# Patient Record
Sex: FEMALE | Race: WHITE | Employment: FULL TIME | URBAN - METROPOLITAN AREA
[De-identification: names, ages, dates, MRNs, and addresses within clinical notes are randomized per-mention and may not be internally consistent; named-entity substitution may affect disease eponyms.]

---

## 2017-02-14 ENCOUNTER — OFFICE VISIT (OUTPATIENT)
Dept: INTERNAL MEDICINE CLINIC | Age: 27
End: 2017-02-14

## 2017-02-14 VITALS
DIASTOLIC BLOOD PRESSURE: 68 MMHG | OXYGEN SATURATION: 98 % | RESPIRATION RATE: 16 BRPM | TEMPERATURE: 99.1 F | SYSTOLIC BLOOD PRESSURE: 96 MMHG | HEART RATE: 86 BPM | BODY MASS INDEX: 19.53 KG/M2 | HEIGHT: 65 IN | WEIGHT: 117.2 LBS

## 2017-02-14 DIAGNOSIS — F60.3 BORDERLINE PERSONALITY DISORDER (HCC): ICD-10-CM

## 2017-02-14 DIAGNOSIS — E10.9 INSULIN DEPENDENT DIABETES MELLITUS TYPE IA (HCC): Primary | ICD-10-CM

## 2017-02-14 DIAGNOSIS — D64.89 ANEMIA DUE TO OTHER CAUSE: ICD-10-CM

## 2017-02-14 DIAGNOSIS — F41.1 GENERALIZED ANXIETY DISORDER: ICD-10-CM

## 2017-02-14 DIAGNOSIS — F98.8 ADD (ATTENTION DEFICIT DISORDER): ICD-10-CM

## 2017-02-14 DIAGNOSIS — E53.8 B12 DEFICIENCY DUE TO DIET: ICD-10-CM

## 2017-02-14 RX ORDER — INSULIN GLARGINE 100 [IU]/ML
INJECTION, SOLUTION SUBCUTANEOUS
Qty: 3 EACH | Refills: 3 | Status: SHIPPED | OUTPATIENT
Start: 2017-02-14 | End: 2017-08-14 | Stop reason: SDUPTHER

## 2017-02-14 RX ORDER — PEN NEEDLE, DIABETIC 30 GX3/16"
NEEDLE, DISPOSABLE MISCELLANEOUS
Qty: 1 PACKAGE | Refills: 11 | Status: SHIPPED | OUTPATIENT
Start: 2017-02-14 | End: 2017-05-15

## 2017-02-14 RX ORDER — SERTRALINE HYDROCHLORIDE 25 MG/1
25 TABLET, FILM COATED ORAL DAILY
Qty: 60 TAB | Refills: 0 | Status: SHIPPED | OUTPATIENT
Start: 2017-02-14 | End: 2017-04-15

## 2017-02-14 RX ORDER — INSULIN ASPART 100 [IU]/ML
INJECTION, SOLUTION INTRAVENOUS; SUBCUTANEOUS
Qty: 2 ADJUSTABLE DOSE PRE-FILLED PEN SYRINGE | Refills: 2 | Status: SHIPPED | OUTPATIENT
Start: 2017-02-14 | End: 2017-04-28 | Stop reason: SDUPTHER

## 2017-02-14 NOTE — LETTER
2/28/2017 2:42 PM 
 
Ms. Ashley Courtney [address] [facility] [address] Dear Ashley Courtney: 
 
Please find your most recent results below. Resulted Orders AMB POC URINE, MICROALBUMIN, SEMIQUANT (3 RESULTS) Result Value Ref Range ALBUMIN, URINE POC 150mg Negative mg/L  
 CREATININE, URINE POC 300mg mg/dL Microalbumin/creat ratio (POC) 30-300mg mg/g RECOMMENDATIONS: 
labs came back fine Please call me if you have any questions: [phone] Sincerely, Vada Bumpers, MD

## 2017-02-14 NOTE — MR AVS SNAPSHOT
Visit Information Date & Time Provider Department Dept. Phone Encounter #  
 2/14/2017  2:00 PM Devan Brower MD [facility] Internal Medicine [phone] [id number] Upcoming Health Maintenance Date Due  
 FOOT EXAM Q1 4/11/2002 EYE EXAM RETINAL OR DILATED Q1 4/11/2002 HPV AGE 9Y-26Y (1 of 3 - Female 3 Dose Series) 4/11/2003 Pneumococcal 19-64 Medium Risk (1 of 1 - PPSV23) 4/11/2011 DTaP/Tdap/Td series (1 - Tdap) 4/11/2013 HEMOGLOBIN A1C Q6M 8/14/2017 MICROALBUMIN Q1 2/14/2018 LIPID PANEL Q1 2/14/2018 PAP AKA CERVICAL CYTOLOGY 8/11/2018 Allergies as of 2/14/2017  Review Complete On: 2/14/2017 By: Devan Brower MD  
 No Known Allergies Current Immunizations  Never Reviewed No immunizations on file. Not reviewed this visit You Were Diagnosed With   
  
 Codes Comments Insulin dependent diabetes mellitus type IA ([facility]    -  Primary ICD-10-CM: E10.9 ICD-9-CM: 250.01 Borderline personality disorder     ICD-10-CM: F60.3 ICD-9-CM: 623.63 Generalized anxiety disorder     ICD-10-CM: F41.1 ICD-9-CM: 300.02   
 ADD (attention deficit disorder)     ICD-10-CM: F98.8 ICD-9-CM: 314.00 Anemia due to other cause     ICD-10-CM: D64.89   
 B12 deficiency due to diet     ICD-10-CM: E53.8 ICD-9-CM: 266.2 Vitals BP Pulse Temp Resp Height(growth percentile) Weight(growth percentile) 96/68 (BP 1 Location: Right arm, BP Patient Position: Sitting) 86 99.1 °F (37.3 °C) (Oral) 16 5' 5\" (1.651 m) 117 lb 3.2 oz (53.2 kg) LMP SpO2 BMI OB Status Smoking Status 02/06/2017 98% 19.5 kg/m2 Having regular periods Current Every Day Smoker BMI and BSA Data Body Mass Index Body Surface Area 19.5 kg/m 2 1.56 m 2 Preferred Pharmacy Pharmacy Name Phone [address] [place] [address] [phone] Your Updated Medication List  
  
   
This list is accurate as of: 2/14/17  3:00 PM.  Always use your most recent med list.  
  
  
  
  
 aspirin 81 mg tablet Take 81 mg by mouth.  
  
 glucagon 1 mg Kit  
1 mg by Injection route as needed. glucose blood VI test strips strip Commonly known as:  FREESTYLE TEST  
100 Each by Does Not Apply route four (4) times daily. insulin glargine 100 unit/mL (3 mL) pen Commonly known as:  LANTUS SOLOSTAR  
38 units daily Insulin Needles (Disposable) 31 gauge x 5/16\" Ndle  
by SubCUTAneous route Before breakfast, lunch, and dinner for 90 days. Iron 160 mg (50 mg iron) Tber tablet Generic drug:  ferrous sulfate ER Take 1 Tab by mouth daily. Lancets Misc Commonly known as:  FREESTYLE LANCETS  
2 Packages by Does Not Apply route four (4) times daily. * insulin aspart 100 unit/mL Inpn Commonly known as:  NOVOLOG  
3 times a day before meals as per sliding scale coverage. * NovoLOG Flexpen 100 unit/mL Inpn Generic drug:  insulin aspart  
7 Units by SubCUTAneous route three (3) times daily (with meals). sertraline 25 mg tablet Commonly known as:  ZOLOFT Take 1 Tab by mouth daily for 60 days. VITAMIN D2 PO Take  by mouth. * Notice: This list has 2 medication(s) that are the same as other medications prescribed for you. Read the directions carefully, and ask your doctor or other care provider to review them with you. Prescriptions Printed Refills  
 insulin glargine (LANTUS SOLOSTAR) 100 unit/mL (3 mL) pen 3 Si units daily Class: Print  
 sertraline (ZOLOFT) 25 mg tablet 0 Sig: Take 1 Tab by mouth daily for 60 days. Class: Print Route: Oral  
 insulin aspart (NOVOLOG) 100 unit/mL inpn 2 Sig: 3 times a day before meals as per sliding scale coverage. Class: Print Insulin Needles, Disposable, 31 gauge x 5/16\" ndle 11 Sig: by SubCUTAneous route Before breakfast, lunch, and dinner for 90 days. Class: Print Route: SubCUTAneous We Performed the Following AMB POC URINE, MICROALBUMIN, SEMIQUANT (3 RESULTS) [08524 CPT(R)] REFERRAL TO OPHTHALMOLOGY [REF57 Custom] Comments:  
 Please evaluate patient for diabetic eye check. To-Do List   
 02/17/2017 Lab:  CBC W/O DIFF   
  
 02/17/2017 Lab:  HEMOGLOBIN A1C WITH EAG   
  
 02/17/2017 Lab:  LIPID PANEL   
  
 02/17/2017 Lab:  METABOLIC PANEL, COMPREHENSIVE   
  
 02/17/2017 Lab:  TSH 3RD GENERATION   
  
 02/17/2017 Lab:  VITAMIN B12 & FOLATE Referral Information Referral ID Referred By Referred To  
  
 [id number] [facility] [address] [facility] Phone: [phone] Fax: [phone] Visits Status Start Date End Date 1 New Request 2/14/17 2/14/18 If your referral has a status of pending review or denied, additional information will be sent to support the outcome of this decision. Introducing \[facility]\"" & HEALTH SERVICES! [facility] introduces Betterfly patient portal. Now you can access parts of your medical record, email your doctor's office, and request medication refills online. 1. In your internet browser, go to [email or web address] 2. Click on the First Time User? Click Here link in the Sign In box. You will see the New Member Sign Up page. 3. Enter your Betterfly Access Code exactly as it appears below. You will not need to use this code after youve completed the sign-up process. If you do not sign up before the expiration date, you must request a new code. · Betterfly Access Code: [facility] YOVANI BLAND [facility] Expires: 5/15/2017  3:00 PM 
 
4. Enter the last four digits of your Social Security Number (xxxx) and Date of Birth (mm/dd/yyyy) as indicated and click Submit. You will be taken to the next sign-up page. 5. Create a Betterfly ID. This will be your Betterfly login ID and cannot be changed, so think of one that is secure and easy to remember. 6. Create a [employer] password. You can change your password at any time. 7. Enter your Password Reset Question and Answer. This can be used at a later time if you forget your password. 8. Enter your e-mail address. You will receive e-mail notification when new information is available in [address] 9. Click Sign Up. You can now view and download portions of your medical record. 10. Click the Download Summary menu link to download a portable copy of your medical information. If you have questions, please visit the Frequently Asked Questions section of the [employer] website. Remember, [employer] is NOT to be used for urgent needs. For medical emergencies, dial 911. Now available from your iPhone and Android! Please provide this summary of care documentation to your next provider. Your primary care clinician is listed as Radha Bird. If you have any questions after today's visit, please call [phone].

## 2017-02-14 NOTE — PROGRESS NOTES
Chief Complaint   Patient presents with    New Patient     patient is type one diabetic.  states that she is needing a referral for the eye doctor. states that she has a few things to get done to fix health and life.   is going to school for IT  has an appointment with endocrinologist

## 2017-02-14 NOTE — PROGRESS NOTES
Written by Willa Yañez, as dictated by Dr. Rosey Lama MD.    Carmela Moe is a 27 y.o. female. HPI  The patient comes in today to establish care. She has lived in [place] for 6 months now. She is type I diabetic. She needs a referral for an ophthalmologist. She is following with an endocrinologist on 02/21. She was diagnosed with type I at 25. She is not on a pump, but she would like to look into that as she is not good taking insulin on time. Her A1c was 6.0% or 7.0% when her diabetes was well controlled. She let her health slip some and her A1c went up when it was checked 1 month ago. She has been trying to use alarms on her phone to check her sugars regularly and her insulin is running low. She is taking 28 units of Lantus and 7 units of Novolog TID with meals. Her BS is running in 200-300. She does not have any neuropathy. She has a lot of anxiety and she followed with a psychiatrist. When she was following with a psychiatrist she was dx'd with borderline personality disorder and anxiety disorder. She was on a mood stabilizer previously, she thinks it was Zoloft. She would like to follow with a psychiatrist again. She used to take adderall XR and she would like to restart medication or be tested if needed. She takes iron because she is a vegetarian. She has not had her iron checked recently. She denies any irregular BMs, urinary problems, or leg swelling. She is not taking her oral contraceptives at this time but she is not sexually active. She had a pap smear in 2015 and she had a flu shot in October.      Patient Active Problem List   Diagnosis Code    Type I (juvenile type) diabetes mellitus without mention of complication, uncontrolled E10.65    Borderline personality disorder F60.3    Insulin dependent diabetes mellitus type IA ([facility] E10.9    Generalized anxiety disorder F41.1        Current Outpatient Prescriptions on File Prior to Visit Medication Sig Dispense Refill    ERGOCALCIFEROL, VITAMIN D2, (VITAMIN D PO) Take  by mouth.  insulin aspart (NOVOLOG FLEXPEN) 100 unit/mL flexpen 7 Units by SubCUTAneous route three (3) times daily (with meals).  aspirin 81 mg tablet Take 81 mg by mouth.  glucagon 1 mg Kit 1 mg by Injection route as needed. 1 Package 2    Lancets (FREESTYLE LANCETS) Misc 2 Packages by Does Not Apply route four (4) times daily. 100 Each 6    glucose blood VI test strips (FREESTYLE TEST) strip 100 Each by Does Not Apply route four (4) times daily. 2 Package 6     No current facility-administered medications on file prior to visit. No Known Allergies    Past Medical History   Diagnosis Date    Diabetes mellitus        History reviewed. No pertinent past surgical history. Family History   Problem Relation Age of Onset    Cancer Paternal Grandfather        Social History     Social History    Marital status: SINGLE     Spouse name: N/A    Number of children: N/A    Years of education: N/A     Occupational History    Not on file. Social History Main Topics    Smoking status: Current Every Day Smoker     Packs/day: 0.25    Smokeless tobacco: Not on file    Alcohol use No    Drug use: No    Sexual activity: Not on file     Other Topics Concern    Not on file     Social History Narrative         Review of Systems   Constitutional: Negative for malaise/fatigue. HENT: Negative for congestion. Eyes: Negative for blurred vision and discharge. Respiratory: Negative for cough and wheezing. Cardiovascular: Negative for chest pain and palpitations. Gastrointestinal: Negative for abdominal pain and heartburn. Genitourinary: Negative for frequency and urgency. Musculoskeletal: Negative for joint pain and myalgias. Neurological: Negative for dizziness, tingling, sensory change, weakness and headaches. Psychiatric/Behavioral: Negative for depression and suicidal ideas.  The patient is not nervous/anxious. Visit Vitals    BP 96/68 (BP 1 Location: Right arm, BP Patient Position: Sitting)    Pulse 86    Temp 99.1 °F (37.3 °C) (Oral)    Resp 16    Ht 5' 5\" (1.651 m)    Wt 117 lb 3.2 oz (53.2 kg)    LMP 02/06/2017    SpO2 98%    BMI 19.5 kg/m2     Physical Exam   Constitutional: She is oriented to person, place, and time. She appears well-nourished. No distress. HENT:   Right Ear: External ear normal.   Left Ear: External ear normal.   Mouth/Throat: Oropharynx is clear and moist.   + nose piericing    Eyes: Conjunctivae and EOM are normal.   Neck: Normal range of motion. Neck supple. Cardiovascular: Normal rate and regular rhythm. Pulmonary/Chest: Effort normal and breath sounds normal. She has no wheezes. Abdominal: Soft. Bowel sounds are normal.   Lymphadenopathy:     She has no cervical adenopathy. Neurological: She is alert and oriented to person, place, and time. Skin: Skin is intact. Psychiatric: She has a normal mood and affect. Nursing note and vitals reviewed. ASSESSMENT and PLAN    ICD-10-CM ICD-9-CM    1. Insulin dependent diabetes mellitus type IA (HCC) E10.9 250.01 insulin glargine (LANTUS SOLOSTAR) 100 unit/mL (3 mL) pen script given to leeanna. insulin aspart (NOVOLOG) 100 unit/mL inpn script given to leeanna. Insulin Needles, Disposable, 31 gauge x 5/16\" ndle      REFERRAL TO OPHTHALMOLOGY      LIPID PANEL      METABOLIC PANEL, COMPREHENSIVE      TSH 3RD GENERATION      HEMOGLOBIN A1C WITH EAG      AMB POC URINE, MICROALBUMIN, SEMIQUANT (3 RESULTS)    I discussed that we can give her the information on an insulin pump. The company would come out to her house and help teach her the how it operates and adjust it. Pt will return during lab hours to have basic fasting labs drawn. 2. Borderline personality disorder F60.3 301.83 I restarted her zoloft   3.  Generalized anxiety disorder F41.1 300.02 sertraline (ZOLOFT) 25 mg tablet script given to leeanna. I will restart her on Zoloft and if she wants to follow with a psychiatrist then I can send her a referral.    4. ADD (attention deficit disorder) F98.8 314.00 i discussed I can send her for ADD testing in the future   5. Anemia due to other cause D64.89  CBC W/O DIFF   6. B12 deficiency due to diet E53.8 266.2 VITAMIN B12 & FOLATE    I will check her B12 and Iron levels since she is a vegetarian. This plan was reviewed with the patient and patient agrees. All questions were answered. This scribe documentation was reviewed by me and accurately reflects the examination and decisions made by me. This note will not be viewable in [address]

## 2017-02-22 DIAGNOSIS — E10.9 INSULIN DEPENDENT DIABETES MELLITUS TYPE IA (HCC): Primary | ICD-10-CM

## 2017-02-22 LAB
ALBUMIN UR QL STRIP: NORMAL MG/L
CREATININE, URINE POC: NORMAL MG/DL
MICROALBUMIN/CREAT RATIO POC: NORMAL MG/G

## 2017-02-23 LAB
ALBUMIN SERPL-MCNC: 4.4 G/DL (ref 3.5–5.5)
ALBUMIN/GLOB SERPL: 1.8 {RATIO} (ref 1.1–2.5)
ALP SERPL-CCNC: 80 IU/L (ref 39–117)
ALT SERPL-CCNC: 13 IU/L (ref 0–32)
AST SERPL-CCNC: 12 IU/L (ref 0–40)
BILIRUB SERPL-MCNC: 0.3 MG/DL (ref 0–1.2)
BUN SERPL-MCNC: 12 MG/DL (ref 6–20)
BUN/CREAT SERPL: 19 (ref 8–20)
CALCIUM SERPL-MCNC: 9.6 MG/DL (ref 8.7–10.2)
CHLORIDE SERPL-SCNC: 99 MMOL/L (ref 96–106)
CHOLEST SERPL-MCNC: 189 MG/DL (ref 100–199)
CO2 SERPL-SCNC: 25 MMOL/L (ref 18–29)
CREAT SERPL-MCNC: 0.64 MG/DL (ref 0.57–1)
ERYTHROCYTE [DISTWIDTH] IN BLOOD BY AUTOMATED COUNT: 12.4 % (ref 12.3–15.4)
EST. AVERAGE GLUCOSE BLD GHB EST-MCNC: 338 MG/DL
FOLATE SERPL-MCNC: >20 NG/ML
GLOBULIN SER CALC-MCNC: 2.4 G/DL (ref 1.5–4.5)
GLUCOSE SERPL-MCNC: 273 MG/DL (ref 65–99)
HBA1C MFR BLD: 13.4 % (ref 4.8–5.6)
HCT VFR BLD AUTO: 47.1 % (ref 34–46.6)
HDLC SERPL-MCNC: 85 MG/DL
HGB BLD-MCNC: 15.9 G/DL (ref 11.1–15.9)
INTERPRETATION, 910389: NORMAL
IRON SATN MFR SERPL: 45 % (ref 15–55)
IRON SERPL-MCNC: 138 UG/DL (ref 27–159)
LDLC SERPL CALC-MCNC: 95 MG/DL (ref 0–99)
MCH RBC QN AUTO: 32 PG (ref 26.6–33)
MCHC RBC AUTO-ENTMCNC: 33.8 G/DL (ref 31.5–35.7)
MCV RBC AUTO: 95 FL (ref 79–97)
PLATELET # BLD AUTO: 324 X10E3/UL (ref 150–379)
POTASSIUM SERPL-SCNC: 4.3 MMOL/L (ref 3.5–5.2)
PROT SERPL-MCNC: 6.8 G/DL (ref 6–8.5)
RBC # BLD AUTO: 4.97 X10E6/UL (ref 3.77–5.28)
SODIUM SERPL-SCNC: 142 MMOL/L (ref 134–144)
TIBC SERPL-MCNC: 308 UG/DL (ref 250–450)
TRIGL SERPL-MCNC: 46 MG/DL (ref 0–149)
UIBC SERPL-MCNC: 170 UG/DL (ref 131–425)
VIT B12 SERPL-MCNC: 627 PG/ML (ref 211–946)
VLDLC SERPL CALC-MCNC: 9 MG/DL (ref 5–40)
WBC # BLD AUTO: 5.6 X10E3/UL (ref 3.4–10.8)

## 2017-02-24 NOTE — PROGRESS NOTES
Spoke to patient after making 2 identifications, paper work has been faxed for her to be able to get the pump. Patient is aware at this time.

## 2017-02-24 NOTE — PROGRESS NOTES
Pls ask her if she has made an appointment with Endo or willing to get insulin pump.  Her HbA1C is very abnormal.

## 2017-02-26 ENCOUNTER — DOCUMENTATION ONLY (OUTPATIENT)
Dept: OPHTHALMOLOGY | Age: 27
End: 2017-02-26

## 2017-02-26 NOTE — PROGRESS NOTES
Dear Dr. Ricki Ordaz,   Thank you for your kind referral of Ms. Alejandrina Coombs for her diabetic eye exam.  Her exam was negative for any diabetic retinopathy or macular edema in either eye. Please find my complete notes below for your review. Sincerely,  Olivia Joyner MD    Outpatient Ophthalmology Note    Date of Service: Feb 23, 2017    Facility: [facility] SYBIL, [facility], [facility], [address] [place] [address], [address]  Phone: [phone]; Fax: [phone]    Ophthalmologist: Olivia Joyner MD    SUBJECTIVE    CC: Tere Valente exam\"    HPI: Anshul Aguilar is 27 yrs old  female who presents for diabetic exam as referred by Dr Ricki Ordaz. Patient states her glasses were destroyed by her dog 5 months ago, but, she was noting a distance decrease in vision even prior to dog eating specs. Patient has a history of not complying with treatment for diabetes in past, but is trying now to take better care of herself. Patient is hoping to get an insulin pump to treat IDDM better.     Last exam: 1 year ago    BY: [address] clinic  LBS: 180 earlier this week,  patient does not check everyday    Past Medical Hx:   IDDM - 2012  Depression  Borderline personality disorder    Past Surgical Hx:   None    Medications:   Novalog  Lantus  Iron  Vitamin D  81 mg aspirin    Allergies:   None    Social Hx:   Smoker - 1 pack per day  ETOH 2-4 x week  Marijuana use  Single    Family Hx:   Unknown    OBJECTIVE    Visual Acuity:  Distance (un corrected)  OD 20/40-1, pinhole 20/30  OS 20/30-2, pinhole 20/NI     AR:  OD: +0.25 +0.50 x 028  OS: [place] + 0.25 x 140    Manifest Refraction: DVO Rx  OD -0.50    20/20  OS  -0.50   20/20          Confrontation Visual Fields  OD: Full to count fingers   OS: Full to count fingers     Motility:   OD - full in all fields of gaze  OS - full in all fields of gaze    Pupils:   OD: 5.0 mm in dark to 4.0 mm; No  rAPD  OS: 5.0 mm in dark to 4.0 mm;  No  rAPD    IOP (Ta): OD  20  mmHg,                   OS 20  mmHg  at 11: 04  a.m. Gonioscopy:  OD:   OS:       Slit Lamp Exam:  External: Normal. No ptosis, no rash, no facial droop  Lids/lashes: Clear OU  Conjunctiva: Clear OU, inflammation  Cornea: Clear OU  Anterior Segment: Clear OU  Iris: normal OU  LENS. Clear OU  Anterior Vitreous: Clear OU. Dilated Fundus Exam:   Dilating Drops (M) instilled at 11:06  a.m. Vitreous:   OD: clear  OS: clear    Optic nerve:   OD - c/d 0.2, healthy, pink, no disc edema  OS - c/d 0.2, healthy, pink, no disc edema    Macula:  OD - normal fovea, no drusen, no macular edema, no hemorrhages  OS  - normal fovea, no drusen, no macular edema, no hemorrhages     Vessels:   OD: normal A/V ratio  OS: normal A/V ratio    Periphery:    OD: No retinal detachment/holes/tears  OS: No retinal detachment/holes/tears    ASSESSMENT    1. Diabetes mellitus (dx age 27) - IDDM  - no diabetic retinopathy  - no macular edema    2. Mild refractive error    Diagnoses attached to this encounter:  (E10.9) Type 1 diabetes mellitus without complications  (K74.08) Myopia, bilateral    PLAN    Diabetes mellitus  - discussed eye disease with uncontrolled diabetes including swelling of the retina (macular edema), bleeding and cholesterol deposits (mild, moderate or severe) and new blood vessel growth in the retina (proliferative changes) as well as risk of cataracts and glaucoma. - blood sugar control and monitoring. Follow up in 1 year to recheck with complete eye exam    Dispense MRx for glasses as requested.      Letter to Dr. Dea Gonzalez MD

## 2017-04-28 ENCOUNTER — OFFICE VISIT (OUTPATIENT)
Dept: INTERNAL MEDICINE CLINIC | Age: 27
End: 2017-04-28

## 2017-04-28 VITALS
SYSTOLIC BLOOD PRESSURE: 120 MMHG | DIASTOLIC BLOOD PRESSURE: 78 MMHG | RESPIRATION RATE: 20 BRPM | WEIGHT: 115 LBS | HEART RATE: 89 BPM | OXYGEN SATURATION: 99 % | TEMPERATURE: 98.3 F | BODY MASS INDEX: 19.16 KG/M2 | HEIGHT: 65 IN

## 2017-04-28 DIAGNOSIS — L28.2 PRURITIC RASH: ICD-10-CM

## 2017-04-28 DIAGNOSIS — L08.9 INFECTED PIERCED EAR, LEFT, INITIAL ENCOUNTER: Primary | ICD-10-CM

## 2017-04-28 DIAGNOSIS — L08.9 BACTERIAL SKIN INFECTION: ICD-10-CM

## 2017-04-28 DIAGNOSIS — B96.89 BACTERIAL SKIN INFECTION: ICD-10-CM

## 2017-04-28 DIAGNOSIS — S01.332A INFECTED PIERCED EAR, LEFT, INITIAL ENCOUNTER: Primary | ICD-10-CM

## 2017-04-28 RX ORDER — MUPIROCIN 20 MG/G
OINTMENT TOPICAL DAILY
Qty: 22 G | Refills: 0 | Status: SHIPPED | OUTPATIENT
Start: 2017-04-28 | End: 2018-02-28

## 2017-04-28 RX ORDER — SULFAMETHOXAZOLE AND TRIMETHOPRIM 800; 160 MG/1; MG/1
1 TABLET ORAL 2 TIMES DAILY
Qty: 20 TAB | Refills: 0 | Status: SHIPPED | OUTPATIENT
Start: 2017-04-28 | End: 2017-06-06 | Stop reason: ALTCHOICE

## 2017-04-28 NOTE — PROGRESS NOTES
HISTORY OF PRESENT ILLNESS  Lashae Parsons is a 24 y.o. female here with skin concern. Patient Active Problem List   Diagnosis Code    Type I (juvenile type) diabetes mellitus without mention of complication, uncontrolled E10.65    Borderline personality disorder F60.3    Insulin dependent diabetes mellitus type IA (HCC) E10.9    Generalized anxiety disorder F41.1     No Known Allergies  Current Outpatient Prescriptions on File Prior to Visit   Medication Sig Dispense Refill    ferrous sulfate ER (IRON) 160 mg (50 mg iron) TbER tablet Take 1 Tab by mouth daily.  insulin glargine (LANTUS SOLOSTAR) 100 unit/mL (3 mL) pen 38 units daily 3 Each 3    Insulin Needles, Disposable, 31 gauge x 5/16\" ndle by SubCUTAneous route Before breakfast, lunch, and dinner for 90 days. 1 Package 11    ERGOCALCIFEROL, VITAMIN D2, (VITAMIN D PO) Take  by mouth.  insulin aspart (NOVOLOG FLEXPEN) 100 unit/mL flexpen 7 Units by SubCUTAneous route three (3) times daily (with meals).  aspirin 81 mg tablet Take 81 mg by mouth.  glucagon 1 mg Kit 1 mg by Injection route as needed. 1 Package 2    Lancets (FREESTYLE LANCETS) Misc 2 Packages by Does Not Apply route four (4) times daily. 100 Each 6    glucose blood VI test strips (FREESTYLE TEST) strip 100 Each by Does Not Apply route four (4) times daily. 2 Package 6     No current facility-administered medications on file prior to visit. Past Medical History:   Diagnosis Date    Diabetes mellitus      History reviewed. No pertinent surgical history. Social History     Social History    Marital status: SINGLE     Spouse name: N/A    Number of children: N/A    Years of education: N/A     Occupational History    Not on file.      Social History Main Topics    Smoking status: Current Every Day Smoker     Packs/day: 0.25    Smokeless tobacco: Not on file    Alcohol use No    Drug use: No    Sexual activity: Not on file     Other Topics Concern    Not on file     Social History Narrative     Family History   Problem Relation Age of Onset    Cancer Paternal Grandfather      This note will not be viewable in [address] If Narcotics or controlled substances were prescribed, I personally reviewed the patient  history. HPI   Patient with history of diabetes presents with concerns of a skin infection following a tattoo 2 weeks ago. She noted that the person who did the tattoo told her when she called that there had been \"some people who got a staph infection\". She is concerned. She has noted erythema and excoriation to the area of the tattoo but no drainage, abscess, red-streaking or tenderness. She has some scabs forming on the areas she admits to having \"picked at\" near the tattoo. Patient also states that she has irritation and erythema surrounding the piercing of her left upper ear cartilage. She has mild erythema noted to the piercing but no drainage, tenderness or bleeding. There are no additional concerns or symptoms at this time. Review of Systems   Constitutional: Negative for chills and fever. HENT: Negative for congestion, ear pain and sore throat. Respiratory: Negative for cough and shortness of breath. Cardiovascular: Negative for chest pain and palpitations. Gastrointestinal: Negative for abdominal pain, constipation, diarrhea, nausea and vomiting. Genitourinary: Negative for dysuria, frequency and urgency. Musculoskeletal: Negative for back pain, myalgias and neck pain. Skin: Positive for rash. Negative for itching. Neurological: Negative for dizziness and headaches. Physical Exam   Constitutional: She is oriented to person, place, and time. Vital signs are normal. She appears well-developed and well-nourished. She is cooperative. She does not appear ill. No distress. HENT:   Head:       Right Ear: Hearing and ear canal normal. No drainage, swelling or tenderness. Tympanic membrane is not erythematous and not bulging. No middle ear effusion. Left Ear: Hearing, tympanic membrane and ear canal normal. No drainage, swelling or tenderness. Tympanic membrane is not erythematous and not bulging. No middle ear effusion. Ears:    Nose: No rhinorrhea. Right sinus exhibits no maxillary sinus tenderness and no frontal sinus tenderness. Left sinus exhibits no maxillary sinus tenderness and no frontal sinus tenderness. Mouth/Throat: Oropharynx is clear and moist and mucous membranes are normal.   Neck: Normal range of motion. Neck supple. Cardiovascular: Normal rate, regular rhythm and normal heart sounds. Pulmonary/Chest: Effort normal and breath sounds normal. No respiratory distress. Musculoskeletal: She exhibits no edema. Lymphadenopathy:     She has no cervical adenopathy. Neurological: She is alert and oriented to person, place, and time. Skin: Skin is warm, dry and intact. No bruising noted. She is not diaphoretic. There is erythema. No pallor. Psychiatric: She has a normal mood and affect. Her behavior is normal.   Nursing note and vitals reviewed. ASSESSMENT and PLAN    ICD-10-CM ICD-9-CM    1. Infected pierced ear, left, initial encounter S01.332A 958.3    2. Bacterial skin infection L08.9 686.9 trimethoprim-sulfamethoxazole (BACTRIM DS, SEPTRA DS) 160-800 mg per tablet    B96.89 041.9 mupirocin (BACTROBAN) 2 % ointment   3. Pruritic rash L28.2 698.2    Bactrim and bactroban prescribed. Medication benefits, risks, indication, dosage, potential side effects and alternate medication options were discussed with patient who expressed understanding. Encouraged her to keep the area clean, covered and dry. Monitor for worsening signs of infection (fever, red-streaking, drainage, etc). Call with any questions and follow up if no improvement in 7-10 days. Patient expressed understanding of instructions and agreement with treatment plan.

## 2017-04-28 NOTE — PATIENT INSTRUCTIONS
Thank you for choosing [facility]. We know you have many options when it comes to your healthcare; we appreciate that you picked us. Our goal is to provide exceptional  service and world class care for every patient. You may receive a survey in the mail or by email asking for your feedback. Please take a few minutes to share your thoughts about your recent visit. Your comments helps us understand what we do well and what we can do better. To ensure confidentiality, this survey is administered by an independent third-party, [address] [place] [address] participation will help us to improve the quality of care that we provide to you, your family, friends, and neighbors. Thank you! Infection From Body Piercings: Care Instructions  Your Care Instructions  An infected piercing can be serious. The area around your piercing may be painful, swollen, red, and hot. You may see red streaks or pus at the piercing site. You may have a fever. Or you may have swollen or tender lymph nodes. It's important to take good care of your infection at home so it doesn't get worse. Follow-up care is a key part of your treatment and safety. Be sure to make and go to all appointments, and call your doctor if you are having problems. It's also a good idea to know your test results and keep a list of the medicines you take. How can you care for yourself at home? · If your doctor prescribed antibiotics, take them as directed. Do not stop taking them just because you feel better. You need to take the full course of antibiotics. · Remove the jewelry unless your doctor says it's okay to keep it in. Soak the area in warm water for 20 minutes, 3 or 4 times a day. If it's too hard to soak the site (for example, if you had your belly button pierced), apply a warm, moist cloth instead. · If your doctor told you how to care for your infected piercing, follow your doctor's instructions.  If you did not get instructions, follow this general advice:  ¨ Wash the area with clean water 2 times a day. Don't use hydrogen peroxide or alcohol, which can slow healing. ¨ You may cover the area with a thin layer of petroleum jelly, such as Vaseline, and a nonstick bandage. ¨ Apply more petroleum jelly and replace the bandage as needed. · Ask your doctor if you can take an over-the-counter pain medicine, such as acetaminophen (Tylenol), ibuprofen (Advil, Motrin), or naproxen (Aleve). Be safe with medicines. Read and follow all instructions on the label. When should you call for help? Call your doctor now or seek immediate medical care if:  · You lose feeling in the area near the piercing, or it feels numb or tingly. · The skin near the piercing turns pale or cool. · The pierced area starts to bleed, and blood soaks through the bandage. Oozing small amounts of blood is normal.  Watch closely for changes in your health, and be sure to contact your doctor if:  · Your symptoms are getting worse. Where can you learn more? Go to http://sandroExeger [place] ABapolinar.info/. Enter U046 in the search box to learn more about \"Infection From Body Piercings: Care Instructions. \"  Current as of: May 27, 2016  Content Version: 11.2  © 0782-2771 [employer]. Care instructions adapted under license by [employer] (which disclaims liability or warranty for this information). If you have questions about a medical condition or this instruction, always ask your healthcare professional. [place] [address] any warranty or liability for your use of this information. Infection From Tattoos: Care Instructions  Your Care Instructions  An infected tattoo can be serious. The area around your tattoo may be painful, swollen, red, and hot. You may see red streaks or pus at the tattoo site. You may have a fever. Or you may have swollen or tender lymph nodes.   It's important to take good care of your infection at home so it doesn't get worse. Follow-up care is a key part of your treatment and safety. Be sure to make and go to all appointments, and call your doctor if you are having problems. It's also a good idea to know your test results and keep a list of the medicines you take. How can you care for yourself at home? · If your doctor prescribed antibiotics, take them as directed. Do not stop taking them just because you feel better. You need to take the full course of antibiotics. · If your doctor told you how to care for your infected tattoo, follow your doctor's instructions. If you did not get instructions, follow this general advice:  ¨ Wash the tattoo with clean water 2 times a day. Don't use hydrogen peroxide or alcohol, which can slow healing. ¨ You may cover the tattoo with a thin layer of petroleum jelly, such as Vaseline, and a nonstick bandage. ¨ Apply more petroleum jelly and replace the bandage as needed. · Ask your doctor if you can take an over-the-counter pain medicine, such as acetaminophen (Tylenol), ibuprofen (Advil, Motrin), or naproxen (Aleve). Be safe with medicines. Read and follow all instructions on the label. When should you call for help? Call your doctor now or seek immediate medical care if:  · You lose feeling in the area near the tattoo, or it feels numb or tingly. · The skin near the tattoo turns pale or cool. · The tattoo starts to bleed, and blood soaks through the bandage. Oozing small amounts of blood is normal.  Watch closely for changes in your health, and be sure to contact your doctor if:  · Your symptoms are getting worse. Where can you learn more? Go to http://sandro-apolinar.info/. Enter C804 in the search box to learn more about \"Infection From Tattoos: Care Instructions. \"  Current as of: May 27, 2016  Content Version: 11.2  © 5052-4382 [employer].  Care instructions adapted under license by [employer] (which disclaims liability or warranty for this information). If you have questions about a medical condition or this instruction, always ask your healthcare professional. [address] any warranty or liability for your use of this information. Wound Check: Care Instructions  Your Care Instructions  People have wounds that need care for many reasons. You may have a cut that needs care after surgery. You may have a cut or puncture wound from an accident. Or you may have a wound because of a condition like diabetes. Whatever the cause of your wound, there are things you can do to care for it at home. Your doctor may also want you to come back for a wound check. The wound check lets the doctor know how your wound is healing and if you need more treatment. Follow-up care is a key part of your treatment and safety. Be sure to make and go to all appointments, and call your doctor if you are having problems. It's also a good idea to know your test results and keep a list of the medicines you take. How can you care for yourself at home? · If your doctor told you how to care for your wound, follow your doctor's instructions. If you did not get instructions, follow this general advice:  ¨ You may cover the wound with a thin layer of petroleum jelly, such as Vaseline, and a nonstick bandage. ¨ Apply more petroleum jelly and replace the bandage as needed. · Keep the wound dry for the first 24 to 48 hours. After this, you can shower if your doctor okays it. Pat the wound dry. · Be safe with medicines. Read and follow all instructions on the label. ¨ If the doctor gave you a prescription medicine for pain, take it as prescribed. ¨ If you are not taking a prescription pain medicine, ask your doctor if you can take an over-the-counter medicine. · If your doctor prescribed antibiotics, take them as directed. Do not stop taking them just because you feel better.  You need to take the full course of antibiotics. · If you have stitches, do not remove them on your own. Your doctor will tell you when to come back to have them removed. · If you have Steri-Strips, leave them on until they fall off. · If possible, prop up the injured area on a pillow anytime you sit or lie down during the next 3 days. Try to keep it above the level of your heart. This will help reduce swelling. When should you call for help? Call your doctor now or seek immediate medical care if:  · You have new pain, or the pain gets worse. · The skin near the wound is cold or pale or changes color. · You have tingling, weakness, or numbness near the wound. · The wound starts to bleed, and blood soaks through the bandage. Oozing small amounts of blood is normal.  · You have symptoms of infection, such as:  ¨ Increased pain, swelling, warmth, or redness. ¨ Red streaks leading from the wound. ¨ Pus draining from the wound. ¨ A fever. Watch closely for changes in your health, and be sure to contact your doctor if:  · You do not get better as expected. Where can you learn more? Go to http://sandro-apolinar.info/. Enter P342 in the search box to learn more about \"Wound Check: Care Instructions. \"  Current as of: May 27, 2016  Content Version: 11.2  © 9860-7650 [employer]. Care instructions adapted under license by [employer] (which disclaims liability or warranty for this information). If you have questions about a medical condition or this instruction, always ask your healthcare professional. [place] [address] any warranty or liability for your use of this information.

## 2017-04-28 NOTE — PROGRESS NOTES
Chief Complaint   Patient presents with    Rash     history of diabetes, got tattoo recently(4/18/17) and then rash started which runs down her arm. 1. Have you been to the ER, urgent care clinic since your last visit? Hospitalized since your last visit? No    2. Have you seen or consulted any other health care providers outside of the [address] [place] [address] since your last visit? Include any pap smears or colon screening.  No

## 2017-06-06 ENCOUNTER — OFFICE VISIT (OUTPATIENT)
Dept: INTERNAL MEDICINE CLINIC | Age: 27
End: 2017-06-06

## 2017-06-06 VITALS
OXYGEN SATURATION: 98 % | DIASTOLIC BLOOD PRESSURE: 68 MMHG | BODY MASS INDEX: 19.16 KG/M2 | WEIGHT: 115 LBS | HEIGHT: 65 IN | HEART RATE: 80 BPM | SYSTOLIC BLOOD PRESSURE: 104 MMHG | RESPIRATION RATE: 14 BRPM | TEMPERATURE: 98 F

## 2017-06-06 DIAGNOSIS — B37.31 VAGINAL CANDIDIASIS: Primary | ICD-10-CM

## 2017-06-06 DIAGNOSIS — E10.9 INSULIN DEPENDENT DIABETES MELLITUS TYPE IA (HCC): ICD-10-CM

## 2017-06-06 RX ORDER — GLUCOSAMINE/CHONDR SU A SOD 750-600 MG
TABLET ORAL
COMMUNITY
End: 2018-02-28

## 2017-06-06 RX ORDER — FLUCONAZOLE 150 MG/1
150 TABLET ORAL DAILY
Qty: 3 TAB | Refills: 0 | Status: SHIPPED | OUTPATIENT
Start: 2017-06-06 | End: 2017-06-09

## 2017-06-06 NOTE — PROGRESS NOTES
Written by Meron Lion, as dictated by Dr. Pascual Hughes MD.    Adrian Sanchez is a 24 y.o. female. HPI  The patient comes in today for a yeast infection. She is having cottage cheese like discharge with an odor. She has had a yeast infection in the past and has a hx of diabetes. She has tried generic Monistat, which did not help. She has had sxs for about a month. She was on antibiotics last month for a skin infection, which she thinks triggered the yeast infection. She would also like a referral for an endocrinologist. She has type 1 diabetes and would like an insulin pump. She followed with a previous endocrinologist for the pump but it was too expensive. Patient Active Problem List   Diagnosis Code    Type I (juvenile type) diabetes mellitus without mention of complication, uncontrolled E10.65    Borderline personality disorder F60.3    Insulin dependent diabetes mellitus type IA ([facility].) E10.9    Generalized anxiety disorder F41.1        Current Outpatient Prescriptions on File Prior to Visit   Medication Sig Dispense Refill    ferrous sulfate ER (IRON) 160 mg (50 mg iron) TbER tablet Take 1 Tab by mouth daily.  insulin glargine (LANTUS SOLOSTAR) 100 unit/mL (3 mL) pen 38 units daily 3 Each 3    ERGOCALCIFEROL, VITAMIN D2, (VITAMIN D PO) Take  by mouth.  insulin aspart (NOVOLOG FLEXPEN) 100 unit/mL flexpen 7 Units by SubCUTAneous route three (3) times daily (with meals).  aspirin 81 mg tablet Take 81 mg by mouth.  Lancets (FREESTYLE LANCETS) Misc 2 Packages by Does Not Apply route four (4) times daily. 100 Each 6    glucose blood VI test strips (FREESTYLE TEST) strip 100 Each by Does Not Apply route four (4) times daily. 2 Package 6    mupirocin (BACTROBAN) 2 % ointment Apply  to affected area daily. 22 g 0    glucagon 1 mg Kit 1 mg by Injection route as needed.  1 Package 2     No current facility-administered medications on file prior to visit. No Known Allergies    Past Medical History:   Diagnosis Date    Diabetes mellitus        History reviewed. No pertinent surgical history. Family History   Problem Relation Age of Onset    Cancer Paternal Grandfather        Social History     Social History    Marital status: SINGLE     Spouse name: N/A    Number of children: N/A    Years of education: N/A     Occupational History    Not on file. Social History Main Topics    Smoking status: Current Every Day Smoker     Packs/day: 0.25    Smokeless tobacco: Not on file    Alcohol use No    Drug use: No    Sexual activity: Not on file     Other Topics Concern    Not on file     Social History Narrative           Review of Systems   Constitutional: Negative for malaise/fatigue. HENT: Negative for congestion. Respiratory: Negative for cough and wheezing. Cardiovascular: Negative for chest pain and palpitations. Musculoskeletal: Negative for joint pain and myalgias. Neurological: Negative for weakness and headaches. Visit Vitals    /68 (BP 1 Location: Right arm, BP Patient Position: Sitting)    Pulse 80    Temp 98 °F (36.7 °C) (Oral)    Resp 14    Ht 5' 5\" (1.651 m)    Wt 115 lb (52.2 kg)    LMP 05/07/2017 (Approximate)    SpO2 98%    BMI 19.14 kg/m2       Physical Exam   Constitutional: She is oriented to person, place, and time. She appears well-nourished. No distress. HENT:   Right Ear: External ear normal.   Left Ear: External ear normal.   Mouth/Throat: Oropharynx is clear and moist.   Eyes: Conjunctivae and EOM are normal.   Neck: Normal range of motion. Neck supple. Cardiovascular: Normal rate and regular rhythm. Pulmonary/Chest: Effort normal and breath sounds normal. She has no wheezes. Abdominal: Soft. Bowel sounds are normal.   Genitourinary: Vaginal discharge found. Neurological: She is alert and oriented to person, place, and time. Skin: Skin is intact.    Nursing note and vitals reviewed. ASSESSMENT and PLAN    ICD-10-CM ICD-9-CM    1. Vaginal candidiasis B37.3 112.1 fluconazole (DIFLUCAN) 150 mg tablet sent to pharmacy. I will give her three Diflucan for her yeast infection. If the first pill does not get rid of it completely, then she should take all 3 pills. 2. Insulin dependent diabetes mellitus type [facility] ([facility] E10.9 250.01 REFERRAL TO ENDOCRINOLOGY    I will refer her to an endocrinologist.        This plan was reviewed with the patient and patient agrees. All questions were answered. This scribe documentation was reviewed by me and accurately reflects the examination and decisions made by me. This note will not be viewable in [address]

## 2017-06-06 NOTE — MR AVS SNAPSHOT
Visit Information Date & Time Provider Department Dept. Phone Encounter #  
 6/6/2017 12:15 PM Becky Apple MD [facility] Internal Medicine [phone] [id number] Upcoming Health Maintenance Date Due  
 FOOT EXAM Q1 4/11/2002 HPV AGE 9Y-26Y (1 of 3 - Female 3 Dose Series) 4/11/2003 Pneumococcal 19-64 Medium Risk (1 of 1 - PPSV23) 4/11/2011 DTaP/Tdap/Td series (1 - Tdap) 4/11/2013 INFLUENZA AGE 9 TO ADULT 8/1/2017 HEMOGLOBIN A1C Q6M 8/22/2017 MICROALBUMIN Q1 2/22/2018 LIPID PANEL Q1 2/22/2018 EYE EXAM RETINAL OR DILATED Q1 2/26/2018 PAP AKA CERVICAL CYTOLOGY 8/11/2018 Allergies as of 6/6/2017  Review Complete On: 6/6/2017 By: Becky Apple MD  
 No Known Allergies Current Immunizations  Never Reviewed No immunizations on file. Not reviewed this visit You Were Diagnosed With   
  
 Codes Comments Vaginal candidiasis    -  Primary ICD-10-CM: B37.3 ICD-9-CM: 112.1 Insulin dependent diabetes mellitus type [facility] ([facility]     ICD-10-CM: E10.9 ICD-9-CM: 250.01 Vitals BP Pulse Temp Resp Height(growth percentile) Weight(growth percentile) 104/68 (BP 1 Location: Right arm, BP Patient Position: Sitting) 80 98 °F (36.7 °C) (Oral) 14 5' 5\" (1.651 m) 115 lb (52.2 kg) LMP SpO2 BMI OB Status Smoking Status 05/07/2017 (Approximate) 98% 19.14 kg/m2 Having regular periods Current Every Day Smoker Vitals History BMI and BSA Data Body Mass Index Body Surface Area  
 19.14 kg/m 2 1.55 m 2 Preferred Pharmacy Pharmacy Name Phone [facility] [address] [facility], [address] [facility] Floor [phone] Your Updated Medication List  
  
   
This list is accurate as of: 6/6/17  1:04 PM.  Always use your most recent med list.  
  
  
  
  
 aspirin 81 mg tablet Take 81 mg by mouth. Biotin 2,500 mcg Cap Take  by mouth. fluconazole 150 mg tablet Commonly known as:  DIFLUCAN  
 Take 1 Tab by mouth daily for 3 doses. FDA advises cautious prescribing of oral fluconazole in pregnancy. glucagon 1 mg Kit  
1 mg by Injection route as needed. glucose blood VI test strips strip Commonly known as:  FREESTYLE TEST  
100 Each by Does Not Apply route four (4) times daily. insulin glargine 100 unit/mL (3 mL) Inpn Commonly known as:  LANTUS,BASAGLAR  
38 units daily Iron 160 mg (50 mg iron) Tber tablet Generic drug:  ferrous sulfate ER Take 1 Tab by mouth daily. Lancets Misc Commonly known as:  FREESTYLE LANCETS  
2 Packages by Does Not Apply route four (4) times daily. mupirocin 2 % ointment Commonly known as:  [facility] Apply  to affected area daily. NovoLOG Flexpen 100 unit/mL Inpn Generic drug:  insulin aspart  
7 Units by SubCUTAneous route three (3) times daily (with meals). VITAMIN D2 PO Take  by mouth. Prescriptions Sent to Pharmacy Refills  
 fluconazole (DIFLUCAN) 150 mg tablet 0 Sig: Take 1 Tab by mouth daily for 3 doses. FDA advises cautious prescribing of oral fluconazole in pregnancy. Class: Normal  
 Pharmacy: [address] [address] [facility], [address] [place] [address],[address]  #: [phone] Route: Oral  
  
We Performed the Following REFERRAL TO ENDOCRINOLOGY [FVS89 Custom] Referral Information Referral ID Referred By Referred To [id number] [place], [address] Phone: [phone] Fax: [phone] Visits Status Start Date End Date 1 New Request 6/6/17 6/6/18 If your referral has a status of pending review or denied, additional information will be sent to support the outcome of this decision. Introducing [facility]!    
 Drake Dennis introduces [employer] patient portal. Now you can access parts of your medical record, email your doctor's office, and request medication refills online. 1. In your internet browser, go to [email or web address] 2. Click on the First Time User? Click Here link in the Sign In box. You will see the New Member Sign Up page. 3. Enter your [employer] Access Code exactly as it appears below. You will not need to use this code after youve completed the sign-up process. If you do not sign up before the expiration date, you must request a new code. · [employer] Access Code: [email or web address] Expires: 9/4/2017  1:04 PM 
 
4. Enter the last four digits of your Social Security Number (xxxx) and Date of Birth (mm/dd/yyyy) as indicated and click Submit. You will be taken to the next sign-up page. 5. Create a GeniusMatcher ID. This will be your GeniusMatcher login ID and cannot be changed, so think of one that is secure and easy to remember. 6. Create a [employer]Matcher password. You can change your password at any time. 7. Enter your Password Reset Question and Answer. This can be used at a later time if you forget your password. 8. Enter your e-mail address. You will receive e-mail notification when new information is available in [address] 9. Click Sign Up. You can now view and download portions of your medical record. 10. Click the Download Summary menu link to download a portable copy of your medical information. If you have questions, please visit the Frequently Asked Questions section of the Genius[employer] website. Remember, [employer]Matcher is NOT to be used for urgent needs. For medical emergencies, dial 911. Now available from your iPhone and Android! Please provide this summary of care documentation to your next provider. Your primary care clinician is listed as Zeny Ortega. If you have any questions after today's visit, please call [phone].

## 2017-07-26 ENCOUNTER — OFFICE VISIT (OUTPATIENT)
Dept: INTERNAL MEDICINE CLINIC | Age: 27
End: 2017-07-26

## 2017-07-26 VITALS
HEIGHT: 65 IN | WEIGHT: 129 LBS | HEART RATE: 87 BPM | DIASTOLIC BLOOD PRESSURE: 60 MMHG | BODY MASS INDEX: 21.49 KG/M2 | SYSTOLIC BLOOD PRESSURE: 110 MMHG | TEMPERATURE: 97.5 F | RESPIRATION RATE: 20 BRPM

## 2017-07-26 DIAGNOSIS — E10.9 INSULIN DEPENDENT DIABETES MELLITUS TYPE IA (HCC): ICD-10-CM

## 2017-07-26 DIAGNOSIS — R20.0 NUMBNESS AND TINGLING IN RIGHT HAND: ICD-10-CM

## 2017-07-26 DIAGNOSIS — M25.531 RIGHT WRIST PAIN: Primary | ICD-10-CM

## 2017-07-26 DIAGNOSIS — R20.2 NUMBNESS AND TINGLING IN RIGHT HAND: ICD-10-CM

## 2017-07-26 RX ORDER — PREDNISONE 10 MG/1
TABLET ORAL
Qty: 21 TAB | Refills: 0 | Status: SHIPPED | OUTPATIENT
Start: 2017-07-26 | End: 2017-12-06 | Stop reason: ALTCHOICE

## 2017-07-26 NOTE — PROGRESS NOTES
HISTORY OF PRESENT ILLNESS  Jovany Jordan is a 24 y.o. female here with wrist/hand pain. Patient Active Problem List   Diagnosis Code    Type I (juvenile type) diabetes mellitus without mention of complication, uncontrolled E10.65    Borderline personality disorder F60.3    Insulin dependent diabetes mellitus type IA ([facility] [address] E10.9    Generalized anxiety disorder F41.1     No Known Allergies  Current Outpatient Prescriptions on File Prior to Visit   Medication Sig Dispense Refill    Biotin 2,500 mcg cap Take  by mouth.  mupirocin (BACTROBAN) 2 % ointment Apply  to affected area daily. 22 g 0    ferrous sulfate ER (IRON) 160 mg (50 mg iron) TbER tablet Take 1 Tab by mouth daily.  insulin glargine (LANTUS SOLOSTAR) 100 unit/mL (3 mL) pen 38 units daily 3 Each 3    ERGOCALCIFEROL, VITAMIN D2, (VITAMIN D PO) Take  by mouth.  insulin aspart (NOVOLOG FLEXPEN) 100 unit/mL flexpen 7 Units by SubCUTAneous route three (3) times daily (with meals).  aspirin 81 mg tablet Take 81 mg by mouth.  glucagon 1 mg Kit 1 mg by Injection route as needed. 1 Package 2    Lancets (FREESTYLE LANCETS) Misc 2 Packages by Does Not Apply route four (4) times daily. 100 Each 6    glucose blood VI test strips (FREESTYLE TEST) strip 100 Each by Does Not Apply route four (4) times daily. 2 Package 6     No current facility-administered medications on file prior to visit. Past Medical History:   Diagnosis Date    Diabetes mellitus      History reviewed. No pertinent surgical history. Social History     Social History    Marital status: SINGLE     Spouse name: N/A    Number of children: N/A    Years of education: N/A     Occupational History    Not on file.      Social History Main Topics    Smoking status: Current Every Day Smoker     Packs/day: 0.25    Smokeless tobacco: Never Used    Alcohol use No    Drug use: No    Sexual activity: Yes     Partners: Male     Other Topics Concern    Not on file Social History Narrative     Family History   Problem Relation Age of Onset    Cancer Paternal Grandfather      This note will not be viewable in [address] If Narcotics or controlled substances were prescribed, I personally reviewed the patient  history. HPI   Patient with history of diabetes presents with right wrist/hand pain and numbness/tingling in the 4/5 digits of the right hand for several days. She reports that she feels a \"pop\" when she performs ROM of her right wrist. 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 She reports that she is having some numbness/tingling in the 4th and 5th digits. Examination reveals no abnormality on examination and she has full ROM, +sensation, strong pulses and brisk capillary refill in the extremity. Diabetes is not controlled and she has a follow up with Dr. Derrel Gitelman planned. No additional concerns. Review of Systems   Constitutional: Negative for diaphoresis, fever and malaise/fatigue. Respiratory: Negative for cough and shortness of breath. Cardiovascular: Negative for chest pain and palpitations. Gastrointestinal: Negative for abdominal pain, diarrhea, nausea and vomiting. Musculoskeletal: Positive for joint pain (right wrist and hand pain). Negative for back pain, myalgias and neck pain. Neurological: Positive for tingling (right hand 4th/5th digits ). Negative for dizziness, weakness and headaches. Physical Exam   Constitutional: She is oriented to person, place, and time. Vital signs are normal. She appears well-developed and well-nourished. She is cooperative. Non-toxic appearance. She does not have a sickly appearance. She does not appear ill. No distress. Cardiovascular: Normal rate, regular rhythm and normal heart sounds. Pulmonary/Chest: Effort normal and breath sounds normal. No respiratory distress.    Musculoskeletal: She exhibits no edema. Right wrist: Normal. She exhibits normal range of motion, no tenderness, no bony tenderness, no swelling and no crepitus. Left wrist: Normal.        Right hand: She exhibits normal range of motion, no tenderness, normal capillary refill, no deformity, no laceration and no swelling. Normal sensation noted. Normal strength noted. Left hand: Normal. Normal sensation noted. Normal strength noted. Neurological: She is alert and oriented to person, place, and time. Skin: Skin is warm and dry. She is not diaphoretic. Psychiatric: She has a normal mood and affect. Her behavior is normal.   Nursing note and vitals reviewed. ASSESSMENT and PLAN    ICD-10-CM ICD-9-CM    1. Right wrist pain M25.531 719.43 predniSONE (STERAPRED DS) 10 mg dose pack      REFERRAL TO ORTHOPEDICS      XR WRIST RT AP/LAT      XR HAND RT MIN 3 V   2. Numbness and tingling in right hand R20.0 782.0 predniSONE (STERAPRED DS) 10 mg dose pack    R20.2  REFERRAL TO ORTHOPEDICS      XR WRIST RT AP/LAT      XR HAND RT MIN 3 V   3. Insulin dependent diabetes mellitus type IA ([facility] [address] E10.9 250.01    x-ray of hand/wrist ordered and she will obtain these upon leaving. Will notify patient of results and adjust treatment plan as indicated. Prednisone sent to pharmacy. Medication benefits, risks, indication, dosage, potential side effects and alternate medication options were discussed with patient who expressed understanding. Discussed diabetes and follow up should be as soon as possible. Referral to Ortho hand specialist provided to call and follow up if symptoms worsen/persist. Note to return to work today provided as requested. Patient expressed understanding of instructions and agreement with treatment plan.

## 2017-07-26 NOTE — MR AVS SNAPSHOT
Visit Information Date & Time Provider Department Dept. Phone Encounter #  
 7/26/2017  9:40 AM Anita Zepeda [facility] Internal Medicine [phone] [id number] Upcoming Health Maintenance Date Due  
 HPV AGE 9Y-34Y (1 of 3 - Female 3 Dose Series) 4/11/2003 DTaP/Tdap/Td series (1 - Tdap) 4/11/2013 INFLUENZA AGE 9 TO ADULT 8/1/2017 HEMOGLOBIN A1C Q6M 1/26/2018 MICROALBUMIN Q1 2/22/2018 LIPID PANEL Q1 2/22/2018 EYE EXAM RETINAL OR DILATED Q1 2/26/2018 FOOT EXAM Q1 7/26/2018 PAP AKA CERVICAL CYTOLOGY 8/11/2018 Allergies as of 7/26/2017  Review Complete On: 7/26/2017 By: Anita Zepeda NP No Known Allergies Current Immunizations  Never Reviewed No immunizations on file. Not reviewed this visit You Were Diagnosed With   
  
 Codes Comments Right wrist pain    -  Primary ICD-10-CM: M25.531 ICD-9-CM: 719.43 Numbness and tingling in right hand     ICD-10-CM: R20.0, R20.2 ICD-9-CM: 782.0 Insulin dependent diabetes mellitus type IA ([facility]     ICD-10-CM: E10.9 ICD-9-CM: 250.01 Vitals BP Pulse Temp Resp Height(growth percentile) Weight(growth percentile) 110/60 87 97.5 °F (36.4 °C) (Oral) 20 5' 5\" (1.651 m) 129 lb (58.5 kg) LMP BMI OB Status Smoking Status 07/04/2017 21.47 kg/m2 Having regular periods Current Every Day Smoker BMI and BSA Data Body Mass Index Body Surface Area  
 21.47 kg/m 2 1.64 m 2 Preferred Pharmacy Pharmacy Name Phone [facility] [address] [facility], [address],[facility] Floor [phone] Your Updated Medication List  
  
   
This list is accurate as of: 7/26/17 10:44 AM.  Always use your most recent med list.  
  
  
  
  
 aspirin 81 mg tablet Take 81 mg by mouth. Biotin 2,500 mcg Cap Take  by mouth.  
  
 glucagon 1 mg Kit  
1 mg by Injection route as needed. glucose blood VI test strips strip Commonly known as:  FREESTYLE TEST  
100 Each by Does Not Apply route four (4) times daily. insulin glargine 100 unit/mL (3 mL) Inpn Commonly known as:  LANTUS,BASAGLAR  
38 units daily Iron 160 mg (50 mg iron) Tber tablet Generic drug:  ferrous sulfate ER Take 1 Tab by mouth daily. Lancets Misc Commonly known as:  FREESTYLE LANCETS  
2 Packages by Does Not Apply route four (4) times daily. mupirocin 2 % ointment Commonly known as:  [employer] Apply  to affected area daily. NovoLOG Flexpen 100 unit/mL Inpn Generic drug:  insulin aspart  
7 Units by SubCUTAneous route three (3) times daily (with meals). predniSONE 10 mg dose pack Commonly known as:  STERAPRED DS See administration instruction per 10mg dose pack VITAMIN D2 PO Take  by mouth. Prescriptions Sent to Pharmacy Refills  
 predniSONE (STERAPRED DS) 10 mg dose pack 0 Sig: See administration instruction per 10mg dose pack Class: Normal  
 Pharmacy: [id number] [facility], [address] [place] [address],[address] [place] [address] #: [phone] We Performed the Following REFERRAL TO ORTHOPEDICS [[id number] Custom] Comments:  
 Please evaluate patient for wrist/hand pain To-Do List   
 07/26/2017 Imaging:  XR HAND RT MIN 3 V   
  
 07/26/2017 Imaging:  XR WRIST RT AP/LAT Referral Information Referral ID Referred By Referred To  
  
 [id number] [facility] [id number] [place], [address] [place] [address] Rd, [facility] Visits Status Start Date End Date 1 New Request 7/26/17 7/26/18 If your referral has a status of pending review or denied, additional information will be sent to support the outcome of this decision. Patient Instructions Thank you for choosing [address] [place] [address].   We know you have many options when it comes to your healthcare; we appreciate that you picked us. Our goal is to provide exceptional 
service and world class care for every patient. You may receive a survey in the mail or by email asking for your feedback. Please take a few minutes to share your thoughts about your recent visit. Your comments helps us understand what we do well and what we can do better. To ensure confidentiality, this survey is administered by an independent third-party, [address] [place] [address] participation will help us to improve the quality of care that we provide to you, your family, friends, and neighbors. Thank you! Introducing [facility]! Sj Mireles introduces [employer] patient portal. Now you can access parts of your medical record, email your doctor's office, and request medication refills online. 1. In your internet browser, go to [email or web address] 2. Click on the First Time User? Click Here link in the Sign In box. You will see the New Member Sign Up page. 3. Enter your [employer] Access Code exactly as it appears below. You will not need to use this code after youve completed the sign-up process. If you do not sign up before the expiration date, you must request a new code. · [employer] Access Code: [email or web address] Expires: 9/4/2017  1:04 PM 
 
4. Enter the last four digits of your Social Security Number (xxxx) and Date of Birth (mm/dd/yyyy) as indicated and click Submit. You will be taken to the next sign-up page. 5. Create a [employer] ID. This will be your [employer] login ID and cannot be changed, so think of one that is secure and easy to remember. 6. Create a [employer] password. You can change your password at any time. 7. Enter your Password Reset Question and Answer. This can be used at a later time if you forget your password. 8. Enter your e-mail address. You will receive e-mail notification when new information is available in [address] 9. Click Sign Up.  You can now view and download portions of your medical record. 10. Click the Download Summary menu link to download a portable copy of your medical information. If you have questions, please visit the Frequently Asked Questions section of the [employer] website. Remember, [employer] is NOT to be used for urgent needs. For medical emergencies, dial 911. Now available from your iPhone and Android! Please provide this summary of care documentation to your next provider. Your primary care clinician is listed as Mireya Maxwell. If you have any questions after today's visit, please call [phone].

## 2017-07-26 NOTE — PATIENT INSTRUCTIONS
Thank you for choosing [facility]. We know you have many options when it comes to your healthcare; we appreciate that you picked us. Our goal is to provide exceptional  service and world class care for every patient. You may receive a survey in the mail or by email asking for your feedback. Please take a few minutes to share your thoughts about your recent visit. Your comments helps us understand what we do well and what we can do better. To ensure confidentiality, this survey is administered by an independent third-party, [address] [place] [address] participation will help us to improve the quality of care that we provide to you, your family, friends, and neighbors. Thank you! Learning About Diabetes Food Guidelines  Your Care Instructions  Meal planning is important to manage diabetes. It helps keep your blood sugar at a target level (which you set with your doctor). You don't have to eat special foods. You can eat what your family eats, including sweets once in a while. But you do have to pay attention to how often you eat and how much you eat of certain foods. You may want to work with a dietitian or a certified diabetes educator (CDE) to help you plan meals and snacks. A dietitian or CDE can also help you lose weight if that is one of your goals. What should you know about eating carbs? Managing the amount of carbohydrate (carbs) you eat is an important part of healthy meals when you have diabetes. Carbohydrate is found in many foods. · Learn which foods have carbs. And learn the amounts of carbs in different foods. ¨ Bread, cereal, pasta, and rice have about 15 grams of carbs in a serving. A serving is 1 slice of bread (1 ounce), ½ cup of cooked cereal, or 1/3 cup of cooked pasta or rice. ¨ Fruits have 15 grams of carbs in a serving.  A serving is 1 small fresh fruit, such as an apple or orange; ½ of a banana; ½ cup of cooked or canned fruit; ½ cup of fruit juice; 1 cup of melon or raspberries; or 2 tablespoons of dried fruit. ¨ Milk and no-sugar-added yogurt have 15 grams of carbs in a serving. A serving is 1 cup of milk or 2/3 cup of no-sugar-added yogurt. ¨ Starchy vegetables have 15 grams of carbs in a serving. A serving is ½ cup of mashed potatoes or sweet potato; 1 cup winter squash; ½ of a small baked potato; ½ cup of cooked beans; or ½ cup cooked corn or green peas. · Learn how much carbs to eat each day and at each meal. A dietitian or CDE can teach you how to keep track of the amount of carbs you eat. This is called carbohydrate counting. · If you are not sure how to count carbohydrate grams, use the Plate Method to plan meals. It is a good, quick way to make sure that you have a balanced meal. It also helps you spread carbs throughout the day. ¨ Divide your plate by types of foods. Put non-starchy vegetables on half the plate, meat or other protein food on one-quarter of the plate, and a grain or starchy vegetable in the final quarter of the plate. To this you can add a small piece of fruit and 1 cup of milk or yogurt, depending on how many carbs you are supposed to eat at a meal.  · Try to eat about the same amount of carbs at each meal. Do not \"save up\" your daily allowance of carbs to eat at one meal.  · Proteins have very little or no carbs per serving. Examples of proteins are beef, chicken, turkey, fish, eggs, tofu, cheese, cottage cheese, and peanut butter. A serving size of meat is 3 ounces, which is about the size of a deck of cards. Examples of meat substitute serving sizes (equal to 1 ounce of meat) are 1/4 cup of cottage cheese, 1 egg, 1 tablespoon of peanut butter, and ½ cup of tofu. How can you eat out and still eat healthy? · Learn to estimate the serving sizes of foods that have carbohydrate. If you measure food at home, it will be easier to estimate the amount in a serving of restaurant food.   · If the meal you order has too much carbohydrate (such as potatoes, corn, or baked beans), ask to have a low-carbohydrate food instead. Ask for a salad or green vegetables. · If you use insulin, check your blood sugar before and after eating out to help you plan how much to eat in the future. · If you eat more carbohydrate at a meal than you had planned, take a walk or do other exercise. This will help lower your blood sugar. What else should you know? · Limit saturated fat, such as the fat from meat and dairy products. This is a healthy choice because people who have diabetes are at higher risk of heart disease. So choose lean cuts of meat and nonfat or low-fat dairy products. Use olive or canola oil instead of butter or shortening when cooking. · Don't skip meals. Your blood sugar may drop too low if you skip meals and take insulin or certain medicines for diabetes. · Check with your doctor before you drink alcohol. Alcohol can cause your blood sugar to drop too low. Alcohol can also cause a bad reaction if you take certain diabetes medicines. Follow-up care is a key part of your treatment and safety. Be sure to make and go to all appointments, and call your doctor if you are having problems. It's also a good idea to know your test results and keep a list of the medicines you take. Where can you learn more? Go to http://sandro-apolinar.info/. Enter Q998 in the search box to learn more about \"Learning About Diabetes Food Guidelines. \"  Current as of: March 13, 2017  Content Version: 11.3  © 5486-9840 [employer]. Care instructions adapted under license by [employer] (which disclaims liability or warranty for this information). If you have questions about a medical condition or this instruction, always ask your healthcare professional. [place] [address] any warranty or liability for your use of this information. Learning About Meal Planning for Diabetes  Why plan your meals?   Meal planning can be a key part of managing diabetes. Planning meals and snacks with the right balance of carbohydrate, protein, and fat can help you keep your blood sugar at the target level you set with your doctor. You don't have to eat special foods. You can eat what your family eats, including sweets once in a while. But you do have to pay attention to how often you eat and how much you eat of certain foods. You may want to work with a dietitian or a certified diabetes educator. He or she can give you tips and meal ideas and can answer your questions about meal planning. This health professional can also help you reach a healthy weight if that is one of your goals. What plan is right for you? Your dietitian or diabetes educator may suggest that you start with the plate format or carbohydrate counting. The plate format  The plate format is a simple way to help you manage how you eat. You plan meals by learning how much space each food should take on a plate. Using the plate format helps you spread carbohydrate throughout the day. It can make it easier to keep your blood sugar level within your target range. It also helps you see if you're eating healthy portion sizes. To use the plate format, you put non-starchy vegetables on half your plate. Add meat or meat substitutes on one-quarter of the plate. Put a grain or starchy vegetable (such as brown rice or a potato) on the final quarter of the plate. You can add a small piece of fruit and some low-fat or fat-free milk or yogurt, depending on your carbohydrate goal for each meal.  Here are some tips for using the plate format:  · Make sure that you are not using an oversized plate. A 9-inch plate is best. Many restaurants use larger plates. · Get used to using the plate format at home. Then you can use it when you eat out. · Write down your questions about using the plate format. Talk to your doctor, a dietitian, or a diabetes educator about your concerns.   Carbohydrate counting  With carbohydrate counting, you plan meals based on the amount of carbohydrate in each food. Carbohydrate raises blood sugar higher and more quickly than any other nutrient. It is found in desserts, breads and cereals, and fruit. It's also found in starchy vegetables such as potatoes and corn, grains such as rice and pasta, and milk and yogurt. Spreading carbohydrate throughout the day helps keep your blood sugar levels within your target range. Your daily amount depends on several things, including your weight, how active you are, which diabetes medicines you take, and what your goals are for your blood sugar levels. A registered dietitian or diabetes educator can help you plan how much carbohydrate to include in each meal and snack. A guideline for your daily amount of carbohydrate is:  · 45 to 60 grams at each meal. That's about the same as 3 to 4 carbohydrate servings. · 15 to 20 grams at each snack. That's about the same as 1 carbohydrate serving. The Nutrition Facts label on packaged foods tells you how much carbohydrate is in a serving of the food. First, look at the serving size on the food label. Is that the amount you eat in a serving? All of the nutrition information on a food label is based on that serving size. So if you eat more or less than that, you'll need to adjust the other numbers. Total carbohydrate is the next thing you need to look for on the label. If you count carbohydrate servings, one serving of carbohydrate is 15 grams. For foods that don't come with labels, such as fresh fruits and vegetables, you'll need a guide that lists carbohydrate in these foods. Ask your doctor, dietitian, or diabetes educator about books or other nutrition guides you can use. If you take insulin, you need to know how many grams of carbohydrate are in a meal. This lets you know how much rapid-acting insulin to take before you eat.  If you use an insulin pump, you get a constant rate of insulin during the day. So the pump must be programmed at meals to give you extra insulin to cover the rise in blood sugar after meals. When you know how much carbohydrate you will eat, you can take the right amount of insulin. Or, if you always use the same amount of insulin, you need to make sure that you eat the same amount of carbohydrate at meals. If you need more help to understand carbohydrate counting and food labels, ask your doctor, dietitian, or diabetes educator. How do you get started with meal planning? Here are some tips to get started:  · Plan your meals a week at a time. Don't forget to include snacks too. · Use cookbooks or online recipes to plan several main meals. Plan some quick meals for busy nights. You also can double some recipes that freeze well. Then you can save half for other busy nights when you don't have time to cook. · Make sure you have the ingredients you need for your recipes. If you're running low on basic items, put these items on your shopping list too. · List foods that you use to make breakfasts, lunches, and snacks. List plenty of fruits and vegetables. · Post this list on the refrigerator. Add to it as you think of more things you need. · Take the list to the store to do your weekly shopping. Follow-up care is a key part of your treatment and safety. Be sure to make and go to all appointments, and call your doctor if you are having problems. It's also a good idea to know your test results and keep a list of the medicines you take. Where can you learn more? Go to http://sandro-apolinar.info/. Lizbet Garcia in the search box to learn more about \"Learning About Meal Planning for Diabetes. \"  Current as of: March 13, 2017  Content Version: 11.3  © 9859-1535 [employer]. Care instructions adapted under license by [employer] (which disclaims liability or warranty for this information).  If you have questions about a medical condition or this instruction, always ask your healthcare professional. [address] any warranty or liability for your use of this information. Type 1 Diabetes: Care Instructions  Your Care Instructions    Type 1 diabetes is a lifelong disease that develops when the pancreas stops making insulin. The body needs insulin to let sugar (glucose) move from the blood into the body's cells, where it can be used for energy or stored for later use. Without insulin, the sugar cannot get into the cells to do its work. It stays in the blood instead. This can cause high blood sugar levels. A person has diabetes when the blood sugar is too high. Over time, diabetes can lead to diseases of the heart, blood vessels, nerves, kidneys, and eyes. To treat type 1 diabetes, you need insulin. You can give yourself insulin through an insulin pump, an insulin pen, or a syringe (needle). Insulin, exercise, and a healthy diet can help prevent or delay problems from diabetes. With education and support, you will treat diabetes as a part of your life--not your whole life. Seek support when you need it from your family, friends, and your doctor or other diabetes experts. Follow-up care is a key part of your treatment and safety. Be sure to make and go to all appointments, and call your doctor if you are having problems. It's also a good idea to know your test results and keep a list of the medicines you take. How can you care for yourself at home? · Take your insulin on time and in the right dose. This helps keep your blood sugar steady. Do not stop or change your insulin without talking to your doctor first.  · Check and record your blood sugar as often as directed. These records can help your doctor see how you are doing and adjust your treatment if needed. It is important to keep track of any symptoms you have, such as low blood sugar, and any changes in your activities, diet, or insulin use.   · Eat a good diet that spreads carbohydrate throughout the day. Carbohydrate--the body's main source of fuel--affects blood sugar more than any other nutrient. Carbohydrate is in fruits, vegetables, milk, and yogurt. It also is in breads, cereals, vegetables such as potatoes and corn, and sugary foods such as candy and cakes. · Aim for 30 minutes of exercise on most, preferably all, days of the week. Walking is a good choice. You also may want to do other activities, such as running, swimming, cycling, or playing tennis or team sports. If your doctor says it's okay, do muscle-strengthening exercises at least 2 times a week. · Control your cholesterol, and try to keep your blood pressure at 140/90 or below. Exercise and healthy eating can help with these goals. If you have medicine for cholesterol or high blood pressure, take it as directed. Do not stop or change a medicine without talking to your doctor first.  · If you have discussed it with your doctor, take a low-dose aspirin every day to help prevent heart attack and stroke. Do not start taking aspirin unless your doctor knows about it. · Do not smoke. If you need help quitting, talk to your doctor about stop-smoking programs and medicines. These can increase your chances of quitting for good. · Check your feet daily for blisters, cracks, and sores. Have your doctor look at your feet whenever you have a checkup. · Get a checkup every 3 to 6 months. Your doctor will tell you how often to come in. You will need regular tests such as:  ¨ A hemoglobin A1c test. You may need this test more often than once a year. It is a good measure of how well your treatment is working. ¨ A cholesterol test.  ¨ A urine test for protein. This checks for kidney problems. ¨ A complete foot exam.  ¨ An eye exam, even if you do not think your vision has changed. When should you call for help? Call 911 anytime you think you may need emergency care.  For example, call if:  · You passed out (lost consciousness), or you suddenly become very sleepy or confused. (You may have very low blood sugar.)  · You have symptoms of high blood sugar, such as:  ¨ Blurred vision. ¨ Trouble staying awake or being woken up. ¨ Fast, deep breathing. ¨ Breath that smells fruity. ¨ Belly pain, not feeling hungry, and vomiting. ¨ Feeling confused. Call your doctor now or seek immediate medical care if:  · Your blood sugar stays higher than the level your doctor has set for you. · You have symptoms of low blood sugar, such as:  ¨ Sweating. ¨ Feeling nervous, shaky, and weak. ¨ Extreme hunger and slight nausea. ¨ Dizziness and headache. ¨ Blurred vision. ¨ Confusion. Watch closely for changes in your health, and be sure to contact your doctor if:  · You often have problems controlling your blood sugar. · You have symptoms of long-term diabetes problems, such as:  ¨ New vision changes. ¨ New pain, numbness, or tingling in your hands or feet. ¨ Skin problems. Where can you learn more? Go to http://sandro-apolinar.info/. Enter P859 in the search box to learn more about \"Type 1 Diabetes: Care Instructions. \"  Current as of: March 13, 2017  Content Version: 11.3  © 5471-2234 [employer]. Care instructions adapted under license by [employer] (which disclaims liability or warranty for this information). If you have questions about a medical condition or this instruction, always ask your healthcare professional. [place] [address] any warranty or liability for your use of this information. Diabetic Neuropathy: Care Instructions  Your Care Instructions  When you have diabetes, your blood sugar level may get too high. Over time, high blood sugar levels can damage nerves. This is called diabetic neuropathy. Nerve damage can cause pain, burning, tingling, and numbness and may leave you feeling weak. The feet are often affected.  When you have nerve damage in your feet, you cannot feel your feet and toes as well as normal and may not notice cuts or sores. Even a small injury can lead to a serious infection. It is very important that you follow your doctor's advice on foot care. Sometimes diabetes damages nerves that help the body function. If this happens, your blood pressure, sweating, digestion, and urination might be affected. Your doctor may give you a target blood sugar level that is higher or lower than you are used to. Try to keep your blood sugar very close to this target level to prevent more damage. Follow-up care is a key part of your treatment and safety. Be sure to make and go to all appointments, and call your doctor if you are having problems. It's also a good idea to know your test results and keep a list of the medicines you take. How can you care for yourself at home? · Take your medicines exactly as prescribed. Call your doctor if you think you are having a problem with your medicine. It is very important that you take your insulin or diabetes pills as your doctor tells you. · Try to keep blood sugar at your target level. ¨ Eat a variety of healthy foods, with carbohydrate spread out in your meals. A dietitian can help you plan meals. ¨ Try to get at least 30 minutes of exercise on most days. ¨ Check your blood sugar as many times each day as your doctor recommends. · Take and record your blood pressure at home if your doctor tells you to. Learn the importance of the two measures of blood pressure (such as 130 over 80, or 130/80). To take your blood pressure at home:  ¨ Ask your doctor to check your blood pressure monitor to be sure it is accurate and the cuff fits you. Also ask your doctor to watch you to make sure that you are using it right. ¨ Do not use medicine known to raise blood pressure (such as some nasal decongestant sprays) before taking your blood pressure.   ¨ Avoid taking your blood pressure if you have just exercised or are nervous or upset. Rest at least 15 minutes before you take a reading. · Take pain medicines exactly as directed. ¨ If the doctor gave you a prescription medicine for pain, take it as prescribed. ¨ If you are not taking a prescription pain medicine, ask your doctor if you can take an over-the-counter medicine. · Do not smoke. Smoking can increase your chance for a heart attack or stroke. If you need help quitting, talk to your doctor about stop-smoking programs and medicines. These can increase your chances of quitting for good. · Limit alcohol to 2 drinks a day for men and 1 drink a day for women. Too much alcohol can cause health problems. · Eat small meals often, rather than 2 or 3 large meals a day. To care for your feet  · Prevent injury by wearing shoes at all times, even when you are indoors. · Do foot care as part of your daily routine. Wash your feet and then rub lotion on your feet, but not between your toes. Use a handheld mirror or magnifying mirror to inspect your feet for blisters, cuts, cracks, or sores. · Have your toenails trimmed and filed straight across. · Wear shoes and socks that fit well. Soft shoes that have good support and that fit well (such as tennis shoes) are best for your feet. · Check your shoes for any loose objects or rough edges before you put them on. · Ask your doctor to check your feet during each visit. Your doctor may notice a foot problem you have missed. · Get early treatment for any foot problem, even a minor one. When should you call for help? Call your doctor now or seek immediate medical care if:  · You have symptoms of infection, such as:  ¨ Increased pain, swelling, warmth, or redness. ¨ Red streaks leading from the area. ¨ Pus draining from the area. ¨ A fever. · You have new or worse numbness, pain, or tingling in any part of your body.   Watch closely for changes in your health, and be sure to contact your doctor if:  · You have a new problem with your feet, such as:  ¨ A new sore or ulcer. ¨ A break in the skin that is not healing after several days. ¨ Bleeding corns or calluses. ¨ An ingrown toenail. · You do not get better as expected. Where can you learn more? Go to http://sandro-apolinar.info/. Enter M510 in the search box to learn more about \"Diabetic Neuropathy: Care Instructions. \"  Current as of: March 13, 2017  Content Version: 11.3  © 4066-3994 [employer]. Care instructions adapted under license by [employer] (which disclaims liability or warranty for this information). If you have questions about a medical condition or this instruction, always ask your healthcare professional. [address] any warranty or liability for your use of this information. Joint Pain: Care Instructions  Your Care Instructions  Many people have small aches and pains from overuse or injury to muscles and joints. Joint injuries often happen during sports or recreation, work tasks, or projects around the home. An overuse injury can happen when you put too much stress on a joint or when you do an activity that stresses the joint over and over, such as using the computer or rowing a boat. You can take action at home to help your muscles and joints get better. You should feel better in 1 to 2 weeks, but it can take 3 months or more to heal completely. Follow-up care is a key part of your treatment and safety. Be sure to make and go to all appointments, and call your doctor if you are having problems. It's also a good idea to know your test results and keep a list of the medicines you take. How can you care for yourself at home? · Do not put weight on the injured joint for at least a day or two. · For the first day or two after an injury, do not take hot showers or baths, and do not use hot packs. The heat could make swelling worse.   · Put ice or a cold pack on the sore joint for 10 to 20 minutes at a time. Try to do this every 1 to 2 hours for the next 3 days (when you are awake) or until the swelling goes down. Put a thin cloth between the ice and your skin. · Wrap the injury in an elastic bandage. Do not wrap it too tightly because this can cause more swelling. · Prop up the sore joint on a pillow when you ice it or anytime you sit or lie down during the next 3 days. Try to keep it above the level of your heart. This will help reduce swelling. · Take an over-the-counter pain medicine, such as acetaminophen (Tylenol), ibuprofen (Advil, Motrin), or naproxen (Aleve). Read and follow all instructions on the label. · After 1 or 2 days of rest, begin moving the joint gently. While the joint is still healing, you can begin to exercise using activities that do not strain or hurt the painful joint. When should you call for help? Call your doctor now or seek immediate medical care if:  · You have signs of infection, such as:  ¨ Increased pain, swelling, warmth, and redness. ¨ Red streaks leading from the joint. ¨ A fever. Watch closely for changes in your health, and be sure to contact your doctor if:  · Your movement or symptoms are not getting better after 1 to 2 weeks of home treatment. Where can you learn more? Go to http://sandro-apolinar.info/. Enter P205 in the search box to learn more about \"Joint Pain: Care Instructions. \"  Current as of: March 21, 2017  Content Version: 11.3  © 2746-1039 [employer]. Care instructions adapted under license by [employer] (which disclaims liability or warranty for this information). If you have questions about a medical condition or this instruction, always ask your healthcare professional. [place] [address] any warranty or liability for your use of this information.        Musculoskeletal Pain: Care Instructions  Your Care Instructions  Different problems with the bones, muscles, nerves, ligaments, and tendons in the body can cause pain. One or more areas of your body may ache or burn. Or they may feel tired, stiff, or sore. The medical term for this type of pain is musculoskeletal pain. It can have many different causes. Sometimes the pain is caused by an injury such as a strain or sprain. Or you might have pain from using one part of your body in the same way over and over again. This is called overuse. In some cases, the cause of the pain is another health problem such as arthritis or fibromyalgia. The doctor will examine you and ask you questions about your health to help find the cause of your pain. Blood tests or imaging tests like an X-ray may also be helpful. But sometimes doctors can't find a cause of the pain. Treatment depends on your symptoms and the cause of the pain, if known. The doctor has checked you carefully, but problems can develop later. If you notice any problems or new symptoms, get medical treatment right away. Follow-up care is a key part of your treatment and safety. Be sure to make and go to all appointments, and call your doctor if you are having problems. It's also a good idea to know your test results and keep a list of the medicines you take. How can you care for yourself at home? · Rest until you feel better. · Do not do anything that makes the pain worse. Return to exercise gradually if you feel better and your doctor says it's okay. · Be safe with medicines. Read and follow all instructions on the label. ¨ If the doctor gave you a prescription medicine for pain, take it as prescribed. ¨ If you are not taking a prescription pain medicine, ask your doctor if you can take an over-the-counter medicine. · Put ice or a cold pack on the area for 10 to 20 minutes at a time to ease pain. Put a thin cloth between the ice and your skin. When should you call for help? Call your doctor now or seek immediate medical care if:  · You have new pain, or your pain gets worse.   · You have new symptoms such as a fever, a rash, or chills. Watch closely for changes in your health, and be sure to contact your doctor if:  · You do not get better as expected. Where can you learn more? Go to http://sandro-apolinar.info/. Enter P770 in the search box to learn more about \"Musculoskeletal Pain: Care Instructions. \"  Current as of: October 14, 2016  Content Version: 11.3  © 5064-3090 [employer]. Care instructions adapted under license by [employer] (which disclaims liability or warranty for this information). If you have questions about a medical condition or this instruction, always ask your healthcare professional. [address] any warranty or liability for your use of this information. Numbness and Tingling: Care Instructions  Your Care Instructions  Many things can cause numbness or tingling. Swelling may put pressure on a nerve. This could cause you to lose feeling or have a pins-and-needles sensation on part of your body. Nerves may be damaged from trauma, toxins, or diseases, such as diabetes or multiple sclerosis (MS). Sometimes, though, the cause is not clear. If there is no clear reason for your symptoms, and you are not having any other symptoms, your doctor may suggest watching and waiting for a while to see if the numbness or tingling goes away on its own. Your doctor may want you to have blood or nerve tests to find the cause of your symptoms. Follow-up care is a key part of your treatment and safety. Be sure to make and go to all appointments, and call your doctor if you are having problems. It's also a good idea to know your test results and keep a list of the medicines you take. How can you care for yourself at home? · If your doctor prescribes medicine, take it exactly as directed. Call your doctor if you think you are having a problem with your medicine.   · If you have any swelling, put ice or a cold pack on the area for 10 to 20 minutes at a time. Put a thin cloth between the ice and your skin. When should you call for help? Call 911 anytime you think you may need emergency care. For example, call if:  · You have weakness, numbness, or tingling in both legs. · You lose bowel or bladder control. · You have symptoms of a stroke. These may include:  ¨ Sudden numbness, tingling, weakness, or loss of movement in your face, arm, or leg, especially on only one side of your body. ¨ Sudden vision changes. ¨ Sudden trouble speaking. ¨ Sudden confusion or trouble understanding simple statements. ¨ Sudden problems with walking or balance. ¨ A sudden, severe headache that is different from past headaches. Watch closely for changes in your health, and be sure to contact your doctor if you have any problems, or if:  · You do not get better as expected. Where can you learn more? Go to http://sandro-apolinar.info/. Enter Y490 in the search box to learn more about \"Numbness and Tingling: Care Instructions. \"  Current as of: October 14, 2016  Content Version: 11.3  © 6431-6382 [employer]. Care instructions adapted under license by [employer] (which disclaims liability or warranty for this information). If you have questions about a medical condition or this instruction, always ask your healthcare professional. [address] any warranty or liability for your use of this information.

## 2017-08-14 DIAGNOSIS — E10.9 INSULIN DEPENDENT DIABETES MELLITUS TYPE IA (HCC): ICD-10-CM

## 2017-08-14 RX ORDER — INSULIN GLARGINE 100 [IU]/ML
INJECTION, SOLUTION SUBCUTANEOUS
Qty: 3 PEN | Refills: 11 | Status: SHIPPED | OUTPATIENT
Start: 2017-08-14 | End: 2017-11-06 | Stop reason: SDUPTHER

## 2017-11-06 DIAGNOSIS — E10.9 INSULIN DEPENDENT DIABETES MELLITUS TYPE IA (HCC): ICD-10-CM

## 2017-11-07 RX ORDER — INSULIN GLARGINE 100 [IU]/ML
INJECTION, SOLUTION SUBCUTANEOUS
Qty: 3 PEN | Refills: 1 | Status: SHIPPED | OUTPATIENT
Start: 2017-11-07 | End: 2017-11-10 | Stop reason: SDUPTHER

## 2017-11-10 DIAGNOSIS — E10.9 TYPE 1 DIABETES MELLITUS WITHOUT COMPLICATION (HCC): ICD-10-CM

## 2017-11-10 DIAGNOSIS — E10.9 INSULIN DEPENDENT DIABETES MELLITUS TYPE IA (HCC): ICD-10-CM

## 2017-11-10 DIAGNOSIS — E10.65 HYPERGLYCEMIA DUE TO TYPE 1 DIABETES MELLITUS (HCC): ICD-10-CM

## 2017-11-10 DIAGNOSIS — E16.2 HYPOGLYCEMIA: ICD-10-CM

## 2017-11-10 RX ORDER — INSULIN GLARGINE 100 [IU]/ML
INJECTION, SOLUTION SUBCUTANEOUS
Qty: 3 PEN | Refills: 1 | Status: SHIPPED | OUTPATIENT
Start: 2017-11-10 | End: 2018-01-11 | Stop reason: SDUPTHER

## 2017-11-10 RX ORDER — INSULIN GLARGINE 100 [IU]/ML
INJECTION, SOLUTION SUBCUTANEOUS
Qty: 3 PEN | Refills: 1 | Status: CANCELLED | OUTPATIENT
Start: 2017-11-10

## 2017-11-10 RX ORDER — INSULIN ASPART 100 [IU]/ML
7 INJECTION, SOLUTION INTRAVENOUS; SUBCUTANEOUS
Qty: 3 PEN | Refills: 0 | Status: SHIPPED | OUTPATIENT
Start: 2017-11-10 | End: 2018-01-15 | Stop reason: SDUPTHER

## 2017-11-10 RX ORDER — PEN NEEDLE, DIABETIC 30 GX3/16"
NEEDLE, DISPOSABLE MISCELLANEOUS
Qty: 1 PACKAGE | Refills: 11 | Status: SHIPPED | OUTPATIENT
Start: 2017-11-10 | End: 2018-01-15 | Stop reason: SDUPTHER

## 2017-11-10 RX ORDER — INSULIN ASPART 100 [IU]/ML
INJECTION, SOLUTION INTRAVENOUS; SUBCUTANEOUS
Status: CANCELLED | OUTPATIENT
Start: 2017-11-10

## 2017-11-10 NOTE — TELEPHONE ENCOUNTER
Called [facility] to confirm they received and if any issue then to please contact our office. VO given from Dr Meenu Downing to send as pt would run out by elis, advised pt to make an appt before the next refill and she voiced understanding.

## 2017-11-10 NOTE — TELEPHONE ENCOUNTER
Patient is out of her Lantus and Novolog. I made sure to tell her that it does require a 48 hour turn around time for future reference. She also requested a prescription for BD Pen Needles. Thanks!

## 2017-11-10 NOTE — TELEPHONE ENCOUNTER
[facility] called to notify us that the insulin is $350 and wanted to speak with Christie Palomares, said that the patient probably will not pay that amount, if there is a possibility to switch it. Thanks!

## 2017-11-13 NOTE — TELEPHONE ENCOUNTER
Spoke with pharmacy today at this time and was told that both novolog and Lantus were sold to the pt for 24 dollars each, they were awaiting insurance which came through on Friday. No need to do any PA.

## 2017-11-21 ENCOUNTER — HOSPITAL ENCOUNTER (OUTPATIENT)
Dept: LAB | Age: 27
Discharge: HOME | End: 2017-11-21
Payer: OTHER GOVERNMENT

## 2017-11-21 ENCOUNTER — OFFICE VISIT (OUTPATIENT)
Dept: INTERNAL MEDICINE CLINIC | Age: 27
End: 2017-11-21

## 2017-11-21 VITALS
BODY MASS INDEX: 23.39 KG/M2 | RESPIRATION RATE: 15 BRPM | HEART RATE: 80 BPM | OXYGEN SATURATION: 97 % | HEIGHT: 65 IN | DIASTOLIC BLOOD PRESSURE: 62 MMHG | SYSTOLIC BLOOD PRESSURE: 100 MMHG | TEMPERATURE: 98.8 F | WEIGHT: 140.4 LBS

## 2017-11-21 DIAGNOSIS — E10.9 INSULIN DEPENDENT DIABETES MELLITUS TYPE IA (HCC): Primary | ICD-10-CM

## 2017-11-21 DIAGNOSIS — Z01.419 WELL WOMAN EXAM WITH ROUTINE GYNECOLOGICAL EXAM: ICD-10-CM

## 2017-11-21 PROCEDURE — 88175 CYTOPATH C/V AUTO FLUID REDO: CPT | Performed by: INTERNAL MEDICINE

## 2017-11-21 NOTE — PROGRESS NOTES
Chief Complaint   Patient presents with    Other     with pap states that she is not ready for blood work and has eaten

## 2017-11-21 NOTE — MR AVS SNAPSHOT
Visit Information Date & Time Provider Department Dept. Phone Encounter #  
 11/21/2017  8:30 AM Cal Spurling, MD [facility] Internal Medicine [phone] [id number] Upcoming Health Maintenance Date Due MICROALBUMIN Q1 2/22/2018 EYE EXAM RETINAL OR DILATED Q1 2/26/2018 HEMOGLOBIN A1C Q6M 5/21/2018 FOOT EXAM Q1 7/26/2018 PAP AKA CERVICAL CYTOLOGY 8/11/2018 LIPID PANEL Q1 11/21/2018 DTaP/Tdap/Td series (2 - Td) 10/13/2025 Allergies as of 11/21/2017  Review Complete On: 11/21/2017 By: Blair Elizabeth LPN No Known Allergies Current Immunizations  Never Reviewed No immunizations on file. Not reviewed this visit You Were Diagnosed With   
  
 Codes Comments Insulin dependent diabetes mellitus type [facility] ([facility]    -  Primary ICD-10-CM: E10.9 ICD-9-CM: 250.01 Well woman exam with routine gynecological exam     ICD-10-CM: Z59.662 ICD-9-CM: V72.31 Vitals BP Pulse Temp Resp Height(growth percentile) Weight(growth percentile) 100/62 (BP 1 Location: Right arm, BP Patient Position: Sitting) 80 98.8 °F (37.1 °C) 15 5' 5\" (1.651 m) 140 lb 6.4 oz (63.7 kg) LMP SpO2 BMI OB Status Smoking Status 10/30/2017 97% 23.36 kg/m2 Having regular periods Current Every Day Smoker BMI and BSA Data Body Mass Index Body Surface Area  
 23.36 kg/m 2 1.71 m 2 Preferred Pharmacy Pharmacy Name Phone [facility] [address] [facility], [address] [place] [address],[facility] Floor [phone] Your Updated Medication List  
  
   
This list is accurate as of: 11/21/17  9:26 AM.  Always use your most recent med list.  
  
  
  
  
 aspirin 81 mg tablet Take 81 mg by mouth. Biotin 2,500 mcg Cap Take  by mouth.  
  
 glucagon 1 mg Kit  
1 mg by Injection route as needed. glucose blood VI test strips strip Commonly known as:  FREESTYLE TEST  
100 Each by Does Not Apply route four (4) times daily. insulin aspart 100 unit/mL Inpn Commonly known as:  Mario Viramontes 7 Units by SubCUTAneous route three (3) times daily (with meals). insulin glargine 100 unit/mL (3 mL) Inpn Commonly known as:  SUNNY MOSLEYAGLAR  
38 units daily Insulin Needles (Disposable) 31 gauge x 5/16\" Ndle  
by SubCUTAneous route three (3) times daily (with meals). Iron 160 mg (50 mg iron) Tber tablet Generic drug:  ferrous sulfate ER Take 1 Tab by mouth daily. Lancets Misc Commonly known as:  FREESTYLE LANCETS  
2 Packages by Does Not Apply route four (4) times daily. mupirocin 2 % ointment Commonly known as:  [employer] Apply  to affected area daily. predniSONE 10 mg dose pack Commonly known as:  STERAPRED DS See administration instruction per 10mg dose pack VITAMIN D2 PO Take  by mouth. We Performed the Following PAP IG, [address] HPV ASCU, [id number]) [[id number] Custom] To-Do List   
 11/27/2017 Lab:  CBC W/O DIFF   
  
 11/27/2017 Lab:  HEMOGLOBIN A1C WITH EAG   
  
 11/27/2017 Lab:  LIPID PANEL   
  
 11/27/2017 Lab:  METABOLIC PANEL, COMPREHENSIVE   
  
 11/27/2017 Lab:  TSH 3RD GENERATION   
  
 11/27/2017 Lab:  VITAMIN D, 25 HYDROXY Introducing [facility] & HE[facility]! [facility] introduces "[employer]" patient portal. Now you can access parts of your medical record, email your doctor's office, and request medication refills online. 1. In your internet browser, go to [email or web address] 2. Click on the First Time User? Click Here link in the Sign In box. You will see the New Member Sign Up page. 3. Enter your "[employer]" Access Code exactly as it appears below. You will not need to use this code after youve completed the sign-up process. If you do not sign up before the expiration date, you must request a new code. · "[employer]" Access Code: [email or web address] Expires: 2/19/2018  9:22 AM 
 
 4. Enter the last four digits of your Social Security Number (xxxx) and Date of Birth (mm/dd/yyyy) as indicated and click Submit. You will be taken to the next sign-up page. 5. Create a [employer] ID. This will be your [employer] login ID and cannot be changed, so think of one that is secure and easy to remember. 6. Create a [employer] password. You can change your password at any time. 7. Enter your Password Reset Question and Answer. This can be used at a later time if you forget your password. 8. Enter your e-mail address. You will receive e-mail notification when new information is available in [address] 9. Click Sign Up. You can now view and download portions of your medical record. 10. Click the Download Summary menu link to download a portable copy of your medical information. If you have questions, please visit the Frequently Asked Questions section of the [employer] website. Remember, [employer] is NOT to be used for urgent needs. For medical emergencies, dial 911. Now available from your iPhone and Android! Please provide this summary of care documentation to your next provider. Your primary care clinician is listed as [facility]. If you have any questions after today's visit, please call [phone].

## 2017-11-21 NOTE — PROGRESS NOTES
Written by Pham Xie, as dictated by Dr. Rob Isabel MD.    Carlos Ortez is a 24 y.o. female. HPI  The patient comes in today for a Pap smear. She is not on any oral contraceptives at this time but has been thinking about restarting. Her last few menstrual cycles have been irregular, but she attributes this to stress at work and otherwise does not have any other issues. She is sexually active and uses condoms for protection. She checks her breasts every month. She has been checking her BS more than she has before, and she has brought it down from the 200s to the 100s. Her HA1c in 02/2017 was 13.4% but she does not think it will have improved much recently. Her insurance is changing next year so she has not gotten an insulin pump yet. She would like a referral to endocrinology. Since her last visit she has been feeling much happier and much healthier. She did quit smoking. She has also gained weight, from 129 lbs in 07/2017 to 140 lbs today, which she is happy with. She was vegetarian/vegan in the past, but once she stopped those diets and planning out her meals she has been putting on weight and eating much healthier. She has some rashes which she attributes to dry skin, which she gets each year. She denies constipation/diarrhea or unusual vaginal discharge (though she did have a yeast infection a few weeks ago, for which she used Monistat which cleared it up). She had all 3 HPV vaccines 3-4 years ago. Her last Tdap was in 2015.     Patient Active Problem List   Diagnosis Code    Type I (juvenile type) diabetes mellitus without mention of complication, uncontrolled E10.65    Borderline personality disorder F60.3    Insulin dependent diabetes mellitus type IA ([facility] E10.9    Generalized anxiety disorder F41.1        Current Outpatient Prescriptions on File Prior to Visit   Medication Sig Dispense Refill    insulin glargine (LANTUS,BASAGLAR) 100 unit/mL (3 mL) inpn 38 units daily 3 Pen 1    insulin aspart (NOVOLOG FLEXPEN) 100 unit/mL inpn 7 Units by SubCUTAneous route three (3) times daily (with meals). 3 Pen 0    Insulin Needles, Disposable, 31 gauge x 5/16\" ndle by SubCUTAneous route three (3) times daily (with meals). 1 Package 11    Biotin 2,500 mcg cap Take  by mouth.  ERGOCALCIFEROL, VITAMIN D2, (VITAMIN D PO) Take  by mouth.  aspirin 81 mg tablet Take 81 mg by mouth.  Lancets (FREESTYLE LANCETS) Misc 2 Packages by Does Not Apply route four (4) times daily. 100 Each 6    glucose blood VI test strips (FREESTYLE TEST) strip 100 Each by Does Not Apply route four (4) times daily. 2 Package 6    predniSONE (STERAPRED DS) 10 mg dose pack See administration instruction per 10mg dose pack 21 Tab 0    mupirocin (BACTROBAN) 2 % ointment Apply  to affected area daily. 22 g 0    ferrous sulfate ER (IRON) 160 mg (50 mg iron) TbER tablet Take 1 Tab by mouth daily.  glucagon 1 mg Kit 1 mg by Injection route as needed. 1 Package 2     No current facility-administered medications on file prior to visit. Past Medical History:   Diagnosis Date    Diabetes mellitus        Family History   Problem Relation Age of Onset    Cancer Paternal Grandfather        Social History     Social History    Marital status: SINGLE     Spouse name: N/A    Number of children: N/A    Years of education: N/A     Occupational History    Not on file. Social History Main Topics    Smoking status: Current Every Day Smoker     Packs/day: 0.25    Smokeless tobacco: Never Used    Alcohol use No    Drug use: No    Sexual activity: Yes     Partners: Male     Other Topics Concern    Not on file     Social History Narrative       Review of Systems   Constitutional: Negative for malaise/fatigue. HENT: Negative for congestion. Respiratory: Negative for cough and shortness of breath. Gastrointestinal: Negative for constipation and diarrhea. Musculoskeletal: Negative for joint pain and myalgias. Skin: Positive for rash. Neurological: Negative for weakness. Visit Vitals    /62 (BP 1 Location: Right arm, BP Patient Position: Sitting)    Pulse 80    Temp 98.8 °F (37.1 °C)    Resp 15    Ht 5' 5\" (1.651 m)    Wt 140 lb 6.4 oz (63.7 kg)    LMP 10/30/2017    SpO2 97%    BMI 23.36 kg/m2       Physical Exam   Constitutional: She is oriented to person, place, and time. She appears well-developed and well-nourished. No distress. HENT:   Right Ear: External ear normal.   Left Ear: External ear normal.   Eyes: Conjunctivae and EOM are normal. Right eye exhibits no discharge. Left eye exhibits no discharge. Neck: Normal range of motion. Neck supple. Cardiovascular: Normal rate and regular rhythm. Pulmonary/Chest: Effort normal and breath sounds normal. She has no wheezes. Abdominal: Soft. Bowel sounds are normal. There is no tenderness. Genitourinary: Vaginal discharge found. Genitourinary Comments: Whitish discharge   Lymphadenopathy:     She has no cervical adenopathy. Neurological: She is alert and oriented to person, place, and time. Skin: She is not diaphoretic. Psychiatric: She has a normal mood and affect. Her behavior is normal.   Nursing note and vitals reviewed. ASSESSMENT and PLAN    ICD-10-CM ICD-9-CM    1. Insulin dependent diabetes mellitus type IA (HCC) E10.9 250.01 HEMOGLOBIN A1C WITH EAG      METABOLIC PANEL, COMPREHENSIVE      CBC W/O DIFF      LIPID PANEL      TSH 3RD GENERATION      VITAMIN D, 25 HYDROXY    Pt will return during lab hours to have basic fasting labs drawn. 2. Well woman exam with routine gynecological exam Z01.419 V72.31 PAP IG, RFX HPV ASCU, 55&23,75([id number])    Pap smear performed in office. Pt tolerated well. This plan was reviewed with the patient and patient agrees. All questions were answered.     This scribe documentation was reviewed by me and accurately reflects the examination and decisions made by me. This note will not be viewable in [address]

## 2017-11-25 NOTE — PROGRESS NOTES
Please let her know pap smear came back normal. She does have Bacterial Vaginosis if she is having symptoms then I will send Flagyl to her pharmacy.

## 2017-11-28 DIAGNOSIS — N76.0 BV (BACTERIAL VAGINOSIS): Primary | ICD-10-CM

## 2017-11-28 DIAGNOSIS — B96.89 BV (BACTERIAL VAGINOSIS): Primary | ICD-10-CM

## 2017-11-28 RX ORDER — METRONIDAZOLE 500 MG/1
500 TABLET ORAL 2 TIMES DAILY
Qty: 20 TAB | Refills: 0 | Status: SHIPPED | OUTPATIENT
Start: 2017-11-28 | End: 2017-12-08

## 2017-11-28 NOTE — PROGRESS NOTES
Spoke with pt and she stated she still having some symptoms so she would like the medication of choice sent to [facility]. I will place the pharmacy in her pharmacy list.  Do you need me to place the order for you?

## 2017-12-06 ENCOUNTER — OFFICE VISIT (OUTPATIENT)
Dept: INTERNAL MEDICINE CLINIC | Age: 27
End: 2017-12-06

## 2017-12-06 VITALS
HEART RATE: 76 BPM | WEIGHT: 144.6 LBS | OXYGEN SATURATION: 97 % | SYSTOLIC BLOOD PRESSURE: 92 MMHG | TEMPERATURE: 99.5 F | RESPIRATION RATE: 16 BRPM | DIASTOLIC BLOOD PRESSURE: 60 MMHG | BODY MASS INDEX: 24.09 KG/M2 | HEIGHT: 65 IN

## 2017-12-06 DIAGNOSIS — N76.0 BV (BACTERIAL VAGINOSIS): ICD-10-CM

## 2017-12-06 DIAGNOSIS — E10.9 INSULIN DEPENDENT DIABETES MELLITUS TYPE IA (HCC): ICD-10-CM

## 2017-12-06 DIAGNOSIS — S09.93XA INJURY OF FOREHEAD, INITIAL ENCOUNTER: Primary | ICD-10-CM

## 2017-12-06 DIAGNOSIS — B96.89 BV (BACTERIAL VAGINOSIS): ICD-10-CM

## 2017-12-06 NOTE — MR AVS SNAPSHOT
Visit Information Date & Time Provider Department Dept. Phone Encounter #  
 12/6/2017 11:45 AM Ronan Wakefield MD [facility] Internal Medicine [phone] [id number] Upcoming Health Maintenance Date Due MICROALBUMIN Q1 2/22/2018 EYE EXAM RETINAL OR DILATED Q1 2/26/2018 HEMOGLOBIN A1C Q6M 5/21/2018 FOOT EXAM Q1 7/26/2018 LIPID PANEL Q1 11/21/2018 PAP AKA CERVICAL CYTOLOGY 11/21/2020 DTaP/Tdap/Td series (2 - Td) 10/13/2025 Allergies as of 12/6/2017  Review Complete On: 12/6/2017 By: Ronan Wakefield MD  
 No Known Allergies Current Immunizations  Never Reviewed No immunizations on file. Not reviewed this visit You Were Diagnosed With   
  
 Codes Comments Injury of forehead, initial encounter    -  Primary ICD-10-CM: S09. [address] ICD-9-CM: 959.09 Insulin dependent diabetes mellitus type IA ([facility]     ICD-10-CM: E10.9 ICD-9-CM: 250.01   
 BV (bacterial vaginosis)     ICD-10-CM: N76.0, B96.89 
ICD-9-CM: 616.10, 041.9 Vitals BP Pulse Temp Resp Height(growth percentile) Weight(growth percentile) 92/60 (BP 1 Location: Right arm, BP Patient Position: Sitting) 76 99.5 °F (37.5 °C) (Oral) 16 5' 5\" (1.651 m) 144 lb 9.6 oz (65.6 kg) LMP SpO2 BMI OB Status Smoking Status 11/01/2017 97% 24.06 kg/m2 Having regular periods Current Every Day Smoker Vitals History BMI and BSA Data Body Mass Index Body Surface Area 24.06 kg/m 2 1.73 m 2 Preferred Pharmacy Pharmacy Name Phone [facility] [address] [facility], [address] [facility] [phone] Your Updated Medication List  
  
   
This list is accurate as of: 12/6/17 12:30 PM.  Always use your most recent med list.  
  
  
  
  
 aspirin 81 mg tablet Take 81 mg by mouth. Biotin 2,500 mcg Cap Take  by mouth.  
  
 glucagon 1 mg Kit  
1 mg by Injection route as needed. glucose blood VI test strips strip Commonly known as:  FREESTYLE TEST  
100 Each by Does Not Apply route four (4) times daily. insulin aspart 100 unit/mL Inpn Commonly known as:  Belkys Medin 7 Units by SubCUTAneous route three (3) times daily (with meals). insulin glargine 100 unit/mL (3 mL) Inpn Commonly known as:  LANTUS,BASAGLAR  
38 units daily Insulin Needles (Disposable) 31 gauge x 5/16\" Ndle  
by SubCUTAneous route three (3) times daily (with meals). Iron 160 mg (50 mg iron) Tber tablet Generic drug:  ferrous sulfate ER Take 1 Tab by mouth daily. Lancets Misc Commonly known as:  FREESTYLE LANCETS  
2 Packages by Does Not Apply route four (4) times daily. metroNIDAZOLE 500 mg tablet Commonly known as:  FLAGYL Take 1 Tab by mouth two (2) times a day for 10 days. mupirocin 2 % ointment Commonly known as:  [employer] Apply  to affected area daily. VITAMIN D2 PO Take  by mouth. Introducing [facility]! Dear Florencio Rivera: 
Thank you for requesting a [employer] account. Our records indicate that you already have an active [employer] account. You can access your account anytime at [email or web address] Did you know that you can access your hospital and ER discharge instructions at any time in [employer]? You can also review all of your test results from your hospital stay or ER visit. Additional Information If you have questions, please visit the Frequently Asked Questions section of the [employer] website at [email or web address] Remember, [employer] is NOT to be used for urgent needs. For medical emergencies, dial 911. Now available from your iPhone and Android! Please provide this summary of care documentation to your next provider. Your primary care clinician is listed as Narciso Real. If you have any questions after today's visit, please call [phone].

## 2017-12-06 NOTE — PROGRESS NOTES
Chief Complaint   Patient presents with   Simeon Brenden Other     was getting things out of the trunk of car and lid hit the forehead.  happened on monday

## 2017-12-06 NOTE — PROGRESS NOTES
Written by Ellwood Ganser, as dictated by Dr. Cy Casas MD.    Cher Graham is a 24 y.o. female. HPI  The patient comes in today as she dropped a car trunk door fell on her forehead. She does not feel very concerned about this but a family member recommended she have it checked out. She denies headaches. No LOC or confusion after the injury. She has not had her labs drawn yet, which were ordered at her last visit on 11/21. She also had a BV infection which was found on her last Pap smear, and Flagyl was sent to the pharmacy, which she has been taking. She says her BS have been improving, but she has been \"all over the place\" due to working 2 jobs and has not had time to focus on it. Patient Active Problem List   Diagnosis Code    Type I (juvenile type) diabetes mellitus without mention of complication, uncontrolled E10.65    Borderline personality disorder F60.3    Insulin dependent diabetes mellitus type IA ([facility] E10.9    Generalized anxiety disorder F41.1        Current Outpatient Prescriptions on File Prior to Visit   Medication Sig Dispense Refill    metroNIDAZOLE (FLAGYL) 500 mg tablet Take 1 Tab by mouth two (2) times a day for 10 days. 20 Tab 0    insulin glargine (LANTUS,BASAGLAR) 100 unit/mL (3 mL) inpn 38 units daily 3 Pen 1    insulin aspart (NOVOLOG FLEXPEN) 100 unit/mL inpn 7 Units by SubCUTAneous route three (3) times daily (with meals). 3 Pen 0    Insulin Needles, Disposable, 31 gauge x 5/16\" ndle by SubCUTAneous route three (3) times daily (with meals). 1 Package 11    Biotin 2,500 mcg cap Take  by mouth.  mupirocin (BACTROBAN) 2 % ointment Apply  to affected area daily. 22 g 0    ferrous sulfate ER (IRON) 160 mg (50 mg iron) TbER tablet Take 1 Tab by mouth daily.  ERGOCALCIFEROL, VITAMIN D2, (VITAMIN D PO) Take  by mouth.  aspirin 81 mg tablet Take 81 mg by mouth.       glucagon 1 mg Kit 1 mg by Injection route as needed. 1 Package 2    Lancets (FREESTYLE LANCETS) Misc 2 Packages by Does Not Apply route four (4) times daily. 100 Each 6    glucose blood VI test strips (FREESTYLE TEST) strip 100 Each by Does Not Apply route four (4) times daily. 2 Package 6     No current facility-administered medications on file prior to visit. Past Medical History:   Diagnosis Date    Diabetes mellitus        Family History   Problem Relation Age of Onset    Cancer Paternal Grandfather        Social History     Social History    Marital status: SINGLE     Spouse name: N/A    Number of children: N/A    Years of education: N/A     Occupational History    Not on file. Social History Main Topics    Smoking status: Current Every Day Smoker     Packs/day: 0.25    Smokeless tobacco: Never Used    Alcohol use No    Drug use: No    Sexual activity: Yes     Partners: Male     Other Topics Concern    Not on file     Social History Narrative       Review of Systems   Constitutional: Negative for malaise/fatigue. HENT: Negative for congestion. Respiratory: Negative for cough and shortness of breath. Musculoskeletal: Negative for joint pain and myalgias. Neurological: Negative for weakness and headaches. Visit Vitals    BP 92/60 (BP 1 Location: Right arm, BP Patient Position: Sitting)    Pulse 76    Temp 99.5 °F (37.5 °C) (Oral)    Resp 16    Ht 5' 5\" (1.651 m)    Wt 144 lb 9.6 oz (65.6 kg)    LMP 11/01/2017    SpO2 97%    BMI 24.06 kg/m2       Physical Exam   Constitutional: She is oriented to person, place, and time. She appears well-developed and well-nourished. No distress. HENT:   Right Ear: External ear normal.   Left Ear: External ear normal.   Eyes: Conjunctivae and EOM are normal. Right eye exhibits no discharge. Left eye exhibits no discharge. Neck: Normal range of motion. Neck supple. Cardiovascular: Normal rate and regular rhythm.     Pulmonary/Chest: Effort normal and breath sounds normal. She has no wheezes. Abdominal: Soft. Bowel sounds are normal. There is no tenderness. Musculoskeletal: She exhibits tenderness. Tender spot on forehead   Lymphadenopathy:     She has no cervical adenopathy. Neurological: She is alert and oriented to person, place, and time. Skin: She is not diaphoretic. Psychiatric: She has a normal mood and affect. Her behavior is normal.   Nursing note and vitals reviewed. ASSESSMENT and PLAN    ICD-10-CM ICD-9-CM    1. Injury of forehead, initial encounter S09. 93XA 959.09 If she is not having any sxs after a few days (headache, pressure behind eyes) she is fine. 2. Insulin dependent diabetes mellitus type IA ([facility] E10.9 250.01 She has been monitoring her BS at home. 3. BV (bacterial vaginosis) N76.0 616.10 Still taking Flagyl. B96.89 041. 9         This plan was reviewed with the patient and patient agrees. All questions were answered. This scribe documentation was reviewed by me and accurately reflects the examination and decisions made by me. This note will not be viewable in [address]

## 2017-12-14 DIAGNOSIS — B96.89 BV (BACTERIAL VAGINOSIS): Primary | ICD-10-CM

## 2017-12-14 DIAGNOSIS — N76.0 BV (BACTERIAL VAGINOSIS): Primary | ICD-10-CM

## 2017-12-14 RX ORDER — TINIDAZOLE 500 MG/1
2 TABLET ORAL
Qty: 5 TAB | Refills: 0 | Status: SHIPPED | OUTPATIENT
Start: 2017-12-14 | End: 2017-12-19

## 2017-12-18 ENCOUNTER — OFFICE VISIT (OUTPATIENT)
Dept: INTERNAL MEDICINE CLINIC | Age: 27
End: 2017-12-18

## 2017-12-18 VITALS
SYSTOLIC BLOOD PRESSURE: 110 MMHG | TEMPERATURE: 98.8 F | RESPIRATION RATE: 16 BRPM | WEIGHT: 142.4 LBS | BODY MASS INDEX: 23.72 KG/M2 | HEART RATE: 83 BPM | OXYGEN SATURATION: 97 % | DIASTOLIC BLOOD PRESSURE: 60 MMHG | HEIGHT: 65 IN

## 2017-12-18 DIAGNOSIS — L08.9 SKIN INFECTION, BACTERIAL: Primary | ICD-10-CM

## 2017-12-18 DIAGNOSIS — L81.8 HISTORY OF BEING TATOOED: ICD-10-CM

## 2017-12-18 DIAGNOSIS — B96.89 SKIN INFECTION, BACTERIAL: Primary | ICD-10-CM

## 2017-12-18 DIAGNOSIS — Z34.90 PREGNANCY, UNSPECIFIED GESTATIONAL AGE: ICD-10-CM

## 2017-12-18 RX ORDER — CEPHALEXIN 500 MG/1
500 CAPSULE ORAL 3 TIMES DAILY
Qty: 30 CAP | Refills: 0 | Status: SHIPPED | OUTPATIENT
Start: 2017-12-18 | End: 2018-01-09 | Stop reason: ALTCHOICE

## 2017-12-18 NOTE — PROGRESS NOTES
Chief Complaint   Patient presents with    Other     Has tattoo that is infected, and it seems to be spreading up the arm. also is making us aware that she is pregnant.

## 2017-12-18 NOTE — LETTER
NOTIFICATION RETURN TO WORK / SCHOOL 
 
12/18/2017 11:52 AM 
 
Ms. Richards Or [address] [place] [id number] To Whom It May Concern: 
 
Maurice Or is currently under the care of [address] [place] [address]. She will return to work/school on: 12/19/17. If there are questions or concerns please have the patient contact our office.  
 
 
 
Sincerely, 
 
 
Alis De León NP

## 2017-12-18 NOTE — PROGRESS NOTES
This note will not be viewable in [address] Rachel Abad is a  24 y.o. female presents for visit. Infected tatoo. Chief Complaint   Patient presents with    Other     Has tattoo that is infected, and it seems to be spreading up the arm. also is making us aware that she is pregnant. HPI   Established patient new to me. Left forearm tatoo was done on 12/8/17. About 2-4 days afterwards noticed scattered round erythematous pustules on left arm and 1 on chest and 1 on right arm. Denies fever/chills. Similar infection in April after tatoo that cleared with Bactrim. Tenderness reported on 2 pustules. Patient states she is pregnant by home urine pregnacy test 2 weeks ago. Repeated test again 2 days ago and tested positive again. Reports she is not keeping the [facility]. Boyfriend involved and has supportive friends. ROS   See HPI for pertinent positives and negatives. Visit Vitals    /60 (BP 1 Location: Left arm, BP Patient Position: Sitting)    Pulse 83    Temp 98.8 °F (37.1 °C) (Oral)    Resp 16    Ht 5' 5\" (1.651 m)    Wt 142 lb 6.4 oz (64.6 kg)    LMP 11/01/2017    SpO2 97%    BMI 23.7 kg/m2     Physical Exam   Constitutional: She is oriented to person, place, and time. No distress. HENT:   Head: Normocephalic and atraumatic. Eyes: Conjunctivae are normal.   Cardiovascular: Normal rate, regular rhythm and normal heart sounds. Pulmonary/Chest: Effort normal and breath sounds normal.   Neurological: She is alert and oriented to person, place, and time. Skin: Skin is warm and dry. Rash noted. Scattered pustules noted to left UE   Psychiatric: She has a normal mood and affect. Her behavior is normal.   Nursing note and vitals reviewed. No results found for this or any previous visit (from the past 24 hour(s)).     Patient Active Problem List    Diagnosis Date Noted    Borderline personality disorder 02/14/2017    Insulin dependent diabetes mellitus type IA ([facility] 02/14/2017    Generalized anxiety disorder 02/14/2017    Type I (juvenile type) diabetes mellitus without mention of complication, uncontrolled 10/11/2011         ASSESSMENT AND PLAN:      ICD-10-CM ICD-9-CM   1. Skin infection, bacterial L08.9 686.9    B96.89 041.9   2. History of being tatooed L81.8 V49.89   3. Pregnancy, unspecified gestational age Z27.80 V22.2     Orders Placed This Encounter    cephALEXin (KEFLEX) 500 mg capsule     Sig: Take 1 Cap by mouth three (3) times daily. Indications: Skin and Skin Structure Infection     Dispense:  30 Cap     Refill:  0     Diagnoses and all orders for this visit:    1. Skin infection, bacterial  -     cephALEXin (KEFLEX) 500 mg capsule; Take 1 Cap by mouth three (3) times daily. Indications: Skin and Skin Structure Infection-safety monitoring reviewed and clinical summary reports may use in pregnancy with low risk of teratogenicity. 2. History of being tatooed--    3. Pregnancy, unspecified gestational age-[facility] Does not plan to continue pregnancy. Educated patient of side-effects of tinazadole which was previously prescribed with reccomendation to avoid in pregnancy. Patient stated she wants to continue this medication after bring  Informed. Follow-up Disposition:  Return if symptoms worsen or fail to improve, for f/u with Dr. Cisco Gardner for chronic care management. Disclaimer:  Advised her to call back or return to office if symptoms worsen/change/persist.  Discussed expected course/resolution/complications of diagnosis in detail with patient. Medication risks/benefits/alternatives discussed with patient. She was given an after visit summary which includes diagnoses, current medications, & vitals. Discussed patient instructions and advised to read to all patient instructions regarding care.      She expressed understanding with the diagnosis and plan.

## 2018-01-09 ENCOUNTER — OFFICE VISIT (OUTPATIENT)
Dept: INTERNAL MEDICINE CLINIC | Age: 28
End: 2018-01-09

## 2018-01-09 VITALS
RESPIRATION RATE: 20 BRPM | OXYGEN SATURATION: 99 % | HEIGHT: 65 IN | DIASTOLIC BLOOD PRESSURE: 62 MMHG | BODY MASS INDEX: 23.66 KG/M2 | WEIGHT: 142 LBS | SYSTOLIC BLOOD PRESSURE: 110 MMHG | HEART RATE: 68 BPM | TEMPERATURE: 97.2 F

## 2018-01-09 DIAGNOSIS — E10.9 INSULIN DEPENDENT DIABETES MELLITUS TYPE IA (HCC): Primary | ICD-10-CM

## 2018-01-09 DIAGNOSIS — N89.8 VAGINAL DISCHARGE: ICD-10-CM

## 2018-01-09 DIAGNOSIS — O03.9 ABORTION: ICD-10-CM

## 2018-01-09 NOTE — MR AVS SNAPSHOT
Visit Information Date & Time Provider Department Dept. Phone Encounter #  
 2018 11:45 AM Dane Yeboah MD [facility] Internal Medicine [phone] [id number] Upcoming Health Maintenance Date Due MICROALBUMIN Q1 2018 EYE EXAM RETINAL OR DILATED Q1 2018 HEMOGLOBIN A1C Q6M 2018 FOOT EXAM Q1 2018 LIPID PANEL Q1 2018 PAP AKA CERVICAL CYTOLOGY 2020 DTaP/Tdap/Td series (2 - Td) 10/13/2025 Allergies as of 2018  Review Complete On: 2018 By: Dane Yeboah MD  
 No Known Allergies Current Immunizations  Never Reviewed No immunizations on file. Not reviewed this visit You Were Diagnosed With   
  
 Codes Comments Insulin dependent diabetes mellitus type IA ([facility]    -  Primary ICD-10-CM: E10.9 ICD-9-CM: 250.01      ICD-10-CM: O03.9 ICD-9-CM: 637.90 Vaginal discharge     ICD-10-CM: N89.8 ICD-9-CM: 623.5 Vitals BP Pulse Temp Resp Height(growth percentile) Weight(growth percentile) 110/62 68 97.2 °F (36.2 °C) (Oral) 20 5' 5\" (1.651 m) 142 lb (64.4 kg) LMP SpO2 Breastfeeding? BMI OB Status Smoking Status 2017 99% No 23.63 kg/m2 Having regular periods Former Smoker BMI and BSA Data Body Mass Index Body Surface Area  
 23.63 kg/m 2 1.72 m 2 Preferred Pharmacy Pharmacy Name Phone [facility] [address] [place] [address], [address] [place] [address],[facility] Floor [phone] Your Updated Medication List  
  
   
This list is accurate as of: 18 12:47 PM.  Always use your most recent med list.  
  
  
  
  
 aspirin 81 mg tablet Take 81 mg by mouth. Biotin 2,500 mcg Cap Take  by mouth.  
  
 glucagon 1 mg Kit  
1 mg by Injection route as needed. glucose blood VI test strips strip Commonly known as:  FREESTYLE TEST  
100 Each by Does Not Apply route four (4) times daily. insulin aspart 100 unit/mL Inpn Commonly known as:  Ras Malagonnaty 7 Units by SubCUTAneous route three (3) times daily (with meals). insulin glargine 100 unit/mL (3 mL) Inpn Commonly known as:  LANTUS,BASAGLAR  
38 units daily Insulin Needles (Disposable) 31 gauge x 5/16\" Ndle  
by SubCUTAneous route three (3) times daily (with meals). Iron 160 mg (50 mg iron) Tber tablet Generic drug:  ferrous sulfate ER Take 1 Tab by mouth daily. Lancets Misc Commonly known as:  FREESTYLE LANCETS  
2 Packages by Does Not Apply route four (4) times daily. mupirocin 2 % ointment Commonly known as:  [employer] Apply  to affected area daily. VITAMIN D2 PO Take  by mouth. We Performed the Following BACTERIAL VAGINOSIS, ANITA [[id number] Custom] CT/NG/T.VAGINALIS AMPLIFICATION [id number] CPT(R)] Introducing [facility] SERVICES! Dear Andree Merlin: 
Thank you for requesting a [employer] account. Our records indicate that you already have an active [employer] account. You can access your account anytime at [email or web address]. [employer] Did you know that you can access your hospital and ER discharge instructions at any time in [employer]? You can also review all of your test results from your hospital stay or ER visit. Additional Information If you have questions, please visit the Frequently Asked Questions section of the [employer] website at [email or web address] [email or web address]/. Remember, [employer] is NOT to be used for urgent needs. For medical emergencies, dial 911. Now available from your iPhone and Android! Please provide this summary of care documentation to your next provider. Your primary care clinician is listed as Kt Elliott. If you have any questions after today's visit, please call [phone].

## 2018-01-09 NOTE — PROGRESS NOTES
Written by [facility], as dictated by Dr. Dot Almodovar MD.    Cleve Hernández is a 24 y.o. female. HPI  The patient comes in today for a follow-up. Her BS have been coming down: it was 125 after dinner yesterday. She had an  on  at 6 weeks of gestation at [facility]. She has a follow-up appointment next week with them. She would like to start on an oral contraceptive, which she has discussed with her provider at [facility]. She is still experiencing vaginal itching & discharge . She has been given Flagyl for this in the past which she took, but her sxs have not resolved. Patient Active Problem List   Diagnosis Code    Type I (juvenile type) diabetes mellitus without mention of complication, uncontrolled E10.65    Borderline personality disorder F60.3    Insulin dependent diabetes mellitus type IA ([facility] E10.9    Generalized anxiety disorder F41.1        Current Outpatient Prescriptions on File Prior to Visit   Medication Sig Dispense Refill    insulin glargine (LANTUS,BASAGLAR) 100 unit/mL (3 mL) inpn 38 units daily 3 Pen 1    insulin aspart (NOVOLOG FLEXPEN) 100 unit/mL inpn 7 Units by SubCUTAneous route three (3) times daily (with meals). 3 Pen 0    Insulin Needles, Disposable, 31 gauge x 5/16\" ndle by SubCUTAneous route three (3) times daily (with meals). 1 Package 11    Biotin 2,500 mcg cap Take  by mouth.  mupirocin (BACTROBAN) 2 % ointment Apply  to affected area daily. 22 g 0    ferrous sulfate ER (IRON) 160 mg (50 mg iron) TbER tablet Take 1 Tab by mouth daily.  ERGOCALCIFEROL, VITAMIN D2, (VITAMIN D PO) Take  by mouth.  aspirin 81 mg tablet Take 81 mg by mouth.  glucagon 1 mg Kit 1 mg by Injection route as needed. 1 Package 2    Lancets (FREESTYLE LANCETS) Misc 2 Packages by Does Not Apply route four (4) times daily.  100 Each 6    glucose blood VI test strips (FREESTYLE TEST) strip 100 Each by Does Not Apply route four (4) times daily. 2 Package 6     No current facility-administered medications on file prior to visit. Past Medical History:   Diagnosis Date    Diabetes mellitus        Family History   Problem Relation Age of Onset    Cancer Paternal Grandfather        Social History     Social History    Marital status: SINGLE     Spouse name: N/A    Number of children: N/A    Years of education: N/A     Occupational History    Not on file. Social History Main Topics    Smoking status: Former Smoker     Packs/day: 0.00    Smokeless tobacco: Never Used      Comment: quit date 12/8/17    Alcohol use No    Drug use: No    Sexual activity: Yes     Partners: Male     Other Topics Concern    Not on file     Social History Narrative       Review of Systems   Constitutional: Negative for malaise/fatigue. Cardiovascular: Negative for chest pain and palpitations. Gastrointestinal: Negative for abdominal pain and heartburn. Genitourinary: Negative for frequency and urgency. Musculoskeletal: Negative for joint pain and myalgias. Neurological: Negative for dizziness, tingling, sensory change and weakness. Psychiatric/Behavioral: Negative for depression, memory loss and substance abuse. Visit Vitals    /62    Pulse 68    Temp 97.2 °F (36.2 °C) (Oral)    Resp 20    Ht 5' 5\" (1.651 m)    Wt 142 lb (64.4 kg)    LMP 11/01/2017    SpO2 99%    Breastfeeding No    BMI 23.63 kg/m2       Physical Exam   Constitutional: She is oriented to person, place, and time. She appears well-developed and well-nourished. HENT:   Right Ear: External ear normal.   Left Ear: External ear normal.   Eyes: Conjunctivae and EOM are normal.   Neck: Normal range of motion. Neck supple. Cardiovascular: Normal rate and regular rhythm. Pulmonary/Chest: Effort normal and breath sounds normal.   Abdominal: Soft. Bowel sounds are normal.   Genitourinary: Vaginal discharge found.    Neurological: She is alert and oriented to person, place, and time. Psychiatric: She has a normal mood and affect. Nursing note and vitals reviewed. ASSESSMENT and PLAN    ICD-10-CM ICD-9-CM    1. Insulin dependent diabetes mellitus type IA (HCC) E10.9 250.01 BS are under control with current medication. 2.  O03.9 637.90 [facility] at [facility]. Bleeding has stopped. 3. Vaginal discharge N89.8 623.5       CT/NG/T.VAGINALIS AMPLIFICATION  BACTERIAL VAGINOSIS, ANITA  Discussed that she would need [facility] before starting her on an oral contraceptive, as she needs to ensure she no longer has any remaining products of conception, as they could cause infection if she starts on a contraceptive. Will also check her for other STIs and adjust her treatment accordingly if anything else is positive. This plan was reviewed with the patient and patient agrees. All questions were answered. This scribe documentation was reviewed by me and accurately reflects the examination and decisions made by me. This note will not be viewable in [address]

## 2018-01-10 LAB
C TRACH RRNA SPEC QL NAA+PROBE: NEGATIVE
N GONORRHOEA RRNA SPEC QL NAA+PROBE: NEGATIVE
T VAGINALIS RRNA SPEC QL NAA+PROBE: NEGATIVE

## 2018-01-11 DIAGNOSIS — E10.9 INSULIN DEPENDENT DIABETES MELLITUS TYPE IA (HCC): ICD-10-CM

## 2018-01-11 LAB
A VAGINAE DNA VAG QL NAA+PROBE: ABNORMAL SCORE
BVAB2 DNA VAG QL NAA+PROBE: ABNORMAL SCORE
MEGA1 DNA VAG QL NAA+PROBE: ABNORMAL SCORE

## 2018-01-11 RX ORDER — INSULIN GLARGINE 100 [IU]/ML
INJECTION, SOLUTION SUBCUTANEOUS
Qty: 3 PEN | Refills: 1 | Status: ON HOLD | OUTPATIENT
Start: 2018-01-11 | End: 2018-03-05

## 2018-01-11 NOTE — TELEPHONE ENCOUNTER
From: Faisal Paulson  To: Peri Gandhi MD  Sent: 1/11/2018 9:44 AM EST  Subject: Medication Renewal Request    Original authorizing provider: MD Litzy Moody.  Ney Toya would like a refill of the following medications:  insulin glargine (LANTUS,BASAGLAR) 100 unit/mL (3 mL) inpn Peri Gandhi MD]    Preferred pharmacy: [address] [place] [address]    Comment:

## 2018-01-12 DIAGNOSIS — B96.89 BV (BACTERIAL VAGINOSIS): ICD-10-CM

## 2018-01-12 DIAGNOSIS — N76.0 BV (BACTERIAL VAGINOSIS): ICD-10-CM

## 2018-01-12 NOTE — PROGRESS NOTES
Julieta, your Bacterial vaginosis result came back indeterminate. If Diflucan did not help then I will send Flagyl to the pharmacy. You should take it for 10 days.

## 2018-01-14 DIAGNOSIS — B96.89 BV (BACTERIAL VAGINOSIS): Primary | ICD-10-CM

## 2018-01-14 DIAGNOSIS — N76.0 BV (BACTERIAL VAGINOSIS): Primary | ICD-10-CM

## 2018-01-14 RX ORDER — METRONIDAZOLE 500 MG/1
500 TABLET ORAL 2 TIMES DAILY
Qty: 20 TAB | Refills: 0 | Status: SHIPPED | OUTPATIENT
Start: 2018-01-14 | End: 2018-01-24

## 2018-01-15 DIAGNOSIS — E10.9 TYPE 1 DIABETES MELLITUS WITHOUT COMPLICATION (HCC): ICD-10-CM

## 2018-01-15 DIAGNOSIS — E16.2 HYPOGLYCEMIA: ICD-10-CM

## 2018-01-15 RX ORDER — PEN NEEDLE, DIABETIC 30 GX3/16"
NEEDLE, DISPOSABLE MISCELLANEOUS
Qty: 1 PACKAGE | Refills: 11 | Status: SHIPPED | OUTPATIENT
Start: 2018-01-15 | End: 2018-02-28

## 2018-01-15 RX ORDER — INSULIN ASPART 100 [IU]/ML
7 INJECTION, SOLUTION INTRAVENOUS; SUBCUTANEOUS
Qty: 3 PEN | Refills: 2 | Status: SHIPPED | OUTPATIENT
Start: 2018-01-15 | End: 2018-02-28

## 2018-01-15 NOTE — TELEPHONE ENCOUNTER
From: Mills Barthel  To: Piedad Meadows MD  Sent: 1/15/2018 10:23 AM EST  Subject: Medication Renewal Request    Original authorizing provider: MD Jonnie Arias.  Yonatan St would like a refill of the following medications:  insulin aspart (NOVOLOG FLEXPEN) 100 unit/mL inpn Piedad Meadows MD]  Insulin Needles, Disposable, 31 gauge x 5/16\" ndle Piedad Meadows MD]    Preferred pharmacy: [address] [place] [address]    Comment:

## 2018-01-18 ENCOUNTER — DOCUMENTATION ONLY (OUTPATIENT)
Dept: INTERNAL MEDICINE CLINIC | Age: 28
End: 2018-01-18

## 2018-01-22 ENCOUNTER — DOCUMENTATION ONLY (OUTPATIENT)
Dept: INTERNAL MEDICINE CLINIC | Age: 28
End: 2018-01-22

## 2018-01-24 ENCOUNTER — DOCUMENTATION ONLY (OUTPATIENT)
Dept: INTERNAL MEDICINE CLINIC | Age: 28
End: 2018-01-24

## 2018-02-27 ENCOUNTER — DOCUMENTATION ONLY (OUTPATIENT)
Dept: INTERNAL MEDICINE CLINIC | Age: 28
End: 2018-02-27

## 2018-02-27 NOTE — PROGRESS NOTES
Prior auth for novolog flex pen is unknown.   Prior [facility] is having difficulties verifying insurance

## 2018-02-28 ENCOUNTER — APPOINTMENT (OUTPATIENT)
Dept: ULTRASOUND IMAGING | Age: 28
DRG: 871 | End: 2018-02-28
Attending: NURSE PRACTITIONER
Payer: COMMERCIAL

## 2018-02-28 ENCOUNTER — HOSPITAL ENCOUNTER (INPATIENT)
Age: 28
LOS: 5 days | Discharge: HOME HEALTH | DRG: 871 | End: 2018-03-05
Attending: EMERGENCY MEDICINE | Admitting: FAMILY MEDICINE
Payer: COMMERCIAL

## 2018-02-28 ENCOUNTER — APPOINTMENT (OUTPATIENT)
Dept: GENERAL RADIOLOGY | Age: 28
DRG: 871 | End: 2018-02-28
Attending: FAMILY MEDICINE
Payer: COMMERCIAL

## 2018-02-28 DIAGNOSIS — E10.9 INSULIN DEPENDENT DIABETES MELLITUS TYPE IA (HCC): ICD-10-CM

## 2018-02-28 DIAGNOSIS — E13.10 DIABETIC KETOACIDOSIS WITHOUT COMA ASSOCIATED WITH OTHER SPECIFIED DIABETES MELLITUS (HCC): Primary | ICD-10-CM

## 2018-02-28 PROBLEM — E11.10 DKA (DIABETIC KETOACIDOSES): Status: ACTIVE | Noted: 2018-02-28

## 2018-02-28 LAB
ADMINISTERED INITIALS, ADMINIT: NORMAL
ALBUMIN SERPL-MCNC: 3.8 G/DL (ref 3.5–5)
ALBUMIN/GLOB SERPL: 0.7 {RATIO} (ref 1.1–2.2)
ALP SERPL-CCNC: 205 U/L (ref 45–117)
ALT SERPL-CCNC: 23 U/L (ref 12–78)
ANION GAP SERPL CALC-SCNC: 23 MMOL/L (ref 5–15)
APPEARANCE UR: CLEAR
ARTERIAL PATENCY WRIST A: ABNORMAL
AST SERPL-CCNC: 18 U/L (ref 15–37)
BACTERIA URNS QL MICRO: NEGATIVE /HPF
BASE DEFICIT BLDV-SCNC: 22 MMOL/L
BASOPHILS # BLD: 0 K/UL (ref 0–0.1)
BASOPHILS NFR BLD: 0 % (ref 0–1)
BDY SITE: ABNORMAL
BILIRUB SERPL-MCNC: 0.4 MG/DL (ref 0.2–1)
BILIRUB UR QL: NEGATIVE
BUN SERPL-MCNC: 18 MG/DL (ref 6–20)
BUN/CREAT SERPL: 15 (ref 12–20)
CALCIUM SERPL-MCNC: 9.8 MG/DL (ref 8.5–10.1)
CHLORIDE SERPL-SCNC: 99 MMOL/L (ref 97–108)
CO2 SERPL-SCNC: 9 MMOL/L (ref 21–32)
COLOR UR: ABNORMAL
CREAT SERPL-MCNC: 1.18 MG/DL (ref 0.55–1.02)
D50 ADMINISTERED, D50ADM: 0 ML
D50 ORDER, D50ORD: 0 ML
DIFFERENTIAL METHOD BLD: ABNORMAL
EOSINOPHIL # BLD: 0 K/UL (ref 0–0.4)
EOSINOPHIL NFR BLD: 0 % (ref 0–7)
EPITH CASTS URNS QL MICRO: ABNORMAL /LPF
ERYTHROCYTE [DISTWIDTH] IN BLOOD BY AUTOMATED COUNT: 12.1 % (ref 11.5–14.5)
EST. AVERAGE GLUCOSE BLD GHB EST-MCNC: 278 MG/DL
GAS FLOW.O2 O2 DELIVERY SYS: ABNORMAL L/MIN
GLOBULIN SER CALC-MCNC: 5.7 G/DL (ref 2–4)
GLSCOM COMMENTS: NORMAL
GLUCOSE BLD STRIP.AUTO-MCNC: 187 MG/DL (ref 65–100)
GLUCOSE BLD STRIP.AUTO-MCNC: 247 MG/DL (ref 65–100)
GLUCOSE BLD STRIP.AUTO-MCNC: 372 MG/DL (ref 65–100)
GLUCOSE BLD STRIP.AUTO-MCNC: 454 MG/DL (ref 65–100)
GLUCOSE SERPL-MCNC: 487 MG/DL (ref 65–100)
GLUCOSE UR STRIP.AUTO-MCNC: >1000 MG/DL
GLUCOSE, GLC: 187 MG/DL
GLUCOSE, GLC: 247 MG/DL
GLUCOSE, GLC: 372 MG/DL
GLUCOSE, GLC: 454 MG/DL
HBA1C MFR BLD: 11.3 % (ref 4.2–6.3)
HCG UR QL: NEGATIVE
HCO3 BLDV-SCNC: 7.7 MMOL/L (ref 23–28)
HCT VFR BLD AUTO: 49.6 % (ref 35–47)
HGB BLD-MCNC: 16.5 G/DL (ref 11.5–16)
HGB UR QL STRIP: ABNORMAL
HIGH TARGET, HITG: 250 MG/DL
IMM GRANULOCYTES # BLD: 0.2 K/UL (ref 0–0.04)
IMM GRANULOCYTES NFR BLD AUTO: 1 % (ref 0–0.5)
INSULIN ADMINSTERED, INSADM: 3.8 UNITS/HOUR
INSULIN ADMINSTERED, INSADM: 5.6 UNITS/HOUR
INSULIN ADMINSTERED, INSADM: 7.9 UNITS/HOUR
INSULIN ADMINSTERED, INSADM: 9.4 UNITS/HOUR
INSULIN ORDER, INSORD: 3.8 UNITS/HOUR
INSULIN ORDER, INSORD: 5.6 UNITS/HOUR
INSULIN ORDER, INSORD: 7.9 UNITS/HOUR
INSULIN ORDER, INSORD: 9.4 UNITS/HOUR
KETONES UR QL STRIP.AUTO: >80 MG/DL
LEUKOCYTE ESTERASE UR QL STRIP.AUTO: NEGATIVE
LIPASE SERPL-CCNC: 48 U/L (ref 73–393)
LOW TARGET, LOT: 150 MG/DL
LYMPHOCYTES # BLD: 0.6 K/UL (ref 0.8–3.5)
LYMPHOCYTES NFR BLD: 3 % (ref 12–49)
MCH RBC QN AUTO: 33.3 PG (ref 26–34)
MCHC RBC AUTO-ENTMCNC: 33.3 G/DL (ref 30–36.5)
MCV RBC AUTO: 100.2 FL (ref 80–99)
MINUTES UNTIL NEXT BG, NBG: 60 MIN
MONOCYTES # BLD: 0.8 K/UL (ref 0–1)
MONOCYTES NFR BLD: 4 % (ref 5–13)
MULTIPLIER, MUL: 0.02
MULTIPLIER, MUL: 0.03
NEUTS SEG # BLD: 19.2 K/UL (ref 1.8–8)
NEUTS SEG NFR BLD: 92 % (ref 32–75)
NITRITE UR QL STRIP.AUTO: NEGATIVE
NRBC # BLD: 0 K/UL (ref 0–0.01)
NRBC BLD-RTO: 0 PER 100 WBC
ORDER INITIALS, ORDINIT: NORMAL
PCO2 BLDV: 24.7 MMHG (ref 41–51)
PH BLDV: 7.1 [PH] (ref 7.32–7.42)
PH UR STRIP: 5 [PH] (ref 5–8)
PLATELET # BLD AUTO: 298 K/UL (ref 150–400)
PMV BLD AUTO: 10 FL (ref 8.9–12.9)
PO2 BLDV: 27 MMHG (ref 25–40)
POTASSIUM SERPL-SCNC: 5 MMOL/L (ref 3.5–5.1)
PROT SERPL-MCNC: 9.5 G/DL (ref 6.4–8.2)
PROT UR STRIP-MCNC: 100 MG/DL
RBC # BLD AUTO: 4.95 M/UL (ref 3.8–5.2)
RBC #/AREA URNS HPF: ABNORMAL /HPF (ref 0–5)
RBC MORPH BLD: ABNORMAL
SAO2 % BLDV: 34 % (ref 65–88)
SERVICE CMNT-IMP: ABNORMAL
SODIUM SERPL-SCNC: 131 MMOL/L (ref 136–145)
SP GR UR REFRACTOMETRY: 1.03 (ref 1–1.03)
SPECIMEN TYPE: ABNORMAL
TOTAL RESP. RATE, ITRR: 18
UR CULT HOLD, URHOLD: NORMAL
UROBILINOGEN UR QL STRIP.AUTO: 0.2 EU/DL (ref 0.2–1)
WBC # BLD AUTO: 20.8 K/UL (ref 3.6–11)
WBC URNS QL MICRO: ABNORMAL /HPF (ref 0–4)

## 2018-02-28 PROCEDURE — 84100 ASSAY OF PHOSPHORUS: CPT | Performed by: FAMILY MEDICINE

## 2018-02-28 PROCEDURE — 87633 RESP VIRUS 12-25 TARGETS: CPT | Performed by: FAMILY MEDICINE

## 2018-02-28 PROCEDURE — 87086 URINE CULTURE/COLONY COUNT: CPT | Performed by: INTERNAL MEDICINE

## 2018-02-28 PROCEDURE — 87040 BLOOD CULTURE FOR BACTERIA: CPT | Performed by: FAMILY MEDICINE

## 2018-02-28 PROCEDURE — 81001 URINALYSIS AUTO W/SCOPE: CPT | Performed by: NURSE PRACTITIONER

## 2018-02-28 PROCEDURE — 74011000258 HC RX REV CODE- 258: Performed by: EMERGENCY MEDICINE

## 2018-02-28 PROCEDURE — 71045 X-RAY EXAM CHEST 1 VIEW: CPT

## 2018-02-28 PROCEDURE — 83036 HEMOGLOBIN GLYCOSYLATED A1C: CPT | Performed by: EMERGENCY MEDICINE

## 2018-02-28 PROCEDURE — 85025 COMPLETE CBC W/AUTO DIFF WBC: CPT | Performed by: NURSE PRACTITIONER

## 2018-02-28 PROCEDURE — 96365 THER/PROPH/DIAG IV INF INIT: CPT

## 2018-02-28 PROCEDURE — 74011636637 HC RX REV CODE- 636/637: Performed by: EMERGENCY MEDICINE

## 2018-02-28 PROCEDURE — 83036 HEMOGLOBIN GLYCOSYLATED A1C: CPT | Performed by: FAMILY MEDICINE

## 2018-02-28 PROCEDURE — 74011250636 HC RX REV CODE- 250/636: Performed by: EMERGENCY MEDICINE

## 2018-02-28 PROCEDURE — 87186 SC STD MICRODIL/AGAR DIL: CPT | Performed by: FAMILY MEDICINE

## 2018-02-28 PROCEDURE — 83735 ASSAY OF MAGNESIUM: CPT | Performed by: FAMILY MEDICINE

## 2018-02-28 PROCEDURE — 74011250636 HC RX REV CODE- 250/636: Performed by: FAMILY MEDICINE

## 2018-02-28 PROCEDURE — 74011250637 HC RX REV CODE- 250/637: Performed by: EMERGENCY MEDICINE

## 2018-02-28 PROCEDURE — 81025 URINE PREGNANCY TEST: CPT

## 2018-02-28 PROCEDURE — 74011250637 HC RX REV CODE- 250/637: Performed by: FAMILY MEDICINE

## 2018-02-28 PROCEDURE — 74011636637 HC RX REV CODE- 636/637: Performed by: FAMILY MEDICINE

## 2018-02-28 PROCEDURE — 74011000258 HC RX REV CODE- 258: Performed by: FAMILY MEDICINE

## 2018-02-28 PROCEDURE — 83690 ASSAY OF LIPASE: CPT | Performed by: NURSE PRACTITIONER

## 2018-02-28 PROCEDURE — 76705 ECHO EXAM OF ABDOMEN: CPT

## 2018-02-28 PROCEDURE — 99285 EMERGENCY DEPT VISIT HI MDM: CPT

## 2018-02-28 PROCEDURE — 80048 BASIC METABOLIC PNL TOTAL CA: CPT | Performed by: FAMILY MEDICINE

## 2018-02-28 PROCEDURE — 74011000250 HC RX REV CODE- 250: Performed by: EMERGENCY MEDICINE

## 2018-02-28 PROCEDURE — 94762 N-INVAS EAR/PLS OXIMTRY CONT: CPT

## 2018-02-28 PROCEDURE — 36415 COLL VENOUS BLD VENIPUNCTURE: CPT | Performed by: FAMILY MEDICINE

## 2018-02-28 PROCEDURE — 87077 CULTURE AEROBIC IDENTIFY: CPT | Performed by: FAMILY MEDICINE

## 2018-02-28 PROCEDURE — 65660000000 HC RM CCU STEPDOWN

## 2018-02-28 PROCEDURE — 96375 TX/PRO/DX INJ NEW DRUG ADDON: CPT

## 2018-02-28 PROCEDURE — 80053 COMPREHEN METABOLIC PANEL: CPT | Performed by: NURSE PRACTITIONER

## 2018-02-28 PROCEDURE — 82962 GLUCOSE BLOOD TEST: CPT

## 2018-02-28 PROCEDURE — 74011250637 HC RX REV CODE- 250/637: Performed by: INTERNAL MEDICINE

## 2018-02-28 PROCEDURE — 82803 BLOOD GASES ANY COMBINATION: CPT

## 2018-02-28 RX ORDER — ONDANSETRON 2 MG/ML
4 INJECTION INTRAMUSCULAR; INTRAVENOUS
Status: DISCONTINUED | OUTPATIENT
Start: 2018-02-28 | End: 2018-03-05 | Stop reason: HOSPADM

## 2018-02-28 RX ORDER — DIPHENHYDRAMINE HCL 25 MG
50 CAPSULE ORAL
COMMUNITY
End: 2018-03-07

## 2018-02-28 RX ORDER — DEXTROSE MONOHYDRATE AND SODIUM CHLORIDE 5; .9 G/100ML; G/100ML
125 INJECTION, SOLUTION INTRAVENOUS CONTINUOUS
Status: DISCONTINUED | OUTPATIENT
Start: 2018-02-28 | End: 2018-03-01

## 2018-02-28 RX ORDER — SODIUM CHLORIDE 9 MG/ML
125 INJECTION, SOLUTION INTRAVENOUS CONTINUOUS
Status: DISCONTINUED | OUTPATIENT
Start: 2018-02-28 | End: 2018-02-28

## 2018-02-28 RX ORDER — MORPHINE SULFATE 2 MG/ML
1 INJECTION, SOLUTION INTRAMUSCULAR; INTRAVENOUS
Status: COMPLETED | OUTPATIENT
Start: 2018-02-28 | End: 2018-02-28

## 2018-02-28 RX ORDER — NAPROXEN 250 MG/1
500 TABLET ORAL
COMMUNITY
End: 2018-05-03

## 2018-02-28 RX ORDER — OSELTAMIVIR PHOSPHATE 75 MG/1
75 CAPSULE ORAL 2 TIMES DAILY
Status: DISCONTINUED | OUTPATIENT
Start: 2018-02-28 | End: 2018-03-01

## 2018-02-28 RX ORDER — DEXTROSE 50 % IN WATER (D50W) INTRAVENOUS SYRINGE
12.5-25 AS NEEDED
Status: DISCONTINUED | OUTPATIENT
Start: 2018-02-28 | End: 2018-03-05 | Stop reason: HOSPADM

## 2018-02-28 RX ORDER — SODIUM CHLORIDE 0.9 % (FLUSH) 0.9 %
5-10 SYRINGE (ML) INJECTION AS NEEDED
Status: DISCONTINUED | OUTPATIENT
Start: 2018-02-28 | End: 2018-03-05 | Stop reason: HOSPADM

## 2018-02-28 RX ORDER — OSELTAMIVIR PHOSPHATE 75 MG/1
75 CAPSULE ORAL
Status: DISCONTINUED | OUTPATIENT
Start: 2018-02-28 | End: 2018-02-28 | Stop reason: SDUPTHER

## 2018-02-28 RX ORDER — SIMVASTATIN 20 MG/1
10 TABLET, FILM COATED ORAL
COMMUNITY

## 2018-02-28 RX ORDER — DEXTROSE 50 % IN WATER (D50W) INTRAVENOUS SYRINGE
12.5-25 AS NEEDED
Status: DISCONTINUED | OUTPATIENT
Start: 2018-02-28 | End: 2018-02-28 | Stop reason: SDUPTHER

## 2018-02-28 RX ORDER — INSULIN LISPRO 100 [IU]/ML
INJECTION, SOLUTION INTRAVENOUS; SUBCUTANEOUS
Status: DISCONTINUED | OUTPATIENT
Start: 2018-03-01 | End: 2018-03-02

## 2018-02-28 RX ORDER — MORPHINE SULFATE 2 MG/ML
1 INJECTION, SOLUTION INTRAMUSCULAR; INTRAVENOUS
Status: DISCONTINUED | OUTPATIENT
Start: 2018-02-28 | End: 2018-03-05 | Stop reason: HOSPADM

## 2018-02-28 RX ORDER — ACETAMINOPHEN 500 MG
1000 TABLET ORAL
COMMUNITY
End: 2018-05-03

## 2018-02-28 RX ORDER — OSELTAMIVIR PHOSPHATE 75 MG/1
75 CAPSULE ORAL 2 TIMES DAILY
COMMUNITY
End: 2018-05-03

## 2018-02-28 RX ORDER — ONDANSETRON 2 MG/ML
4 INJECTION INTRAMUSCULAR; INTRAVENOUS
Status: COMPLETED | OUTPATIENT
Start: 2018-02-28 | End: 2018-02-28

## 2018-02-28 RX ORDER — OSELTAMIVIR PHOSPHATE 6 MG/ML
30 FOR SUSPENSION ORAL
Status: DISCONTINUED | OUTPATIENT
Start: 2018-02-28 | End: 2018-02-28 | Stop reason: DRUGHIGH

## 2018-02-28 RX ORDER — MAGNESIUM SULFATE 100 %
4 CRYSTALS MISCELLANEOUS AS NEEDED
Status: DISCONTINUED | OUTPATIENT
Start: 2018-02-28 | End: 2018-02-28 | Stop reason: SDUPTHER

## 2018-02-28 RX ORDER — SODIUM CHLORIDE 0.9 % (FLUSH) 0.9 %
5-10 SYRINGE (ML) INJECTION EVERY 8 HOURS
Status: DISCONTINUED | OUTPATIENT
Start: 2018-02-28 | End: 2018-03-05 | Stop reason: HOSPADM

## 2018-02-28 RX ORDER — MAGNESIUM SULFATE 100 %
4 CRYSTALS MISCELLANEOUS AS NEEDED
Status: DISCONTINUED | OUTPATIENT
Start: 2018-02-28 | End: 2018-03-02

## 2018-02-28 RX ORDER — OSELTAMIVIR PHOSPHATE 75 MG/1
75 CAPSULE ORAL
Status: DISCONTINUED | OUTPATIENT
Start: 2018-02-28 | End: 2018-02-28 | Stop reason: DRUGHIGH

## 2018-02-28 RX ORDER — ACETAMINOPHEN 325 MG/1
650 TABLET ORAL
Status: DISCONTINUED | OUTPATIENT
Start: 2018-02-28 | End: 2018-03-05 | Stop reason: HOSPADM

## 2018-02-28 RX ORDER — INSULIN LISPRO 100 [IU]/ML
INJECTION, SOLUTION INTRAVENOUS; SUBCUTANEOUS
COMMUNITY
End: 2018-03-05

## 2018-02-28 RX ADMIN — SODIUM CHLORIDE 7.9 UNITS/HR: 900 INJECTION, SOLUTION INTRAVENOUS at 18:43

## 2018-02-28 RX ADMIN — MORPHINE SULFATE 1 MG: 2 INJECTION, SOLUTION INTRAMUSCULAR; INTRAVENOUS at 21:08

## 2018-02-28 RX ADMIN — ACETAMINOPHEN 650 MG: 325 TABLET ORAL at 23:54

## 2018-02-28 RX ADMIN — SODIUM CHLORIDE 3.8 UNITS/HR: 900 INJECTION, SOLUTION INTRAVENOUS at 22:41

## 2018-02-28 RX ADMIN — DEXTROSE MONOHYDRATE AND SODIUM CHLORIDE 125 ML/HR: 5; .9 INJECTION, SOLUTION INTRAVENOUS at 22:57

## 2018-02-28 RX ADMIN — MORPHINE SULFATE 1 MG: 2 INJECTION, SOLUTION INTRAMUSCULAR; INTRAVENOUS at 23:33

## 2018-02-28 RX ADMIN — ONDANSETRON 4 MG: 2 INJECTION INTRAMUSCULAR; INTRAVENOUS at 18:48

## 2018-02-28 RX ADMIN — LIDOCAINE HYDROCHLORIDE 40 ML: 20 SOLUTION ORAL; TOPICAL at 18:47

## 2018-02-28 RX ADMIN — OSELTAMIVIR PHOSPHATE 75 MG: 75 CAPSULE ORAL at 21:05

## 2018-02-28 NOTE — IP AVS SNAPSHOT
[address] [place] [address] [place] [address] 
[phone] Patient: Donzella Hammans MRN: [id number] CUU:1[birth year]11[birth year] About your hospitalization You were admitted on:  February 28, 2018 You last received care in the:  [facility] You were discharged on:  March 5, 2018 Why you were hospitalized Your primary diagnosis was:  Dka (Diabetic Ketoacidoses) (Hcc) Follow-up Information Follow up With Details Comments Contact Info Rosanne Bland MD On 3/12/2018 Hospital follow up PCP appointment on Monday, 3/12/18 @ 12:15 p.m.  [facility] [address] [facility] Internal Medicine [address] 
[phone] Phil Quinones MD In 10 days 1-2 wks for bacteremia 163 [facility] 169 [address] [facility] [address] [place] [address] 
[phone] [facility] On 3/6/2018 4pm tomorrow, then 3 pm March 7th, and 4 pm March 8th 5875 4101 [facility] [address] 
[phone] Your Scheduled Appointments Tuesday March 06, 2018  4:00 PM EST INFUSION 30 with RM 102A- CHAIR [address] ([address]) [facility] [address] [phone]  
[phone] Go to [address], ground floor. Wednesday March 07, 2018  3:00 PM EST INFUSION 30 with RM 102A- CHAIR [address] ([address]) [address] [facility] [address] [phone]  
[phone] Go to [address], ground floor. Thursday March 08, 2018  4:00 PM EST INFUSION 30 with RM 102A- CHAIR [address] ([address]) [address] [place] [address] [address] [phone]  
[phone] Go to [address], ground floor. Discharge Orders None A check marguerite indicates which time of day the medication should be taken. My Medications CHANGE how you take these medications Instructions Each Dose to Equal  
 Morning Noon Evening Bedtime  
 insulin glargine 100 unit/mL (3 mL) Inpn Commonly known as:  Althea Burden What changed:  additional instructions Your last dose was: Your next dose is:    
   
   
 32 units daily  
     
   
   
   
  
 insulin lispro 100 unit/mL injection Commonly known as:  HUMALOG What changed:   
- how to take this 
- additional instructions Your last dose was: Your next dose is:    
   
   
 4 units TIDAC plus SSI  INITIATE CORRECTIVE INSULIN PROTOCOL (MAEGAN): RX MAEGAN Normal Sensitivity (Average weight)  AC (before meals), Q6H, and Q4H CORRECTIONAL SCALE only For Blood Sugar (mg/dl) of :            140-199=2 units           200-249=3 units 250-299=5 units 300-349=7 units 350 or g CONTINUE taking these medications Instructions Each Dose to Equal  
 Morning Noon Evening Bedtime  
 acetaminophen 500 mg tablet Commonly known as:  TYLENOL Your last dose was: Your next dose is: Take 1,000 mg by mouth every six (6) hours as needed for Pain or Fever. 1000 mg  
    
   
   
   
  
 aspirin 81 mg tablet Your last dose was: Your next dose is: Take 81 mg by mouth. 81 mg  
    
   
   
   
  
 BENADRYL 25 mg capsule Generic drug:  diphenhydrAMINE Your last dose was: Your next dose is: Take 50 mg by mouth every six (6) hours as needed for Itching or Sleep. 50 mg  
    
   
   
   
  
 naproxen 250 mg tablet Commonly known as:  NAPROSYN Your last dose was: Your next dose is: Take 500 mg by mouth two (2) times daily as needed for Pain or Fever. 500 mg  
    
   
   
   
  
 simvastatin 20 mg tablet Commonly known as:  ZOCOR Your last dose was: Your next dose is: Take 10 mg by mouth nightly. 10 mg  
    
   
   
   
  
 TAMIFLU 75 mg capsule Generic drug:  oseltamivir Your last dose was: Your next dose is: Take 75 mg by mouth two (2) times a day. 75 mg  
    
   
   
   
  
 VICKS NyQuil Cold/Flu Liquicap 6.25- mg Cap Generic drug:  doxylamine-dm-acetaminophen Your last dose was: Your next dose is: Take 1 Cap by mouth nightly. 1 Cap VITAMIN D2 PO Your last dose was: Your next dose is: Take 1,000 Units by mouth. 1000 Units Where to Get Your Medications Information on where to get these meds will be given to you by the nurse or doctor. ! Ask your nurse or doctor about these medications  
  insulin glargine 100 unit/mL (3 mL) Inpn  
 insulin lispro 100 unit/mL injection Discharge Instructions Discharge Instructions PATIENT ID: Faisal Paulson MRN: [id number] YOB: 1990 DATE OF ADMISSION: 2/28/2018  5:12 PM   
DATE OF DISCHARGE: 3/5/2018 PRIMARY CARE PROVIDER: Peri Gandhi MD  
 
ATTENDING PHYSICIAN: Jacklyn Tapia MD;Jhoan* DISCHARGING PROVIDER: Karlo Chung MD   
To contact this individual call [phone] and ask the  to page. If unavailable ask to be transferred the Adult Hospitalist Department. DISCHARGE DIAGNOSES  
DKA,resolved 
uncontrolled DM I, HbA1c 11.0 
E.coli bacteremia Acute pyelonephritis CONSULTATIONS: IP CONSULT TO HOSPITALIST 
IP CONSULT TO INFECTIOUS DISEASES 
IP CONSULT TO UROLOGY PROCEDURES/SURGERIES: * No surgery found * PENDING TEST RESULTS:  
At the time of discharge the following test results are still pending: none FOLLOW UP APPOINTMENTS:  
Follow-up Information Follow up With Details Comments Contact Info Carmine Ly MD   [facility] [address] [facility] Internal Medicine [address] 
[phone] Geri Lockhart MD In 2 weeks 1-2 wks for bacteremia 163 [facility] 16[address] [facility] [address] [place] [address] 
[phone] ADDITIONAL CARE RECOMMENDATIONS:  
Home IV Orders 1. Diagnosis:   E Coli bacteremia 2. Routine PICC Care 3. Antibiotic:  Ceftriaxone 1 gm daily IV 4. Lab each Monday: CBC/diff/platelets CMP 
   
5. Lab each Thursday: CBC/diff/platelets BUN Creatinine 
  
6. Fax lab to Dr. Mya Kerr @ [phone]. 
7.  Call Dr. Mya Kerr @ [phone] for fever >100.5, infection at PICC site, diarrhea, WBC > 15 or < 3, cr > 1.8 8. Duration of therapy: last day of therapy should be march 16, 2018 Please call Dr. Mya Kerr @ [phone] before stopping therapy. 9.  Allergies:  No Known Allergies 10. Make appointment in 10 days DIET: Diabetic Diet ACTIVITY: Activity as tolerated WOUND CARE: none EQUIPMENT needed: none DISCHARGE MEDICATIONS: 
 See Medication Reconciliation Form · It is important that you take the medication exactly as they are prescribed. · Keep your medication in the bottles provided by the pharmacist and keep a list of the medication names, dosages, and times to be taken in your wallet. · Do not take other medications without consulting your doctor. NOTIFY YOUR PHYSICIAN FOR ANY OF THE FOLLOWING:  
Fever over 101 degrees for 24 hours. Chest pain, shortness of breath, fever, chills, nausea, vomiting, diarrhea, change in mentation, falling, weakness, bleeding. Severe pain or pain not relieved by medications. Or, any other signs or symptoms that you may have questions about.  
 
 
DISPOSITION: 
 x Home With: 
 OT  PT x HH  RN  
  
 SNF/Inpatient Rehab/LTAC  
 Independent/assisted living Hospice Other: CDMP Checked:  
Yes x PROBLEM LIST Updated: 
Yes x Signed:  
Prema Krishnan MD 
3/5/2018 
1:01 PM 
 
 
  
  
  
[employer] Announcement We are excited to announce that we are making your provider's discharge notes available to you in [employer]. You will see these notes when they are completed and signed by the physician that discharged you from your recent hospital stay. If you have any questions or concerns about any information you see in [employer], please call the Health Information Department where you were seen or reach out to your Primary Care Provider for more information about your plan of care. Introducing [facility] & HE[facility]! Dear Akua Penitas: 
Thank you for requesting a [employer] account. Our records indicate that you already have an active [employer] account. You can access your account anytime at [email or web address] [email or web address] Grow Did you know that you can access your hospital and ER discharge instructions at any time in [employer]? You can also review all of your test results from your hospital stay or ER visit. Additional Information If you have questions, please visit the Frequently Asked Questions section of the [employer] website at [email or web address] Remember, [employer] is NOT to be used for urgent needs. For medical emergencies, dial 911. Now available from your iPhone and Android! Unresulted Labs-Please follow up with your PCP about these lab tests Order Current Status CULTURE, BLOOD Preliminary result CULTURE, BLOOD, PAIRED Preliminary result Providers Seen During Your Hospitalization Provider Specialty Primary office phone Mariah Angelo MD Emergency Medicine [phone] Karlyne Kanner, MD Internal Medicine [phone] Reji Caruso MD Hospitalist [phone] Prema Krishnan MD Internal Medicine [phone] Your Primary Care Physician (PCP) Primary Care Physician Office Phone Office Fax 1400 [facility], 2[address] [facility] [phone] You are allergic to the following No active allergies Recent Documentation Height Weight BMI OB Status Smoking Status 1.638 m 62.7 kg 23.36 kg/m2 Having regular periods Former Smoker Emergency Contacts Name Discharge Info Relation Home Work Mobile [address] [facility] CAREGIVER [3] Parent [1] [phone] Patient Belongings The following personal items are in your possession at time of discharge: 
  Dental Appliances: None  Visual Aid: Glasses      Home Medications: None   Jewelry: Ring, With patient  Clothing: Shirt, Socks, Footwear, Undergarments, With patient, Pants    Other Valuables: Cell Phone Discharge Instructions Attachments/References DIABETIC KETOACIDOSIS (DKA) (ENGLISH) PYELONEPHRITIS (ENGLISH) Patient Handouts Diabetic Ketoacidosis (DKA): Care Instructions Your Care Instructions Diabetic ketoacidosis (DKA) happens when the body does not have enough insulin and can't get the sugar it needs for energy. When the body can't use sugar for energy, it starts to use fat for energy. This process makes fatty acids called ketones. The ketones build up in the blood and change the chemical balance in your body. This problem can be very dangerous and needs to be treated. Without treatment, it can lead to a coma or death. DKA occurs most often in people with type 1 diabetes. But people with type 2 diabetes also can get it. DKA can be caused by many things. It can happen if you don't take enough insulin. It can also happen if you have an infection or illness like the flu. Sometimes it happens if you are very dehydrated. DKA can only be treated with insulin and fluids. These are often given in a vein (IV). Follow-up care is a key part of your treatment and safety.  Be sure to make and go to all appointments, and call your doctor if you are having problems. It's also a good idea to know your test results and keep a list of the medicines you take. How can you care for yourself at home? To reduce your chance of ketoacidosis: · Take your insulin and other diabetes medicines on time and in the right dose. ¨ If an infection caused your DKA and your doctor prescribed antibiotics, take them as directed. Do not stop taking them just because you feel better. You need to take the full course of antibiotics. · Test your blood sugar before meals and at bedtime or as often as your doctor advises. This is the best way to know when your blood sugar is high so you can treat it early. Watching for symptoms is not as helpful. This is because you may not have symptoms until your blood sugar is very high. Or you may not notice them. · Teach others at work and at home how to check your blood sugar. Make sure that someone else knows how do it in case you can't. · Wear or carry medical identification at all times. This is very important in case you are too sick or injured to speak for yourself. · Talk to your doctor about when you can start to exercise again. · Eat regular meals that spread your calories and carbohydrate throughout the day. This will help keep your blood sugar steady. · When you are sick: ¨ Take your insulin and diabetes medicines. This is important even if you are vomiting and having trouble eating or drinking. Your blood sugar may go up because you are sick. If you are eating less than normal, you may need to change your dose of insulin. Talk with your doctor about a plan when you are well. Then you will know what to do when you are sick. ¨ Drink extra fluids to prevent dehydration. These include water, broth, and sugar-free drinks. If you don't drink enough, the insulin from your shot may not get into your blood. So your blood sugar may go up. ¨ Try to eat as you normally do, with a focus on healthy food choices. ¨ Check your blood sugar at least every 3 to 4 hours. Check it more often if it's rising fast. If your doctor has told you to take an extra insulin dose for high blood sugar levels (for example, above 240 mg/dL) be sure to take the right amount. If you're not sure how much to take, call your doctor. ¨ Check your temperature and pulse often. If your temperature goes up, call your doctor. You may be getting worse. ¨ If you take insulin, check your urine or blood for ketones, especially when you have high blood sugar (for example, above 240 mg/dL). Call your doctor if your ketone level is moderate or high. If you know your blood sugar is high, treat it before it gets worse. · If you missed your usual dose of insulin or other diabetes medicine, take the missed dose or take the amount your doctor told you to take if this happens. · If you and your doctor decide on a dose of extra-fast-acting insulin, give yourself the right dose. If you take insulin and your doctor has not told you how much fast-acting insulin to take based on your blood sugar level, call your doctor. · Drink extra water or sugar-free drinks to prevent dehydration. · Wait 30 minutes after you take extra insulin or missed medicines. Then check your blood sugar again. · If symptoms of high blood sugar get worse or your blood sugar level keeps rising, call your doctor. If you start to feel sleepy or confused, call 911. When should you call for help? Call 911 anytime you think you may need emergency care. For example, call if: 
· You passed out (lost consciousness). · You are confused or cannot think clearly. · Your blood sugar is very high or very low. Watch closely for changes in your health, and be sure to contact your doctor if: 
· Your blood sugar stays outside the level your doctor set for you. · You have any problems. Where can you learn more? Go to http://sandro-apolinar.info/. Kalyan Champagne in the search box to learn more about \"Diabetic Ketoacidosis (DKA): Care Instructions. \" Current as of: March 13, 2017 Content Version: 11.4 © 4809-5715 [employer]. Care instructions adapted under license by [employer] (which disclaims liability or warranty for this information). If you have questions about a medical condition or this instruction, always ask your healthcare professional. [place] [address] any warranty or liability for your use of this information. Kidney Infection: Care Instructions Your Care Instructions A kidney infection (pyelonephritis) is a type of urinary tract infection, or UTI. Most UTIs are bladder infections. Kidney infections tend to make people much sicker than bladder infections do. A kidney infection is also more serious because it can cause lasting damage if it is not treated quickly. Follow-up care is a key part of your treatment and safety. Be sure to make and go to all appointments, and call your doctor if you are having problems. It's also a good idea to know your test results and keep a list of the medicines you take. How can you care for yourself at home? · Take your antibiotics as directed. Do not stop taking them just because you feel better. You need to take the full course of antibiotics. · Drink plenty of water, enough so that your urine is light yellow or clear like water. This may help wash out bacteria that are causing the infection. If you have kidney, heart, or liver disease and have to limit fluids, talk with your doctor before you increase the amount of fluids you drink. · Urinate often. Try to empty your bladder each time. · To relieve pain, take a hot shower or lay a heating pad (set on low) over your lower belly. Never go to sleep with a heating pad in place. Put a thin cloth between the heating pad and your skin. To help prevent kidney infections · Drink plenty of water each day. This helps you urinate often, which clears bacteria from your system. If you have kidney, heart, or liver disease and have to limit fluids, talk with your doctor before you increase the amount of fluids you drink. · Urinate when you have the urge. Do not hold your urine for a long time. Urinate before you go to sleep. · If you have symptoms of a bladder infection, such as burning when you urinate or having to urinate often, call your doctor so you can treat the problem before it gets worse. If you do not treat a bladder infection quickly, it can spread to the kidney. · Men should keep the tip of the penis clean. If you are a woman, keep these ideas in mind: · Urinate right after you have sex. · Change sanitary pads often. Avoid douches, feminine hygiene sprays, and other feminine hygiene products that have deodorants. · After going to the bathroom, wipe from front to back. When should you call for help? Call your doctor now or seek immediate medical care if: 
? · You have symptoms that a kidney infection is getting worse. These may include: 
¨ Pain or burning when you urinate. ¨ A frequent need to urinate without being able to pass much urine. ¨ Pain in the flank, which is just below the rib cage and above the waist on either side of the back. ¨ Blood in the urine. ¨ A fever. ? · You are vomiting or nauseated. ? Watch closely for changes in your health, and be sure to contact your doctor if: 
? · You do not get better as expected. Where can you learn more? Go to http://sandro-apolinar.info/. Enter D822 in the search box to learn more about \"Kidney Infection: Care Instructions. \" Current as of: May 12, 2017 Content Version: 11.4 © 4058-6820 [employer].  Care instructions adapted under license by [employer] (which disclaims liability or warranty for this information). If you have questions about a medical condition or this instruction, always ask your healthcare professional. [address] any warranty or liability for your use of this information. Please provide this summary of care documentation to your next provider. Signatures-by signing, you are acknowledging that this After Visit Summary has been reviewed with you and you have received a copy. Patient Signature:  ____________________________________________________________ Date:  ____________________________________________________________  
  
Ann Worrellsmith Provider Signature:  ____________________________________________________________ Date:  ____________________________________________________________

## 2018-02-28 NOTE — ED NOTES
4:57 PM  I have evaluated the patient as the Provider in Triage. I have reviewed Her vital signs and the triage nurse assessment. I have talked with the patient and any available family and advised that I am the provider in triage and have ordered the appropriate study to initiate their work up based on the clinical presentation during my assessment. I have advised that the patient will be accommodated in the Main ED as soon as possible. I have also requested to contact the triage nurse or myself immediately if the patient experiences any changes in their condition during this brief waiting period. Patient states that she was seen at [facility] for fever x 3 days along with N/V. States that she is having RUQ pain that radiates around into her back.  Abdominal pain started prior to taking Tamiflu   Sherine Acuna, NP

## 2018-02-28 NOTE — ED PROVIDER NOTES
HPI         26y F with hx of IDDM here with vomiting, fever, body aches, and fast breathing. Has had fever for the past 2-3 days. [facility] and started on tamiflu. Started to vomit once at home and had continued to feel badly today. Decreased PO intake. Says she is hurting all over. No rash. No sick contacts nothing makes sx's better or worse. She has had DM since the age of 27. Was in DKA at presentation but none since. Has continued to take her insulin despite not taking PO as well. Past Medical History:   Diagnosis Date         Diabetes mellitus        History reviewed. No pertinent surgical history. Family History:   Problem Relation Age of Onset    Cancer Paternal Grandfather        Social History     Social History    Marital status: SINGLE     Spouse name: N/A    Number of children: N/A    Years of education: N/A     Occupational History    Not on file. Social History Main Topics    Smoking status: Former Smoker     Packs/day: 0.00    Smokeless tobacco: Never Used      Comment: quit date 17    Alcohol use No    Drug use: No    Sexual activity: Yes     Partners: Male     Other Topics Concern    Not on file     Social History Narrative         ALLERGIES: Review of patient's allergies indicates no known allergies. Review of Systems  Review of Systems   Constitutional: (-) weight loss. HEENT: (-) stiff neck   Eyes: (-) discharge. Respiratory: (-) for cough. Cardiovascular: (-) syncope. Gastrointestinal: (-) blood in stool. Genitourinary: (-) hematuria. Musculoskeletal: (-) myalgias. Neurological: (-) seizure. Skin: (-) petechiae  Lymph/Immunologic: (-) enlarged lymph nodes  All other systems reviewed and are negative.       Vitals:    18 1814 18 1815 18 18118 181   BP: 128/69 133/65 123/63 131/70   Pulse:       Resp:       Temp:       SpO2:  99% 98% 99%   Weight:       Height:                Physical Exam Nursing note and vitals reviewed. Constitutional: oriented to person, place, and time. appears well-developed and well-nourished. No distress. Head: Normocephalic and atraumatic. Sclera anicteric  Nose: No rhinorrhea  Mouth/Throat: Oropharynx is clear and moist. Pharynx normal  Eyes: Conjunctivae are normal. Pupils are equal, round, and reactive to light. Right eye exhibits no discharge. Left eye exhibits no discharge. Neck: Painless normal range of motion. Neck supple. No LAD. Cardiovascular: Normal rate, regular rhythm, normal heart sounds and intact distal pulses. Exam reveals no gallop and no friction rub. No murmur heard. Pulmonary/Chest:  No respiratory distress but tachypneic. No wheezes. No rales. No rhonchi. No increased work of breathing. No accessory muscle use. Good air exchange throughout. Abdominal: soft, non-tender, no rebound or guarding. No hepatosplenomegaly. Normal bowel sounds throughout. Back: no tenderness to palpation, no deformities, no CVA tenderness  Extremities/Musculoskeletal: Normal range of motion. no tenderness. No edema. Distal extremities are neurovasc intact. Lymphadenopathy:   No adenopathy. Neurological:  Alert and oriented to person, place, and time. Coordination normal. CN 2-12 intact. Motor and sensory function intact. Skin: Skin is warm and dry. No rash noted. No pallor. MDM 26y F here with DKA. Likely triggered by the flu or similar viral process. Will need fluids, insulin, and admission.       ED Course       Procedures

## 2018-02-28 NOTE — ED TRIAGE NOTES
Pt complains of fever for 3 days along with n/v, abd pain, bilat. flank pain, shortness of breath and \"feeling foggy\". Denies cough. Seen at [facility] last night, told the flu test was negative, given Rx for Tamiflu.

## 2018-02-28 NOTE — ED NOTES
Bedside and Verbal shift change report given to [facility] (oncoming nurse) by Adri Ferguson RN (offgoing nurse). Report included the following information SBAR, ED Summary, MAR and Recent Results.

## 2018-03-01 ENCOUNTER — APPOINTMENT (OUTPATIENT)
Dept: CT IMAGING | Age: 28
DRG: 871 | End: 2018-03-01
Attending: INTERNAL MEDICINE
Payer: COMMERCIAL

## 2018-03-01 LAB
ADMINISTERED INITIALS, ADMINIT: NORMAL
ANION GAP SERPL CALC-SCNC: 11 MMOL/L (ref 5–15)
ANION GAP SERPL CALC-SCNC: 11 MMOL/L (ref 5–15)
ANION GAP SERPL CALC-SCNC: 14 MMOL/L (ref 5–15)
ANION GAP SERPL CALC-SCNC: 17 MMOL/L (ref 5–15)
ANION GAP SERPL CALC-SCNC: 7 MMOL/L (ref 5–15)
B PERT DNA SPEC QL NAA+PROBE: NOT DETECTED
BASOPHILS # BLD: 0 K/UL (ref 0–0.1)
BASOPHILS NFR BLD: 0 % (ref 0–1)
BUN SERPL-MCNC: 11 MG/DL (ref 6–20)
BUN SERPL-MCNC: 14 MG/DL (ref 6–20)
BUN SERPL-MCNC: 16 MG/DL (ref 6–20)
BUN SERPL-MCNC: 17 MG/DL (ref 6–20)
BUN SERPL-MCNC: 9 MG/DL (ref 6–20)
BUN/CREAT SERPL: 14 (ref 12–20)
BUN/CREAT SERPL: 14 (ref 12–20)
BUN/CREAT SERPL: 18 (ref 12–20)
BUN/CREAT SERPL: 19 (ref 12–20)
BUN/CREAT SERPL: 22 (ref 12–20)
C PNEUM DNA SPEC QL NAA+PROBE: NOT DETECTED
CALCIUM SERPL-MCNC: 8 MG/DL (ref 8.5–10.1)
CALCIUM SERPL-MCNC: 8.1 MG/DL (ref 8.5–10.1)
CALCIUM SERPL-MCNC: 8.3 MG/DL (ref 8.5–10.1)
CALCIUM SERPL-MCNC: 8.6 MG/DL (ref 8.5–10.1)
CALCIUM SERPL-MCNC: 8.9 MG/DL (ref 8.5–10.1)
CHLORIDE SERPL-SCNC: 103 MMOL/L (ref 97–108)
CHLORIDE SERPL-SCNC: 106 MMOL/L (ref 97–108)
CHLORIDE SERPL-SCNC: 106 MMOL/L (ref 97–108)
CHLORIDE SERPL-SCNC: 107 MMOL/L (ref 97–108)
CHLORIDE SERPL-SCNC: 109 MMOL/L (ref 97–108)
CO2 SERPL-SCNC: 13 MMOL/L (ref 21–32)
CO2 SERPL-SCNC: 15 MMOL/L (ref 21–32)
CO2 SERPL-SCNC: 18 MMOL/L (ref 21–32)
CO2 SERPL-SCNC: 19 MMOL/L (ref 21–32)
CO2 SERPL-SCNC: 26 MMOL/L (ref 21–32)
CREAT SERPL-MCNC: 0.65 MG/DL (ref 0.55–1.02)
CREAT SERPL-MCNC: 0.66 MG/DL (ref 0.55–1.02)
CREAT SERPL-MCNC: 0.8 MG/DL (ref 0.55–1.02)
CREAT SERPL-MCNC: 0.83 MG/DL (ref 0.55–1.02)
CREAT SERPL-MCNC: 0.93 MG/DL (ref 0.55–1.02)
D50 ADMINISTERED, D50ADM: 0 ML
D50 ORDER, D50ORD: 0 ML
DIFFERENTIAL METHOD BLD: ABNORMAL
EOSINOPHIL # BLD: 0 K/UL (ref 0–0.4)
EOSINOPHIL NFR BLD: 0 % (ref 0–7)
ERYTHROCYTE [DISTWIDTH] IN BLOOD BY AUTOMATED COUNT: 12.3 % (ref 11.5–14.5)
EST. AVERAGE GLUCOSE BLD GHB EST-MCNC: 263 MG/DL
FLUAV H1 2009 PAND RNA SPEC QL NAA+PROBE: NOT DETECTED
FLUAV H1 RNA SPEC QL NAA+PROBE: NOT DETECTED
FLUAV H3 RNA SPEC QL NAA+PROBE: NOT DETECTED
FLUAV SUBTYP SPEC NAA+PROBE: NOT DETECTED
FLUBV RNA SPEC QL NAA+PROBE: NOT DETECTED
GLSCOM COMMENTS: NORMAL
GLUCOSE BLD STRIP.AUTO-MCNC: 141 MG/DL (ref 65–100)
GLUCOSE BLD STRIP.AUTO-MCNC: 165 MG/DL (ref 65–100)
GLUCOSE BLD STRIP.AUTO-MCNC: 166 MG/DL (ref 65–100)
GLUCOSE BLD STRIP.AUTO-MCNC: 170 MG/DL (ref 65–100)
GLUCOSE BLD STRIP.AUTO-MCNC: 175 MG/DL (ref 65–100)
GLUCOSE BLD STRIP.AUTO-MCNC: 176 MG/DL (ref 65–100)
GLUCOSE BLD STRIP.AUTO-MCNC: 181 MG/DL (ref 65–100)
GLUCOSE BLD STRIP.AUTO-MCNC: 184 MG/DL (ref 65–100)
GLUCOSE BLD STRIP.AUTO-MCNC: 189 MG/DL (ref 65–100)
GLUCOSE BLD STRIP.AUTO-MCNC: 191 MG/DL (ref 65–100)
GLUCOSE BLD STRIP.AUTO-MCNC: 192 MG/DL (ref 65–100)
GLUCOSE BLD STRIP.AUTO-MCNC: 208 MG/DL (ref 65–100)
GLUCOSE BLD STRIP.AUTO-MCNC: 223 MG/DL (ref 65–100)
GLUCOSE BLD STRIP.AUTO-MCNC: 229 MG/DL (ref 65–100)
GLUCOSE BLD STRIP.AUTO-MCNC: 236 MG/DL (ref 65–100)
GLUCOSE BLD STRIP.AUTO-MCNC: 259 MG/DL (ref 65–100)
GLUCOSE BLD STRIP.AUTO-MCNC: 279 MG/DL (ref 65–100)
GLUCOSE SERPL-MCNC: 172 MG/DL (ref 65–100)
GLUCOSE SERPL-MCNC: 184 MG/DL (ref 65–100)
GLUCOSE SERPL-MCNC: 199 MG/DL (ref 65–100)
GLUCOSE SERPL-MCNC: 267 MG/DL (ref 65–100)
GLUCOSE SERPL-MCNC: 293 MG/DL (ref 65–100)
GLUCOSE, GLC: 141 MG/DL
GLUCOSE, GLC: 165 MG/DL
GLUCOSE, GLC: 166 MG/DL
GLUCOSE, GLC: 170 MG/DL
GLUCOSE, GLC: 175 MG/DL
GLUCOSE, GLC: 176 MG/DL
GLUCOSE, GLC: 181 MG/DL
GLUCOSE, GLC: 184 MG/DL
GLUCOSE, GLC: 189 MG/DL
GLUCOSE, GLC: 191 MG/DL
GLUCOSE, GLC: 192 MG/DL
GLUCOSE, GLC: 208 MG/DL
GLUCOSE, GLC: 223 MG/DL
GLUCOSE, GLC: 229 MG/DL
GLUCOSE, GLC: 236 MG/DL
GLUCOSE, GLC: 259 MG/DL
GLUCOSE, GLC: 279 MG/DL
HADV DNA SPEC QL NAA+PROBE: NOT DETECTED
HBA1C MFR BLD: 10.8 % (ref 4.2–6.3)
HCOV 229E RNA SPEC QL NAA+PROBE: NOT DETECTED
HCOV HKU1 RNA SPEC QL NAA+PROBE: NOT DETECTED
HCOV NL63 RNA SPEC QL NAA+PROBE: NOT DETECTED
HCOV OC43 RNA SPEC QL NAA+PROBE: NOT DETECTED
HCT VFR BLD AUTO: 39.3 % (ref 35–47)
HGB BLD-MCNC: 13.2 G/DL (ref 11.5–16)
HIGH TARGET, HITG: 250 MG/DL
HMPV RNA SPEC QL NAA+PROBE: NOT DETECTED
HPIV1 RNA SPEC QL NAA+PROBE: NOT DETECTED
HPIV2 RNA SPEC QL NAA+PROBE: NOT DETECTED
HPIV3 RNA SPEC QL NAA+PROBE: NOT DETECTED
HPIV4 RNA SPEC QL NAA+PROBE: NOT DETECTED
IMM GRANULOCYTES # BLD: 0.1 K/UL (ref 0–0.04)
IMM GRANULOCYTES NFR BLD AUTO: 1 % (ref 0–0.5)
INSULIN ADMINSTERED, INSADM: 1.6 UNITS/HOUR
INSULIN ADMINSTERED, INSADM: 2.4 UNITS/HOUR
INSULIN ADMINSTERED, INSADM: 3.2 UNITS/HOUR
INSULIN ADMINSTERED, INSADM: 3.2 UNITS/HOUR
INSULIN ADMINSTERED, INSADM: 3.3 UNITS/HOUR
INSULIN ADMINSTERED, INSADM: 3.5 UNITS/HOUR
INSULIN ADMINSTERED, INSADM: 3.5 UNITS/HOUR
INSULIN ADMINSTERED, INSADM: 3.7 UNITS/HOUR
INSULIN ADMINSTERED, INSADM: 3.9 UNITS/HOUR
INSULIN ADMINSTERED, INSADM: 3.9 UNITS/HOUR
INSULIN ADMINSTERED, INSADM: 4 UNITS/HOUR
INSULIN ADMINSTERED, INSADM: 4.4 UNITS/HOUR
INSULIN ADMINSTERED, INSADM: 4.9 UNITS/HOUR
INSULIN ADMINSTERED, INSADM: 5.1 UNITS/HOUR
INSULIN ADMINSTERED, INSADM: 5.3 UNITS/HOUR
INSULIN ADMINSTERED, INSADM: 6 UNITS/HOUR
INSULIN ADMINSTERED, INSADM: 6.6 UNITS/HOUR
INSULIN ORDER, INSORD: 1.6 UNITS/HOUR
INSULIN ORDER, INSORD: 2.4 UNITS/HOUR
INSULIN ORDER, INSORD: 3.2 UNITS/HOUR
INSULIN ORDER, INSORD: 3.2 UNITS/HOUR
INSULIN ORDER, INSORD: 3.3 UNITS/HOUR
INSULIN ORDER, INSORD: 3.5 UNITS/HOUR
INSULIN ORDER, INSORD: 3.5 UNITS/HOUR
INSULIN ORDER, INSORD: 3.7 UNITS/HOUR
INSULIN ORDER, INSORD: 3.9 UNITS/HOUR
INSULIN ORDER, INSORD: 3.9 UNITS/HOUR
INSULIN ORDER, INSORD: 4 UNITS/HOUR
INSULIN ORDER, INSORD: 4.4 UNITS/HOUR
INSULIN ORDER, INSORD: 4.9 UNITS/HOUR
INSULIN ORDER, INSORD: 5.1 UNITS/HOUR
INSULIN ORDER, INSORD: 5.3 UNITS/HOUR
INSULIN ORDER, INSORD: 6 UNITS/HOUR
INSULIN ORDER, INSORD: 6.6 UNITS/HOUR
LOW TARGET, LOT: 150 MG/DL
LYMPHOCYTES # BLD: 1.2 K/UL (ref 0.8–3.5)
LYMPHOCYTES NFR BLD: 7 % (ref 12–49)
M PNEUMO DNA SPEC QL NAA+PROBE: NOT DETECTED
MAGNESIUM SERPL-MCNC: 1.6 MG/DL (ref 1.6–2.4)
MAGNESIUM SERPL-MCNC: 1.7 MG/DL (ref 1.6–2.4)
MAGNESIUM SERPL-MCNC: 1.8 MG/DL (ref 1.6–2.4)
MAGNESIUM SERPL-MCNC: 2 MG/DL (ref 1.6–2.4)
MAGNESIUM SERPL-MCNC: 2.1 MG/DL (ref 1.6–2.4)
MCH RBC QN AUTO: 32.4 PG (ref 26–34)
MCHC RBC AUTO-ENTMCNC: 33.6 G/DL (ref 30–36.5)
MCV RBC AUTO: 96.3 FL (ref 80–99)
MINUTES UNTIL NEXT BG, NBG: 120 MIN
MINUTES UNTIL NEXT BG, NBG: 60 MIN
MONOCYTES # BLD: 2 K/UL (ref 0–1)
MONOCYTES NFR BLD: 12 % (ref 5–13)
MULTIPLIER, MUL: 0.02
MULTIPLIER, MUL: 0.02
MULTIPLIER, MUL: 0.03
NEUTS SEG # BLD: 12.7 K/UL (ref 1.8–8)
NEUTS SEG NFR BLD: 79 % (ref 32–75)
NRBC # BLD: 0 K/UL (ref 0–0.01)
NRBC BLD-RTO: 0 PER 100 WBC
ORDER INITIALS, ORDINIT: NORMAL
PHOSPHATE SERPL-MCNC: 1.7 MG/DL (ref 2.6–4.7)
PLATELET # BLD AUTO: 271 K/UL (ref 150–400)
PMV BLD AUTO: 9.6 FL (ref 8.9–12.9)
POTASSIUM SERPL-SCNC: 3.3 MMOL/L (ref 3.5–5.1)
POTASSIUM SERPL-SCNC: 3.6 MMOL/L (ref 3.5–5.1)
POTASSIUM SERPL-SCNC: 3.6 MMOL/L (ref 3.5–5.1)
POTASSIUM SERPL-SCNC: 3.7 MMOL/L (ref 3.5–5.1)
POTASSIUM SERPL-SCNC: 4 MMOL/L (ref 3.5–5.1)
RBC # BLD AUTO: 4.08 M/UL (ref 3.8–5.2)
RSV RNA SPEC QL NAA+PROBE: NOT DETECTED
RV+EV RNA SPEC QL NAA+PROBE: NOT DETECTED
SERVICE CMNT-IMP: ABNORMAL
SODIUM SERPL-SCNC: 135 MMOL/L (ref 136–145)
SODIUM SERPL-SCNC: 136 MMOL/L (ref 136–145)
SODIUM SERPL-SCNC: 139 MMOL/L (ref 136–145)
WBC # BLD AUTO: 16 K/UL (ref 3.6–11)

## 2018-03-01 PROCEDURE — 80048 BASIC METABOLIC PNL TOTAL CA: CPT | Performed by: INTERNAL MEDICINE

## 2018-03-01 PROCEDURE — 74011000258 HC RX REV CODE- 258: Performed by: FAMILY MEDICINE

## 2018-03-01 PROCEDURE — 82962 GLUCOSE BLOOD TEST: CPT

## 2018-03-01 PROCEDURE — 74011250637 HC RX REV CODE- 250/637: Performed by: INTERNAL MEDICINE

## 2018-03-01 PROCEDURE — 80048 BASIC METABOLIC PNL TOTAL CA: CPT | Performed by: FAMILY MEDICINE

## 2018-03-01 PROCEDURE — 74011636637 HC RX REV CODE- 636/637: Performed by: FAMILY MEDICINE

## 2018-03-01 PROCEDURE — 74011250637 HC RX REV CODE- 250/637: Performed by: FAMILY MEDICINE

## 2018-03-01 PROCEDURE — 36415 COLL VENOUS BLD VENIPUNCTURE: CPT | Performed by: FAMILY MEDICINE

## 2018-03-01 PROCEDURE — 85025 COMPLETE CBC W/AUTO DIFF WBC: CPT | Performed by: FAMILY MEDICINE

## 2018-03-01 PROCEDURE — 74011636320 HC RX REV CODE- 636/320: Performed by: INTERNAL MEDICINE

## 2018-03-01 PROCEDURE — 74177 CT ABD & PELVIS W/CONTRAST: CPT

## 2018-03-01 PROCEDURE — 83735 ASSAY OF MAGNESIUM: CPT | Performed by: FAMILY MEDICINE

## 2018-03-01 PROCEDURE — 74011250636 HC RX REV CODE- 250/636: Performed by: INTERNAL MEDICINE

## 2018-03-01 PROCEDURE — 74011000258 HC RX REV CODE- 258: Performed by: INTERNAL MEDICINE

## 2018-03-01 PROCEDURE — 65660000001 HC RM ICU INTERMED STEPDOWN

## 2018-03-01 RX ORDER — SODIUM CHLORIDE 0.9 % (FLUSH) 0.9 %
10 SYRINGE (ML) INJECTION
Status: COMPLETED | OUTPATIENT
Start: 2018-03-01 | End: 2018-03-01

## 2018-03-01 RX ORDER — DEXTROSE MONOHYDRATE, SODIUM CHLORIDE, SODIUM LACTATE, POTASSIUM CHLORIDE, CALCIUM CHLORIDE 5; 600; 310; 179; 20 G/100ML; MG/100ML; MG/100ML; MG/100ML; MG/100ML
INJECTION, SOLUTION INTRAVENOUS CONTINUOUS
Status: DISPENSED | OUTPATIENT
Start: 2018-03-01 | End: 2018-03-02

## 2018-03-01 RX ORDER — SIMVASTATIN 10 MG/1
10 TABLET, FILM COATED ORAL
Status: DISCONTINUED | OUTPATIENT
Start: 2018-03-01 | End: 2018-03-05 | Stop reason: HOSPADM

## 2018-03-01 RX ORDER — IBUPROFEN 600 MG/1
600 TABLET ORAL ONCE
Status: COMPLETED | OUTPATIENT
Start: 2018-03-01 | End: 2018-03-01

## 2018-03-01 RX ADMIN — SODIUM CHLORIDE 5.3 UNITS/HR: 900 INJECTION, SOLUTION INTRAVENOUS at 13:21

## 2018-03-01 RX ADMIN — Medication 10 ML: at 21:12

## 2018-03-01 RX ADMIN — PIPERACILLIN SODIUM AND TAZOBACTAM SODIUM 3.38 G: 3; .375 INJECTION, POWDER, LYOPHILIZED, FOR SOLUTION INTRAVENOUS at 13:22

## 2018-03-01 RX ADMIN — PIPERACILLIN AND TAZOBACTAM 10.12 G: 3; .375 INJECTION, POWDER, FOR SOLUTION INTRAVENOUS at 14:17

## 2018-03-01 RX ADMIN — IBUPROFEN 600 MG: 600 TABLET, FILM COATED ORAL at 20:06

## 2018-03-01 RX ADMIN — POTASSIUM CHLORIDE, SODIUM CHLORIDE, CALCIUM CHLORIDE, SODIUM LACTATE, AND DEXTROSE MONOHYDRATE: 1.79; 6; .2; 3.1; 5 INJECTION, SOLUTION INTRAVENOUS at 14:15

## 2018-03-01 RX ADMIN — SIMVASTATIN 10 MG: 10 TABLET, FILM COATED ORAL at 21:12

## 2018-03-01 RX ADMIN — POTASSIUM CHLORIDE, SODIUM CHLORIDE, CALCIUM CHLORIDE, SODIUM LACTATE, AND DEXTROSE MONOHYDRATE: 1.79; 6; .2; 3.1; 5 INJECTION, SOLUTION INTRAVENOUS at 23:42

## 2018-03-01 RX ADMIN — ACETAMINOPHEN 650 MG: 325 TABLET ORAL at 05:16

## 2018-03-01 RX ADMIN — IOPAMIDOL 100 ML: 755 INJECTION, SOLUTION INTRAVENOUS at 12:54

## 2018-03-01 RX ADMIN — ACETAMINOPHEN 650 MG: 325 TABLET ORAL at 18:35

## 2018-03-01 RX ADMIN — ACETAMINOPHEN 650 MG: 325 TABLET ORAL at 12:03

## 2018-03-01 RX ADMIN — SODIUM CHLORIDE 100 ML: 900 INJECTION, SOLUTION INTRAVENOUS at 12:55

## 2018-03-01 RX ADMIN — Medication 10 ML: at 12:55

## 2018-03-01 NOTE — DIABETES MGMT
DTC Insulin Drip   Progress Note     Recommendations/ Comments:  Patient on the Insulin drip for  Approximately 21 hours. Drip rate currently 4.9 units/hr. Pt has required 27.9 units insulin over last 6 hours. Gap has been <12 x 2, so patient can be transitioned this evening. Patient will require basal insulin 2 hours prior to discontinuing the drip - based on home insulin dose, suggest at least 30 units of Lantus. DTC will see patient tomorrow. Chart reviewed on [address]. Patient is 24 y.o. female    Hx Type 1 Diabetes on intensive insulin injection program (Lantus 38 units, Novolog 7 units tid ac) at home per H&P. A1c:   Lab Results   Component Value Date/Time    Hemoglobin A1c 10.8 (H) 02/28/2018 10:44 PM         Recent Glucose Results:   Lab Results   Component Value Date/Time     (H) 03/01/2018 01:48 PM     (H) 03/01/2018 09:24 AM     (H) 03/01/2018 04:08 AM    GLUCPOC 223 (H) 03/01/2018 03:23 PM    GLUCPOC 236 (H) 03/01/2018 01:18 PM    GLUCPOC 259 (H) 03/01/2018 12:18 PM        Lab Results   Component Value Date/Time    Creatinine 0.80 03/01/2018 01:48 PM       Active Orders   Diet    DIET DIABETIC CONSISTENT CARB Regular        PO intake: No data found. Will continue to follow as needed. Thank you.   Caleb Le, MS, RN, CDE

## 2018-03-01 NOTE — ED NOTES
Assumed care of patient. Verbal and bedside report received from JOSSUE Oliveira. Patient resting quietly on stretcher, visitor at bedside. Pt appears in no acute distress, respirations equal and unlabored. VS WNL. Call bell within reach.

## 2018-03-01 NOTE — ED NOTES
Spoke with Dr. Neela Maki regarding tamiflu and medication for headache/generalized pain. Per Dr. Neela Maki, pt to be on droplet precautions and orders received for pain medications. Pharmacy called regarding tamiflu. Tamiflu will be tubed shortly. Pt placed on droplet precautions.

## 2018-03-01 NOTE — ED NOTES
Lab notified this nurse that blood cultures drawn 2/28 were + for Gram negative rods in 2/4 bottles, will make attending aware.

## 2018-03-01 NOTE — ED NOTES
Pt complained of headache, Morphine PRN was offered, pt refused, Tylenol not due til 1803. Pt also complained of urinary burning. Pt stated that \"morphine is too expensive. \"

## 2018-03-01 NOTE — ED NOTES
Pt currently resting on stretcher and has c/o of 8/10 headache. Pt also c/o of lower back and bilateral leg cramping. BG checked and insulin drip adjusted. Call bell within reach. Will continue to monitor.

## 2018-03-01 NOTE — ED NOTES
Assumed care of patient at this time and have checked patient's blood sugar and adjusted the rate of her insulin per glucomander. Patient is resting comfortably in bed at this time with no complaints. Will continue to monitor closely. CM x 2. Call bell within reach of patient.

## 2018-03-01 NOTE — ED NOTES
Dr. Angelica Waller notified of . Verbal orders received to DC NS infusion and start D5 0.9 NS @ 125 mLs an hour.

## 2018-03-01 NOTE — ED NOTES
Bedside and Verbal shift change report given to Shasta santiago RN (oncoming nurse) by Loren Solorio RN (offgoing nurse). Report included the following information SBAR, ED Summary, MAR and Recent Results. Pt ambulatory to ED room 27 with steady gait and placed on hospital bed.

## 2018-03-01 NOTE — ED NOTES
Bedside and Verbal shift change report received from Raymon Mazariegos RN and Melinda Harris RN (offgoing nurse). Report included the following information SBAR, ED Summary, MAR and Recent Results.

## 2018-03-01 NOTE — ED NOTES
Spoke with Dr. Tamela Snell regarding insulin drip and anion gap. Per Dr. Tamela Snell, continue DKA protocol.

## 2018-03-01 NOTE — PROGRESS NOTES
Admission Medication Reconciliation:    Information obtained from:  Patient, chart review    Comments/Recommendations: All medications/allergies have been reviewed and updated; the patient was an excellent historian and could recall names and doses of medications. Changes made to Prior to Admission (PTA) Medication List:   ?   Medications Added:   - Simvastatin, APAP, Tamiflu, diphenhydramine, Nyquil, Naproxen   ? Medications Changed:   - Changed insulin from Novolog to Humalog  - updated dose of ergocalciferol   ? Medications Removed:   - Biotin, ferrous sulfate, glucagon, mupirocin, non-medication items (I.e. Lancets)       Significant PMH/Disease States:   Past Medical History:   Diagnosis Date         Diabetes mellitus        Chief Complaint for this Admission:    Chief Complaint   Patient presents with    Fever    Vomiting    Shortness of Breath    Abdominal Pain    Flank Pain       Allergies:  Review of patient's allergies indicates no known allergies. Prior to Admission Medications:   Prior to Admission Medications   Prescriptions Last Dose Informant Patient Reported? Taking? ERGOCALCIFEROL, VITAMIN D2, (VITAMIN D PO)   Yes No   Sig: Take 1,000 Units by mouth. acetaminophen (TYLENOL) 500 mg tablet 2018 at Unknown time  Yes Yes   Sig: Take 1,000 mg by mouth every six (6) hours as needed for Pain or Fever. aspirin 81 mg tablet   Yes No   Sig: Take 81 mg by mouth. diphenhydrAMINE (BENADRYL) 25 mg capsule   Yes Yes   Sig: Take 50 mg by mouth every six (6) hours as needed for Itching or Sleep. doxylamine-dm-acetaminophen (VICKS NYQUIL COLD/FLU LIQUICAP) 6.25- mg cap   Yes Yes   Sig: Take 1 Cap by mouth nightly. insulin glargine (LANTUS,BASAGLAR) 100 unit/mL (3 mL) inpn   No No   Si units daily   Patient taking differently: 38 Units by SubCUTAneous route nightly.  38 units daily   insulin lispro (HUMALOG U-100 INSULIN) 100 unit/mL injection   Yes Yes   Sig: by SubCUTAneous route. Sliding scale based on food   naproxen (NAPROSYN) 250 mg tablet   Yes Yes   Sig: Take 500 mg by mouth two (2) times daily as needed for Pain or Fever. oseltamivir (TAMIFLU) 75 mg capsule 2/28/2018 at Unknown time  Yes Yes   Sig: Take 75 mg by mouth two (2) times a day. simvastatin (ZOCOR) 20 mg tablet   Yes Yes   Sig: Take 10 mg by mouth nightly. Facility-Administered Medications: None     Thank you for allowing pharmacy to participate in the coordination of this patient's care. If you have any other questions, please contact the medication reconciliation pharmacist at [phone]. Kevin Landaverde, Pharm. D.

## 2018-03-01 NOTE — H&P
[address] [facility]  ACUTE CARE HISTORY AND PHYSICAL    Yolanda LÓPEZ  MR#: [id number]  : 1990  ACCOUNT #: [de-identified]   DATE OF SERVICE: 2018    CHIEF COMPLAINT:  Vomiting. HISTORY OF PRESENT ILLNESS:  The patient is a 68-year-old female with past medical history of insulin-dependent diabetes mellitus, who presents to the hospital complaining of the above mentioned symptoms. The patient reports her symptoms started about two days back. The patient reports she started experiencing some body aches, fevers and generalized malaise, went to urgent care yesterday and had a flu test done which was essentially negative, but was told that she may have influenza and was started on Tamiflu. The patient reports through the night she had multiple bouts of vomiting, felt more fatigued, started having some \"fast breathing,\" got concerned and decided to come to the hospital.  The patient was found to be in DKA and was requested to be admitted under the hospitalist service. The patient reports that she had a headache initially but currently denies any headache, denies any blurry vision, photophobia, any trouble swallowing or trouble with speech, any neck pain, any meningeal signs chest pain, shortness of breath, cough, fever, chills, abdominal pain, constipation, diarrhea, urinary symptoms, focal neurological weakness, recent travel, sick contacts, falls, injuries, hematemesis, melena or hemoptysis, or any other concerns or problems. PAST MEDICAL HISTORY:  See above. The patient reports that she had medical termination of pregnancy in 2017. HOME MEDICATIONS:  Currently, the patient is on insulin NovoLog 7 units with meals and Lantus 38 units daily, aspirin 81 mg daily. SOCIAL HISTORY:  The patient is a former smoker, used to smoke, quit in 2018, drinks two to three beers on a daily basis, denies IV drug abuse, lives at home.     FAMILY HISTORY:  Discussed and found to be noncontributory. Maternal grandfather had history of cancer. REVIEW OF SYSTEMS:  All systems were reviewed and found to be essentially negative except for the symptoms mentioned above. ALLERGIES:  NO KNOWN DRUG ALLERGIES. PHYSICAL EXAMINATION:  VITAL SIGNS:  Temperature 98.1, pulse 112, respiratory rate 18, blood pressure 132/79, pulse ox 99% on room air. GENERAL:  Alert x3, awake, mildly distress, pleasant female, appears to be stated age. HEENT:  Pupils equally reactive to light, dry mucous membranes, tympanic membranes clear. NECK:  Supple. CHEST:  Clear to auscultation bilaterally. HEART:  S1, S2 heard. ABDOMEN:  Soft, nontender, nondistended. Bowel sounds are physiologic. EXTREMITIES:  No clubbing, no cyanosis, no edema. NEUROPSYCHOLOGIC:  Pleasant mood and affect, cranial nerves II-XII grossly intact, sensory grossly within normal limits, DTRs 2+, strength 5/5. SKIN:  Warm. LABORATORY DATA:  White count 20.8, hemoglobin 16.5, hematocrit 49.6, platelets 956. Urine shows no signs of infection. Sodium 139, potassium 5, chloride 99, bicarbonate 9, anion gap 23, glucose 487, BUN 18, creatinine 1.18, calcium 9.8, bilirubin total 0.4, albumin 3.8, ALT 23, AST 18, alkaline phosphatase 205, lipase 48. Venous blood gas shows pH of 7.101, pCO2 of 24.7, pO2 of 27. Ultrasound of the abdomen shows mild increased right renal cortical echogenicity, suggesting medical renal disease, correlate clinically, probable small hepatic hemangioma. Gallbladder appears normal.  No sonographic evidence of acute cholecystitis or cholelithiasis. ASSESSMENT AND PLAN:  1. Diabetic ketoacidosis. The patient has been admitted to the critical care unit. We will start the patient on aggressive IV hydration, insulin GTT, Accu-Cheks per protocol, BMP every four hours. Once blood glucose gets better controlled, we will switch the patient to D5.   Once anion gap closes, we will switch the patient to scheduled insulin. Until then, we will keep the patient n.p.o. We will provide supportive care with pain control and continue to closely monitor. Further intervention will be per hospital course. Reassess as needed. 2.  Leukocytosis, unclear etiology, possible hemoconcentration. We will start the patient on aggressive IV hydration. Blood cultures have been sent. A chest x-ray has been requested. We will continue to closely monitor. 3.  Pseudohyponatremia. We will provide IV hydration. Repeat labs and bump in the sodium level is mostly secondary to hypoglycemia. 4.  Possible flu. The patient clinically appears to have flu. We will start the patient on Tamiflu. We will get respiratory PCR, and further intervention will be per hospital course. Continue to monitor. 5.  Gastrointestinal and deep venous thrombosis prophylaxes. The patient will be on sequential compression devices.       Alonso Flor MD MM / MN  D: 02/28/2018 20:52     T: 02/28/2018 22:23  JOB #: [id number]

## 2018-03-01 NOTE — ED NOTES
Pt continues to c/o of dull headache. Dr. Mcclain Come notified. Orders received for 1 mg Morphine IV.

## 2018-03-01 NOTE — ED NOTES
Pt temp at 100.3. Hospitalist pagevictorino. Spoke with Dr. Tyrese Ornelas and telephone orders received for oral tylenol. Pt NPO but per Dr. Tyrese Ornelas ok to have oral meds.

## 2018-03-01 NOTE — ED NOTES
TRANSFER - OUT REPORT:    Verbal report given to Dharmesh Dotson RN (name) on [facility]  being transferred to [facility] (unit) for routine progression of care       Report consisted of patients Situation, Background, Assessment and   Recommendations(SBAR). Information from the following report(s) SBAR, Kardex, ED Summary, Intake/Output, MAR and Recent Results was reviewed with the receiving nurse. Lines:   Peripheral IV 02/28/18 Right Antecubital (Active)   Site Assessment Clean, dry, & intact 3/1/2018 12:00 PM   Phlebitis Assessment 0 3/1/2018 12:00 PM   Infiltration Assessment 0 3/1/2018 12:00 PM   Dressing Status Clean, dry, & intact 3/1/2018 12:00 PM   Dressing Type Tape;Transparent 3/1/2018 12:00 PM   Hub Color/Line Status Pink; Infusing 3/1/2018 12:00 PM   Action Taken Open ports on tubing capped 3/1/2018 12:00 PM   Alcohol Cap Used Yes 3/1/2018 12:00 PM       Peripheral IV 02/28/18 Left Wrist (Active)   Site Assessment Clean, dry, & intact 3/1/2018 12:00 PM   Phlebitis Assessment 0 3/1/2018 12:00 PM   Infiltration Assessment 0 3/1/2018 12:00 PM   Dressing Status Clean, dry, & intact 3/1/2018 12:00 PM   Dressing Type Tape;Transparent 3/1/2018 12:00 PM   Hub Color/Line Status Blue; Infusing 3/1/2018 12:00 PM   Action Taken Open ports on tubing capped 3/1/2018 12:00 PM   Alcohol Cap Used Yes 3/1/2018 12:00 PM        Opportunity for questions and clarification was provided.       Patient transported with:   Monitor  Registered Nurse

## 2018-03-01 NOTE — ED NOTES
Bedside and Verbal shift change report given to Roxanne RN (oncoming nurse) by Joey Garcia RN (offgoing nurse). Report included the following information ED Summary.

## 2018-03-01 NOTE — ED NOTES
Pt appears more lively. Temperature rechecked and wnl. Pt also reports that headache is not as severe. Will continue to monitor.

## 2018-03-01 NOTE — PROGRESS NOTES
Day # 1 of Zosyn  Indication:  Bloodstream infection  Abx regimen: Zosyn monotherapy at this time, 3.375 g Q8h  Recent Labs      18   0924  18   0408  18   2244  18   1712   WBC   --   16.0*   --   20.8*   CREA  0.65  0.83  0.93  1.18*   BUN  14  16  17  18     Est CrCl: 116.8  ml/min  Temp (24hrs), Av.8 °F (37.1 °C), Min:98.1 °F (36.7 °C), Max:100.3 °F (37.9 °C)    Cultures:   2018 Blood cx: Apparent GNR growing in 2/4 bottles drawn (site: R wrist and R AC)   2108 Respiratory panel  2108 Urine cx  RSV: (-)      Plan: Change to 3.375g x 1 followed by 10.125 g continuous infusion per policy

## 2018-03-01 NOTE — PROGRESS NOTES
Hospitalist Progress Note  Laurin Romberg, MD  Answering service: [phone] OR [phone] from in house phone        Date of Service:  3/1/2018  NAME:  Nishant Duarte  :  1990  MRN:  [id number]      Admission Summary:   19-year-old female with past medical history of insulin-dependent diabetes mellitus, who presents to the hospital complaining of the above mentioned symptoms. The patient reports her symptoms started about two days back. Interval history / Subjective:       Patient has gram negative bacteremia , start zosyn  Feels better overall , still complains of pain in flanks      Assessment & Plan:       Sepsis due to Gram negative bacteremia   Blood culture is positive for GNR , started on zosyn ,   Clinically has pyelonephritis m will get CT scan to rule out abscess   Add urine culture   Stop tamilfu as patient doesnot have flu . Pyelonephritis  See above , started on iv antibiotics     DKA   On insulin gtt per protocol   Bmp every 4 . Likely transition to lantus in am     hypokalemia   Replace iv     IDDM . On lantus at home    6 week preg terminated in dec   Bleeding now stopped       Code status: full   DVT prophylaxis: lovenox     Care Plan discussed with: Patient/Family  Disposition: TBD     Hospital Problems  Date Reviewed: 2018          Codes Class Noted POA    * (Principal)[facility] ICD-10-CM: E13.10  ICD-9-CM: 250.10  2018 Unknown                Review of Systems:   A comprehensive review of systems was negative except for that written in the HPI. Vital Signs:    Last 24hrs VS reviewed since prior progress note.  Most recent are:  Visit Vitals    /69 (BP 1 Location: Left arm, BP Patient Position: At rest)    Pulse 91    Temp 98.4 °F (36.9 °C)    Resp 18    Ht 5' 4.5\" (1.638 m)    Wt 59.7 kg (131 lb 9.6 oz)    SpO2 100%    BMI 22.24 kg/m2         Intake/Output Summary (Last 24 hours) at 03/01/18 1344  Last data filed at 03/01/18 1200   Gross per 24 hour   Intake          2024.01 ml   Output                0 ml   Net          2024.01 ml        Physical Examination:             Constitutional:  No acute distress, cooperative, pleasant    ENT:  Oral mucous moist, oropharynx benign. Neck supple,    Resp:  CTA bilaterally. No wheezing/rhonchi/rales. No accessory muscle use   CV:  Regular rhythm, normal rate, no murmurs, gallops, rubs    GI:  Soft, non distended, non tender. normoactive bowel sounds, no hepatosplenomegaly ,flank tenderness=    Musculoskeletal:  No edema, warm, 2+ pulses throughout    Neurologic:  Moves all extremities. AAOx3, CN II-XII reviewed            Data Review:    Review and/or order of clinical lab test      Labs:     Recent Labs      03/01/18   0408  02/28/18   1712   WBC  16.0*  20.8*   HGB  13.2  16.5*   HCT  39.3  49.6*   PLT  271  298     Recent Labs      03/01/18   0924  03/01/18   0408  02/28/18   2244   NA  139  136  136   K  3.3*  3.7  4.0   CL  109*  107  106   CO2  19*  15*  13*   BUN  14  16  17   CREA  0.65  0.83  0.93   GLU  184*  199*  172*   CA  8.3*  8.6  8.9   MG  1.8  2.0  2.1   PHOS   --    --   1.7*     Recent Labs      02/28/18   1712   SGOT  18   ALT  23   AP  205*   TBILI  0.4   TP  9.5*   ALB  3.8   GLOB  5.7*   LPSE  48*     No results for input(s): INR, PTP, APTT in the last 72 hours. No lab exists for component: INREXT   No results for input(s): FE, TIBC, PSAT, FERR in the last 72 hours. Lab Results   Component Value Date/Time    Folate >20.0 02/22/2017 09:40 AM      No results for input(s): PH, PCO2, PO2 in the last 72 hours. No results for input(s): CPK, CKNDX, TROIQ in the last 72 hours.     No lab exists for component: CPKMB  Lab Results   Component Value Date/Time    Cholesterol, total 189 02/22/2017 09:40 AM    HDL Cholesterol 85 02/22/2017 09:40 AM    LDL, calculated 95 02/22/2017 09:40 AM    Triglyceride 46 02/22/2017 09:40 AM     Lab Results   Component Value Date/Time    Glucose (POC) 236 (H) 03/01/2018 01:18 PM    Glucose (POC) 259 (H) 03/01/2018 12:18 PM    Glucose (POC) 165 (H) 03/01/2018 10:10 AM    Glucose (POC) 175 (H) 03/01/2018 08:01 AM    Glucose (POC) 192 (H) 03/01/2018 07:13 AM     Lab Results   Component Value Date/Time    Color YELLOW/STRAW 02/28/2018 06:06 PM    Appearance CLEAR 02/28/2018 06:06 PM    Specific gravity 1.026 02/28/2018 06:06 PM    pH (UA) 5.0 02/28/2018 06:06 PM    Protein 100 (A) 02/28/2018 06:06 PM    Glucose >1000 (A) 02/28/2018 06:06 PM    Ketone >80 (A) 02/28/2018 06:06 PM    Bilirubin NEGATIVE  02/28/2018 06:06 PM    Urobilinogen 0.2 02/28/2018 06:06 PM    Nitrites NEGATIVE  02/28/2018 06:06 PM    Leukocyte Esterase NEGATIVE  02/28/2018 06:06 PM    Epithelial cells MODERATE (A) 02/28/2018 06:06 PM    Bacteria NEGATIVE  02/28/2018 06:06 PM    WBC 0-4 02/28/2018 06:06 PM    RBC 0-5 02/28/2018 06:06 PM         Medications Reviewed:     Current Facility-Administered Medications   Medication Dose Route Frequency    dextrose 5% - LR with KCl 20 mEq/L infusion   IntraVENous CONTINUOUS    piperacillin-tazobactam (ZOSYN) 3.375 g in 0.9% sodium chloride (MBP/ADV) 100 mL  3.375 g IntraVENous ONCE    And    piperacillin-tazobactam (ZOSYN) 10.125 g in  mL continuous 24 hr infusion  10.125 g IntraVENous Q24H    glucose chewable tablet 16 g  4 Tab Oral PRN    dextrose (D50W) injection syrg 12.5-25 g  12.5-25 g IntraVENous PRN    glucagon (GLUCAGEN) injection 1 mg  1 mg IntraMUSCular PRN    sodium chloride (NS) flush 5-10 mL  5-10 mL IntraVENous Q8H    sodium chloride (NS) flush 5-10 mL  5-10 mL IntraVENous PRN    insulin lispro (HUMALOG) injection   SubCUTAneous TIDAC    insulin regular (NOVOLIN R, HUMULIN R) 100 Units in 0.9% sodium chloride 100 mL infusion  0-50 Units/hr IntraVENous TITRATE    ondansetron (ZOFRAN) injection 4 mg  4 mg IntraVENous Q4H PRN    morphine injection 1 mg  1 mg IntraVENous Q4H PRN    acetaminophen (TYLENOL) tablet 650 mg  650 mg Oral Q6H PRN     Current Outpatient Prescriptions   Medication Sig    insulin lispro (HUMALOG U-100 INSULIN) 100 unit/mL injection by SubCUTAneous route. Sliding scale based on food    simvastatin (ZOCOR) 20 mg tablet Take 10 mg by mouth nightly.  acetaminophen (TYLENOL) 500 mg tablet Take 1,000 mg by mouth every six (6) hours as needed for Pain or Fever.  oseltamivir (TAMIFLU) 75 mg capsule Take 75 mg by mouth two (2) times a day.  diphenhydrAMINE (BENADRYL) 25 mg capsule Take 50 mg by mouth every six (6) hours as needed for Itching or Sleep.  doxylamine-dm-acetaminophen (VICKS NYQUIL COLD/FLU LIQUICAP) 6.25- mg cap Take 1 Cap by mouth nightly.  naproxen (NAPROSYN) 250 mg tablet Take 500 mg by mouth two (2) times daily as needed for Pain or Fever.  insulin glargine (LANTUS,BASAGLAR) 100 unit/mL (3 mL) inpn 38 units daily (Patient taking differently: 38 Units by SubCUTAneous route nightly. 38 units daily)    ERGOCALCIFEROL, VITAMIN D2, (VITAMIN D PO) Take 1,000 Units by mouth.     aspirin 81 mg tablet Take 81 mg by mouth.     ______________________________________________________________________  EXPECTED LENGTH OF STAY: 3d 19h  ACTUAL LENGTH OF STAY:          1                 Laurin Romberg, MD

## 2018-03-02 LAB
ADMINISTERED INITIALS, ADMINIT: NORMAL
ANION GAP SERPL CALC-SCNC: 10 MMOL/L (ref 5–15)
ANION GAP SERPL CALC-SCNC: 9 MMOL/L (ref 5–15)
ANION GAP SERPL CALC-SCNC: 9 MMOL/L (ref 5–15)
BACTERIA SPEC CULT: NORMAL
BASOPHILS # BLD: 0 K/UL (ref 0–0.1)
BASOPHILS NFR BLD: 0 % (ref 0–1)
BUN SERPL-MCNC: 5 MG/DL (ref 6–20)
BUN/CREAT SERPL: 11 (ref 12–20)
CALCIUM SERPL-MCNC: 8.2 MG/DL (ref 8.5–10.1)
CALCIUM SERPL-MCNC: 8.6 MG/DL (ref 8.5–10.1)
CALCIUM SERPL-MCNC: 8.7 MG/DL (ref 8.5–10.1)
CC UR VC: NORMAL
CHLORIDE SERPL-SCNC: 106 MMOL/L (ref 97–108)
CHLORIDE SERPL-SCNC: 107 MMOL/L (ref 97–108)
CHLORIDE SERPL-SCNC: 107 MMOL/L (ref 97–108)
CO2 SERPL-SCNC: 23 MMOL/L (ref 21–32)
CO2 SERPL-SCNC: 23 MMOL/L (ref 21–32)
CO2 SERPL-SCNC: 24 MMOL/L (ref 21–32)
CREAT SERPL-MCNC: 0.44 MG/DL (ref 0.55–1.02)
CREAT SERPL-MCNC: 0.46 MG/DL (ref 0.55–1.02)
CREAT SERPL-MCNC: 0.47 MG/DL (ref 0.55–1.02)
D50 ADMINISTERED, D50ADM: 0 ML
D50 ORDER, D50ORD: 0 ML
DIFFERENTIAL METHOD BLD: ABNORMAL
EOSINOPHIL # BLD: 0.1 K/UL (ref 0–0.4)
EOSINOPHIL NFR BLD: 2 % (ref 0–7)
ERYTHROCYTE [DISTWIDTH] IN BLOOD BY AUTOMATED COUNT: 12.2 % (ref 11.5–14.5)
GLSCOM COMMENTS: NORMAL
GLUCOSE BLD STRIP.AUTO-MCNC: 163 MG/DL (ref 65–100)
GLUCOSE BLD STRIP.AUTO-MCNC: 168 MG/DL (ref 65–100)
GLUCOSE BLD STRIP.AUTO-MCNC: 176 MG/DL (ref 65–100)
GLUCOSE BLD STRIP.AUTO-MCNC: 176 MG/DL (ref 65–100)
GLUCOSE BLD STRIP.AUTO-MCNC: 183 MG/DL (ref 65–100)
GLUCOSE BLD STRIP.AUTO-MCNC: 184 MG/DL (ref 65–100)
GLUCOSE BLD STRIP.AUTO-MCNC: 187 MG/DL (ref 65–100)
GLUCOSE BLD STRIP.AUTO-MCNC: 197 MG/DL (ref 65–100)
GLUCOSE BLD STRIP.AUTO-MCNC: 239 MG/DL (ref 65–100)
GLUCOSE BLD STRIP.AUTO-MCNC: 262 MG/DL (ref 65–100)
GLUCOSE SERPL-MCNC: 145 MG/DL (ref 65–100)
GLUCOSE SERPL-MCNC: 184 MG/DL (ref 65–100)
GLUCOSE SERPL-MCNC: 189 MG/DL (ref 65–100)
GLUCOSE, GLC: 163 MG/DL
GLUCOSE, GLC: 168 MG/DL
GLUCOSE, GLC: 176 MG/DL
GLUCOSE, GLC: 183 MG/DL
GLUCOSE, GLC: 184 MG/DL
GLUCOSE, GLC: 187 MG/DL
GLUCOSE, GLC: 197 MG/DL
GLUCOSE, GLC: 239 MG/DL
HCT VFR BLD AUTO: 36.1 % (ref 35–47)
HGB BLD-MCNC: 12.7 G/DL (ref 11.5–16)
HIGH TARGET, HITG: 250 MG/DL
IMM GRANULOCYTES # BLD: 0 K/UL
IMM GRANULOCYTES NFR BLD AUTO: 0 %
INSULIN ADMINSTERED, INSADM: 2.1 UNITS/HOUR
INSULIN ADMINSTERED, INSADM: 2.2 UNITS/HOUR
INSULIN ADMINSTERED, INSADM: 2.3 UNITS/HOUR
INSULIN ADMINSTERED, INSADM: 2.5 UNITS/HOUR
INSULIN ADMINSTERED, INSADM: 2.7 UNITS/HOUR
INSULIN ADMINSTERED, INSADM: 3.6 UNITS/HOUR
INSULIN ORDER, INSORD: 2.1 UNITS/HOUR
INSULIN ORDER, INSORD: 2.2 UNITS/HOUR
INSULIN ORDER, INSORD: 2.3 UNITS/HOUR
INSULIN ORDER, INSORD: 2.5 UNITS/HOUR
INSULIN ORDER, INSORD: 2.7 UNITS/HOUR
INSULIN ORDER, INSORD: 3.6 UNITS/HOUR
LOW TARGET, LOT: 150 MG/DL
LYMPHOCYTES # BLD: 0.6 K/UL (ref 0.8–3.5)
LYMPHOCYTES NFR BLD: 10 % (ref 12–49)
MAGNESIUM SERPL-MCNC: 1.8 MG/DL (ref 1.6–2.4)
MAGNESIUM SERPL-MCNC: 1.9 MG/DL (ref 1.6–2.4)
MAGNESIUM SERPL-MCNC: 2 MG/DL (ref 1.6–2.4)
MCH RBC QN AUTO: 32.9 PG (ref 26–34)
MCHC RBC AUTO-ENTMCNC: 35.2 G/DL (ref 30–36.5)
MCV RBC AUTO: 93.5 FL (ref 80–99)
MINUTES UNTIL NEXT BG, NBG: 120 MIN
MINUTES UNTIL NEXT BG, NBG: 60 MIN
MONOCYTES # BLD: 0.4 K/UL (ref 0–1)
MONOCYTES NFR BLD: 7 % (ref 5–13)
MULTIPLIER, MUL: 0.02
NEUTS BAND NFR BLD MANUAL: 10 % (ref 0–6)
NEUTS SEG # BLD: 5.3 K/UL (ref 1.8–8)
NEUTS SEG NFR BLD: 71 % (ref 32–75)
NRBC # BLD: 0 K/UL (ref 0–0.01)
NRBC BLD-RTO: 0 PER 100 WBC
ORDER INITIALS, ORDINIT: NORMAL
PLATELET # BLD AUTO: 228 K/UL (ref 150–400)
PMV BLD AUTO: 9.7 FL (ref 8.9–12.9)
POTASSIUM SERPL-SCNC: 3.1 MMOL/L (ref 3.5–5.1)
POTASSIUM SERPL-SCNC: 3.2 MMOL/L (ref 3.5–5.1)
POTASSIUM SERPL-SCNC: 3.3 MMOL/L (ref 3.5–5.1)
RBC # BLD AUTO: 3.86 M/UL (ref 3.8–5.2)
RBC MORPH BLD: ABNORMAL
SERVICE CMNT-IMP: ABNORMAL
SERVICE CMNT-IMP: NORMAL
SODIUM SERPL-SCNC: 138 MMOL/L (ref 136–145)
SODIUM SERPL-SCNC: 140 MMOL/L (ref 136–145)
SODIUM SERPL-SCNC: 140 MMOL/L (ref 136–145)
WBC # BLD AUTO: 6.4 K/UL (ref 3.6–11)

## 2018-03-02 PROCEDURE — 83735 ASSAY OF MAGNESIUM: CPT | Performed by: FAMILY MEDICINE

## 2018-03-02 PROCEDURE — 74011250637 HC RX REV CODE- 250/637: Performed by: INTERNAL MEDICINE

## 2018-03-02 PROCEDURE — 74011636637 HC RX REV CODE- 636/637: Performed by: HOSPITALIST

## 2018-03-02 PROCEDURE — 74011250636 HC RX REV CODE- 250/636: Performed by: INTERNAL MEDICINE

## 2018-03-02 PROCEDURE — 36415 COLL VENOUS BLD VENIPUNCTURE: CPT | Performed by: FAMILY MEDICINE

## 2018-03-02 PROCEDURE — 82962 GLUCOSE BLOOD TEST: CPT

## 2018-03-02 PROCEDURE — 80048 BASIC METABOLIC PNL TOTAL CA: CPT | Performed by: FAMILY MEDICINE

## 2018-03-02 PROCEDURE — 65270000032 HC RM SEMIPRIVATE

## 2018-03-02 PROCEDURE — 74011250637 HC RX REV CODE- 250/637: Performed by: HOSPITALIST

## 2018-03-02 PROCEDURE — 87040 BLOOD CULTURE FOR BACTERIA: CPT | Performed by: INTERNAL MEDICINE

## 2018-03-02 PROCEDURE — 74011636637 HC RX REV CODE- 636/637: Performed by: FAMILY MEDICINE

## 2018-03-02 PROCEDURE — 74011000258 HC RX REV CODE- 258: Performed by: FAMILY MEDICINE

## 2018-03-02 PROCEDURE — 85025 COMPLETE CBC W/AUTO DIFF WBC: CPT | Performed by: INTERNAL MEDICINE

## 2018-03-02 RX ORDER — INSULIN LISPRO 100 [IU]/ML
INJECTION, SOLUTION INTRAVENOUS; SUBCUTANEOUS
Status: DISCONTINUED | OUTPATIENT
Start: 2018-03-02 | End: 2018-03-02

## 2018-03-02 RX ORDER — DOCUSATE SODIUM 100 MG/1
100 CAPSULE, LIQUID FILLED ORAL 2 TIMES DAILY
Status: DISCONTINUED | OUTPATIENT
Start: 2018-03-02 | End: 2018-03-05 | Stop reason: HOSPADM

## 2018-03-02 RX ORDER — POTASSIUM CHLORIDE 750 MG/1
40 TABLET, FILM COATED, EXTENDED RELEASE ORAL
Status: COMPLETED | OUTPATIENT
Start: 2018-03-02 | End: 2018-03-02

## 2018-03-02 RX ORDER — MAGNESIUM SULFATE 100 %
4 CRYSTALS MISCELLANEOUS AS NEEDED
Status: DISCONTINUED | OUTPATIENT
Start: 2018-03-02 | End: 2018-03-05 | Stop reason: HOSPADM

## 2018-03-02 RX ORDER — INSULIN LISPRO 100 [IU]/ML
INJECTION, SOLUTION INTRAVENOUS; SUBCUTANEOUS
Status: COMPLETED | OUTPATIENT
Start: 2018-03-02 | End: 2018-03-02

## 2018-03-02 RX ORDER — DEXTROSE 50 % IN WATER (D50W) INTRAVENOUS SYRINGE
12.5-25 AS NEEDED
Status: DISCONTINUED | OUTPATIENT
Start: 2018-03-02 | End: 2018-03-05 | Stop reason: HOSPADM

## 2018-03-02 RX ORDER — INSULIN GLARGINE 100 [IU]/ML
30 INJECTION, SOLUTION SUBCUTANEOUS DAILY
Status: DISCONTINUED | OUTPATIENT
Start: 2018-03-02 | End: 2018-03-03

## 2018-03-02 RX ORDER — INSULIN LISPRO 100 [IU]/ML
INJECTION, SOLUTION INTRAVENOUS; SUBCUTANEOUS
Status: DISCONTINUED | OUTPATIENT
Start: 2018-03-02 | End: 2018-03-05 | Stop reason: HOSPADM

## 2018-03-02 RX ADMIN — POTASSIUM CHLORIDE, SODIUM CHLORIDE, CALCIUM CHLORIDE, SODIUM LACTATE, AND DEXTROSE MONOHYDRATE: 1.79; 6; .2; 3.1; 5 INJECTION, SOLUTION INTRAVENOUS at 07:24

## 2018-03-02 RX ADMIN — ACETAMINOPHEN 650 MG: 325 TABLET ORAL at 15:41

## 2018-03-02 RX ADMIN — SODIUM CHLORIDE 2.7 UNITS/HR: 900 INJECTION, SOLUTION INTRAVENOUS at 04:10

## 2018-03-02 RX ADMIN — Medication 10 ML: at 22:00

## 2018-03-02 RX ADMIN — INSULIN LISPRO 2 UNITS: 100 INJECTION, SOLUTION INTRAVENOUS; SUBCUTANEOUS at 17:58

## 2018-03-02 RX ADMIN — INSULIN LISPRO 4 UNITS: 100 INJECTION, SOLUTION INTRAVENOUS; SUBCUTANEOUS at 08:42

## 2018-03-02 RX ADMIN — Medication 10 ML: at 07:24

## 2018-03-02 RX ADMIN — ACETAMINOPHEN 650 MG: 325 TABLET ORAL at 22:07

## 2018-03-02 RX ADMIN — SIMVASTATIN 10 MG: 10 TABLET, FILM COATED ORAL at 22:07

## 2018-03-02 RX ADMIN — ACETAMINOPHEN 650 MG: 325 TABLET ORAL at 08:45

## 2018-03-02 RX ADMIN — INSULIN GLARGINE 30 UNITS: 100 INJECTION, SOLUTION SUBCUTANEOUS at 10:24

## 2018-03-02 RX ADMIN — SODIUM CHLORIDE 2.5 UNITS/HR: 900 INJECTION, SOLUTION INTRAVENOUS at 05:15

## 2018-03-02 RX ADMIN — INSULIN LISPRO 2 UNITS: 100 INJECTION, SOLUTION INTRAVENOUS; SUBCUTANEOUS at 12:01

## 2018-03-02 RX ADMIN — PIPERACILLIN AND TAZOBACTAM 10.12 G: 3; .375 INJECTION, POWDER, FOR SOLUTION INTRAVENOUS at 13:24

## 2018-03-02 RX ADMIN — INSULIN LISPRO 3 UNITS: 100 INJECTION, SOLUTION INTRAVENOUS; SUBCUTANEOUS at 22:06

## 2018-03-02 RX ADMIN — POTASSIUM CHLORIDE 40 MEQ: 750 TABLET, EXTENDED RELEASE ORAL at 10:25

## 2018-03-02 RX ADMIN — ACETAMINOPHEN 650 MG: 325 TABLET ORAL at 02:40

## 2018-03-02 RX ADMIN — SODIUM CHLORIDE 2.2 UNITS/HR: 900 INJECTION, SOLUTION INTRAVENOUS at 01:22

## 2018-03-02 RX ADMIN — SODIUM CHLORIDE 2.1 UNITS/HR: 900 INJECTION, SOLUTION INTRAVENOUS at 07:23

## 2018-03-02 NOTE — PROGRESS NOTES
Spiritual Care Assessment/Progress Note  [facility]      NAME: Faisal Paulson      MRN: [id number]  AGE: 24 y.o. SEX: female  Latter-day Affiliation:    Language: English     3/2/2018     Total Time (in minutes): 5     Spiritual Assessment begun in [facility] 4 IMCU 2 through conversation with:         [x]Patient        [] Family    [x] Friend(s)        Reason for Consult: Initial/Spiritual assessment, patient floor     Spiritual beliefs: (Please include comment if needed)     [] Involved in a florencia tradition/spiritual practice:     [] Supported by a florencia community:      [] Claims no spiritual orientation:      [] Seeking spiritual identity:           [] Adheres to an individual form of spirituality:      [x] Not able to assess:                     Identified resources for coping:      [] Prayer                  [] Devotional reading               [] Music                  [] Guided Imagery     [x] Family/friends                 [] Pet visits     [] Other:        Interventions offered during this visit: (See comments for more details)    Patient Interventions: Initial/Spiritual assessment, patient floor           Plan of Care:     [] Discuss Spiritual/Cultural needs    [] Support AMD and/or advance care planning process      [] Support grieving process   [] Coordinate Rites/Rituals    [] Coordination with community clergy   [x] No spiritual needs identified at this time   [] Detailed Plan of Care below (See Comments)  [] Make referral to Music Therapy  [] Make referral to Pet Therapy     [] Make referral to Addiction services  [] Make referral to [facility]  [] Make referral to Spiritual Care Partner  [] No future visits requested             Comments: Pt and friend were in room. Both seem to be looking forward to discharge. Provided pastoral support and informed them of  availability.    Alanna Rosa, MACE   [phone]

## 2018-03-02 NOTE — PROGRESS NOTES
I returned page from nurse who reports patient has fever with T= 103.1 F (refractory to Tylenol which was already given). Ice packs will be applied but patient is still febrile. Blood cultures were collected within the last 24 hours, with growth of gram negative rods in 3 of 4 bottles. I have reviewed chart. Creatinine is normal.  Patient reported is not on any anticoagulants. She is SCDs ordered for VTE prophylaxis. Nurse reports no active bleeding issues. Urine pregnancy test was negative yesterday. There were no contraindications noted on chart records to NSAIDS. As such, I ordered Ibuprofen 600 mg po x 1 dose for fever.

## 2018-03-02 NOTE — PROGRESS NOTES
Problem: Diabetes Self-Management  Goal: *Monitoring blood glucose, interpreting and using results  Identify recommended blood glucose targets  and personal targets. Outcome: Progressing Towards Goal  Pt states \"I need to check my sugar before meals and before bedtime\"    Bedside shift change report given to Otis Halsted (oncoming nurse) by Darius Thakur (offgoing nurse). Report included the following information SBAR, Intake/Output, Recent Results and Cardiac Rhythm NSR.

## 2018-03-02 NOTE — ROUTINE PROCESS
Received telephone order from [address] [place] [address] for colace 100 mg BID for patient c/o constipation.

## 2018-03-02 NOTE — PROGRESS NOTES
Hospitalist Progress Note  Lisa Dillon MD  Answering service: [phone] OR [phone] from in house phone        Date of Service:  3/2/2018  NAME:  Wang Craft  :  1990  MRN:  [id number]      Admission Summary:   77-year-old female with past medical history of insulin-dependent diabetes mellitus, who presents to the hospital complaining of the above mentioned symptoms. The patient reports her symptoms started about two days back. Interval history / Subjective:       Pt feeling better today. Fever now low grade. Unclear source. Tm24hr 103.1 at 7pm last night. No resolution with tylenol. Did resolve after ibuprofen given. Denies n/v, manuel diet. To transition off insulin gtt to lantus this am. RN at bedside along with pt's boyfriend      Assessment & Plan:       Sepsis due to Gram negative bacteremia   -BCx  positive for GNR 3 of 4 , started on zosyn  yest   -repeat BCx 3/2 pend  -Clinically has pyelonephritis  -CT scanabd 3/1 b/l interstitial nephritis, no hydro/abscess  -UA  normal wbc. Pend UCx   - tamilfu already prev d/c as patient does not have flu . -c/s ID     Pyelonephritis  -iv zosyn    DKA ,resolved  -will wean off insulin gtt today 3/2 since AG closed and glucs improved  -start Lantus 30untis daily (takes lantus 38untis daily,primarily at night but pt states she sometimes forgets to raghav her insulin and takes in mornings in that instance)  -start SSI normal  -cont COH 15:1  -apprec DTC recs    hypokalemia   -replete again today    IDDM .    -On lantus at home    6 week preg terminated in dec   Bleeding now stopped       Code status: full   DVT prophylaxis: lovenox     Care Plan discussed with: Patient/Family  Disposition: TBD     Hospital Problems  Date Reviewed: 2018          Codes Class Noted POA    * (Principal)D[facility]diabetic [facility]acidoses) [facility] ICD-10-CM: E13.10  ICD-9-CM: 250.10  2018 Unknown Review of Systems:   A comprehensive review of systems was negative except for that written in the HPI. Vital Signs:    Last 24hrs VS reviewed since prior progress note. Most recent are:  Visit Vitals    /73 (BP 1 Location: Left arm, BP Patient Position: At rest;Supine)    Pulse (!) 106    Temp 99.2 °F (37.3 °C)    Resp 16    Ht 5' 4.5\" (1.638 m)    Wt 62.7 kg (138 lb 3.7 oz)    SpO2 99%    BMI 23.36 kg/m2         Intake/Output Summary (Last 24 hours) at 03/02/18 1630  Last data filed at 03/02/18 0410   Gross per 24 hour   Intake          4316.01 ml   Output              500 ml   Net          3816.01 ml        Physical Examination:             Constitutional:  No acute distress, cooperative, pleasant    ENT:  Oral mucous moist, oropharynx benign. Neck supple,    Resp:  CTA bilaterally. No wheezing/rhonchi/rales. No accessory muscle use   CV:  Regular rhythm, normal rate, no murmurs, gallops, rubs    GI:  Soft, non distended, non tender. normoactive bowel sounds, no hepatosplenomegaly ,flank tenderness    Musculoskeletal:  No edema, warm, 2+ pulses throughout    Neurologic:  Moves all extremities. AAOx3, CN II-XII reviewed            Data Review:    Review and/or order of clinical lab test      Labs:     Recent Labs      03/02/18   0242  03/01/18   0408   WBC  6.4  16.0*   HGB  12.7  13.2   HCT  36.1  39.3   PLT  228  271     Recent Labs      03/02/18   0242  03/01/18   2254  03/01/18   1844   02/28/18   2244   NA  140  138  136   < >  136   K  3.1*  3.3*  3.6   < >  4.0   CL  107  106  103   < >  106   CO2  23  23  26   < >  13*   BUN  5*  5*  9   < >  17   CREA  0.47*  0.44*  0.66   < >  0.93   GLU  189*  145*  267*   < >  172*   CA  8.2*  8.7  8.1*   < >  8.9   MG  1.8  2.0  1.6   < >  2.1   PHOS   --    --    --    --   1.7*    < > = values in this interval not displayed.      Recent Labs      02/28/18   1712   SGOT  18   ALT  23   AP  205*   TBILI  0.4   TP  9.5*   ALB  3.8   GLOB 5.7*   LPSE  48*     No results for input(s): INR, PTP, APTT in the last 72 hours. No lab exists for component: INREXT, INREXT   No results for input(s): FE, TIBC, PSAT, FERR in the last 72 hours. Lab Results   Component Value Date/Time    Folate >20.0 02/22/2017 09:40 AM      No results for input(s): PH, PCO2, PO2 in the last 72 hours. No results for input(s): CPK, CKNDX, TROIQ in the last 72 hours.     No lab exists for component: CPKMB  Lab Results   Component Value Date/Time    Cholesterol, total 189 02/22/2017 09:40 AM    HDL Cholesterol 85 02/22/2017 09:40 AM    LDL, calculated 95 02/22/2017 09:40 AM    Triglyceride 46 02/22/2017 09:40 AM     Lab Results   Component Value Date/Time    Glucose (POC) 163 (H) 03/02/2018 07:16 AM    Glucose (POC) 187 (H) 03/02/2018 05:12 AM    Glucose (POC) 197 (H) 03/02/2018 04:06 AM    Glucose (POC) 184 (H) 03/02/2018 02:45 AM    Glucose (POC) 183 (H) 03/02/2018 01:43 AM     Lab Results   Component Value Date/Time    Color YELLOW/STRAW 02/28/2018 06:06 PM    Appearance CLEAR 02/28/2018 06:06 PM    Specific gravity 1.026 02/28/2018 06:06 PM    pH (UA) 5.0 02/28/2018 06:06 PM    Protein 100 (A) 02/28/2018 06:06 PM    Glucose >1000 (A) 02/28/2018 06:06 PM    Ketone >80 (A) 02/28/2018 06:06 PM    Bilirubin NEGATIVE  02/28/2018 06:06 PM    Urobilinogen 0.2 02/28/2018 06:06 PM    Nitrites NEGATIVE  02/28/2018 06:06 PM    Leukocyte Esterase NEGATIVE  02/28/2018 06:06 PM    Epithelial cells MODERATE (A) 02/28/2018 06:06 PM    Bacteria NEGATIVE  02/28/2018 06:06 PM    WBC 0-4 02/28/2018 06:06 PM    RBC 0-5 02/28/2018 06:06 PM         Medications Reviewed:     Current Facility-Administered Medications   Medication Dose Route Frequency    dextrose 5% - LR with KCl 20 mEq/L infusion   IntraVENous CONTINUOUS    piperacillin-tazobactam (ZOSYN) 10.125 g in  mL continuous 24 hr infusion  10.125 g IntraVENous Q24H    simvastatin (ZOCOR) tablet 10 mg  10 mg Oral QHS    glucose chewable tablet 16 g  4 Tab Oral PRN    dextrose (D50W) injection syrg 12.5-25 g  12.5-25 g IntraVENous PRN    glucagon (GLUCAGEN) injection 1 mg  1 mg IntraMUSCular PRN    sodium chloride (NS) flush 5-10 mL  5-10 mL IntraVENous Q8H    sodium chloride (NS) flush 5-10 mL  5-10 mL IntraVENous PRN    insulin lispro (HUMALOG) injection   SubCUTAneous TIDAC    insulin regular (NOVOLIN R, HUMULIN R) 100 Units in 0.9% sodium chloride 100 mL infusion  0-50 Units/hr IntraVENous TITRATE    ondansetron (ZOFRAN) injection 4 mg  4 mg IntraVENous Q4H PRN    morphine injection 1 mg  1 mg IntraVENous Q4H PRN    acetaminophen (TYLENOL) tablet 650 mg  650 mg Oral Q6H PRN     ______________________________________________________________________  EXPECTED LENGTH OF STAY: 3d 19h  ACTUAL LENGTH OF STAY:          2                 Ankita Ordoñez MD

## 2018-03-02 NOTE — ROUTINE PROCESS
TRANSFER - OUT REPORT:    Verbal report given to JOSSUE Balderas(name) on Darian Farr  being transferred to (unit) for routine progression of care       Report consisted of patients Situation, Background, Assessment and   Recommendations(SBAR). Information from the following report(s) SBAR, Kardex, Intake/Output, MAR, Recent Results and Cardiac Rhythm NSR/ST was reviewed with the receiving nurse. Lines:   Peripheral IV 02/28/18 Right Antecubital (Active)   Site Assessment Clean, dry, & intact 3/2/2018  9:10 AM   Phlebitis Assessment 0 3/2/2018  9:10 AM   Infiltration Assessment 0 3/2/2018  9:10 AM   Dressing Status Clean, dry, & intact 3/2/2018  9:10 AM   Dressing Type Transparent 3/2/2018  9:10 AM   Hub Color/Line Status Pink; Infusing 3/2/2018  9:10 AM   Action Taken Open ports on tubing capped 3/2/2018  9:10 AM   Alcohol Cap Used Yes 3/2/2018  9:10 AM       Peripheral IV 02/28/18 Left Wrist (Active)   Site Assessment Clean, dry, & intact 3/2/2018  9:10 AM   Phlebitis Assessment 0 3/2/2018  9:10 AM   Infiltration Assessment 0 3/2/2018  9:10 AM   Dressing Status Clean, dry, & intact 3/2/2018  9:10 AM   Dressing Type Transparent 3/2/2018  9:10 AM   Hub Color/Line Status Blue 3/2/2018  9:10 AM   Action Taken Open ports on tubing capped 3/2/2018  9:10 AM   Alcohol Cap Used Yes 3/2/2018  9:10 AM        Opportunity for questions and clarification was provided.       Patient transported with:   Tech    Nurse stated that the room is not ready and 5 W will call when room is ready

## 2018-03-02 NOTE — ROUTINE PROCESS
Attempted to call report, charge will be taking this patient but she is currently in another patient's room.

## 2018-03-02 NOTE — DIABETES MGMT
DTC Consult Note    Recommendations/ Comments: Per patient, she takes Lispro 7 units plus correction scale at home. If appropriate, please consider:   - Adding Lispro pre-meal insulin, 4 units tid ac    Current hospital DM medication: Lantus 30 units, Lispro correction scale, normal sensitivity    Consult received for:       [x]             Insulin infusion     [x]             DKA/HHS    Chart reviewed and initial evaluation complete on [address]. Patient is a 24 y.o. female with a history of Type 1 Diabetes on intensive insulin injection program (Lantus 38 units and Humalog scale) at home. Patient reports that she has not been doing what she should recently and wasn't following her \"sclae\". After further questioning, she stated that she has not been checking her blood sugar, which would make using a scale impossible. She also states that she has not been following a meal plan. Offered outpatient education multiple times, but she declined, stating that she wants to see an endocrinologist.  Informed her that she could most likely get in quicker with DTC, but she wants to wait. Assessed and instructed patient on the following:   ·  interpretation of lab results, blood sugar goals, complications of diabetes mellitus, hypoglycemia prevention and treatment, insulin adjustments and nutrition    Encouraged the following:   · dietary modifications: no concentrated sweets, regular blood sugar monitoring: 3-4 times daily    Provided patient with the following: [x]             Survival skills education materials               [x]             Outpatient DTC contact number    Discussed with patient and/or family need for follow up appointment for diabetes management after discharge.       A1c:   Lab Results   Component Value Date/Time    Hemoglobin A1c 10.8 (H) 02/28/2018 10:44 PM       Recent Glucose Results:   Lab Results   Component Value Date/Time     (H) 03/02/2018 07:00 AM     (H) 03/02/2018 02:42 AM     (H) 03/01/2018 10:54 PM    GLUCPOC 176 (H) 03/02/2018 11:32 AM    GLUCPOC 239 (H) 03/02/2018 09:27 AM    GLUCPOC 163 (H) 03/02/2018 07:16 AM        Lab Results   Component Value Date/Time    Creatinine 0.46 (L) 03/02/2018 07:00 AM     Estimated Creatinine Clearance: 165 mL/min (based on Cr of 0.46). Active Orders   Diet    DIET DIABETIC CONSISTENT CARB Regular        PO intake: No data found. Will continue to follow as needed. Thank you.   Dearl Marie, MS, RN, CDE

## 2018-03-02 NOTE — ROUTINE PROCESS
Called to check on the status of 65, Mona on 5W states the room is still in the process of being cleaned

## 2018-03-02 NOTE — PROGRESS NOTES
Problem: Diabetes Self-Management  Goal: *Insulin pump training  Outcome: Not Progressing Towards Goal  Variance: Patient Condition  Comments: Patient does not have an insulin pump

## 2018-03-02 NOTE — PROGRESS NOTES
1845: TRANSFER - IN REPORT:    Verbal report received from [place] (name) on Vladimir Henning  being received from ED (unit) for routine progression of care      Report consisted of patients Situation, Background, Assessment and   Recommendations(SBAR). Information from the following report(s) SBAR, Kardex, ED Summary and Procedure Summary was reviewed with the receiving nurse. Opportunity for questions and clarification was provided. Assessment completed upon patients arrival to unit and care assumed. Primary Nurse Everton Jama RN and Jacob Villela RN performed a dual skin assessment on this patient No impairment noted  Gomez score is 23      1930: Bedside shift change report given to Eleazar Garza RN (oncoming nurse) by Harlan Velasco RN (offgoing nurse). Report included the following information SBAR, Kardex, ED Summary, Procedure Summary, MAR, Recent Results, Med Rec Status, Cardiac Rhythm Sinus Tachycardia and Alarm Parameters .

## 2018-03-02 NOTE — PROGRESS NOTES
TRANSFER - IN REPORT:    Verbal report received from Zenaida (name) on Leora Osorio  being received from [facility] (unit) for routine progression of care      Report consisted of patients Situation, Background, Assessment and   Recommendations(SBAR). Information from the following report(s) SBAR, Kardex and MAR was reviewed with the receiving nurse. Opportunity for questions and clarification was provided. Assessment completed upon patients arrival to unit and care assumed. Room not cleaned, will call back for patient once room has been cleaned and is ready. Bedside shift change report given to Elizabeth Ventura (oncoming nurse) by Maurice Figueroa (offgoing nurse). Report included the following information SBAR, Kardex and MAR.

## 2018-03-03 LAB
ANION GAP SERPL CALC-SCNC: 9 MMOL/L (ref 5–15)
BUN SERPL-MCNC: 8 MG/DL (ref 6–20)
BUN/CREAT SERPL: 16 (ref 12–20)
CALCIUM SERPL-MCNC: 8.3 MG/DL (ref 8.5–10.1)
CHLORIDE SERPL-SCNC: 105 MMOL/L (ref 97–108)
CO2 SERPL-SCNC: 27 MMOL/L (ref 21–32)
CREAT SERPL-MCNC: 0.49 MG/DL (ref 0.55–1.02)
EST. AVERAGE GLUCOSE BLD GHB EST-MCNC: 269 MG/DL
GLUCOSE BLD STRIP.AUTO-MCNC: 134 MG/DL (ref 65–100)
GLUCOSE BLD STRIP.AUTO-MCNC: 184 MG/DL (ref 65–100)
GLUCOSE BLD STRIP.AUTO-MCNC: 230 MG/DL (ref 65–100)
GLUCOSE BLD STRIP.AUTO-MCNC: 232 MG/DL (ref 65–100)
GLUCOSE SERPL-MCNC: 208 MG/DL (ref 65–100)
HBA1C MFR BLD: 11 % (ref 4.2–6.3)
MAGNESIUM SERPL-MCNC: 1.9 MG/DL (ref 1.6–2.4)
POTASSIUM SERPL-SCNC: 3.4 MMOL/L (ref 3.5–5.1)
SERVICE CMNT-IMP: ABNORMAL
SODIUM SERPL-SCNC: 141 MMOL/L (ref 136–145)

## 2018-03-03 PROCEDURE — 82962 GLUCOSE BLOOD TEST: CPT

## 2018-03-03 PROCEDURE — 65270000032 HC RM SEMIPRIVATE

## 2018-03-03 PROCEDURE — 36415 COLL VENOUS BLD VENIPUNCTURE: CPT | Performed by: FAMILY MEDICINE

## 2018-03-03 PROCEDURE — 74011636637 HC RX REV CODE- 636/637: Performed by: HOSPITALIST

## 2018-03-03 PROCEDURE — 74011250636 HC RX REV CODE- 250/636: Performed by: HOSPITALIST

## 2018-03-03 PROCEDURE — 83036 HEMOGLOBIN GLYCOSYLATED A1C: CPT | Performed by: HOSPITALIST

## 2018-03-03 PROCEDURE — 74011000258 HC RX REV CODE- 258: Performed by: HOSPITALIST

## 2018-03-03 PROCEDURE — 74011250637 HC RX REV CODE- 250/637: Performed by: INTERNAL MEDICINE

## 2018-03-03 PROCEDURE — 83735 ASSAY OF MAGNESIUM: CPT | Performed by: FAMILY MEDICINE

## 2018-03-03 PROCEDURE — 74011250637 HC RX REV CODE- 250/637: Performed by: HOSPITALIST

## 2018-03-03 PROCEDURE — 80048 BASIC METABOLIC PNL TOTAL CA: CPT | Performed by: FAMILY MEDICINE

## 2018-03-03 RX ORDER — INSULIN GLARGINE 100 [IU]/ML
32 INJECTION, SOLUTION SUBCUTANEOUS DAILY
Status: DISCONTINUED | OUTPATIENT
Start: 2018-03-03 | End: 2018-03-05 | Stop reason: HOSPADM

## 2018-03-03 RX ORDER — LEVOFLOXACIN 5 MG/ML
750 INJECTION, SOLUTION INTRAVENOUS EVERY 24 HOURS
Status: DISCONTINUED | OUTPATIENT
Start: 2018-03-03 | End: 2018-03-03

## 2018-03-03 RX ORDER — INSULIN LISPRO 100 [IU]/ML
4 INJECTION, SOLUTION INTRAVENOUS; SUBCUTANEOUS
Status: DISCONTINUED | OUTPATIENT
Start: 2018-03-03 | End: 2018-03-05 | Stop reason: HOSPADM

## 2018-03-03 RX ADMIN — CEFTRIAXONE 1 G: 1 INJECTION, POWDER, FOR SOLUTION INTRAMUSCULAR; INTRAVENOUS at 11:54

## 2018-03-03 RX ADMIN — DOCUSATE SODIUM 100 MG: 100 CAPSULE, LIQUID FILLED ORAL at 07:02

## 2018-03-03 RX ADMIN — ACETAMINOPHEN 650 MG: 325 TABLET ORAL at 20:40

## 2018-03-03 RX ADMIN — INSULIN LISPRO 4 UNITS: 100 INJECTION, SOLUTION INTRAVENOUS; SUBCUTANEOUS at 17:08

## 2018-03-03 RX ADMIN — INSULIN LISPRO 3 UNITS: 100 INJECTION, SOLUTION INTRAVENOUS; SUBCUTANEOUS at 07:03

## 2018-03-03 RX ADMIN — INSULIN LISPRO 2 UNITS: 100 INJECTION, SOLUTION INTRAVENOUS; SUBCUTANEOUS at 17:08

## 2018-03-03 RX ADMIN — INSULIN LISPRO 4 UNITS: 100 INJECTION, SOLUTION INTRAVENOUS; SUBCUTANEOUS at 11:53

## 2018-03-03 RX ADMIN — INSULIN GLARGINE 32 UNITS: 100 INJECTION, SOLUTION SUBCUTANEOUS at 11:53

## 2018-03-03 RX ADMIN — Medication 10 ML: at 22:42

## 2018-03-03 RX ADMIN — SIMVASTATIN 10 MG: 10 TABLET, FILM COATED ORAL at 22:41

## 2018-03-03 RX ADMIN — Medication 10 ML: at 02:47

## 2018-03-03 RX ADMIN — INSULIN LISPRO 3 UNITS: 100 INJECTION, SOLUTION INTRAVENOUS; SUBCUTANEOUS at 11:53

## 2018-03-03 RX ADMIN — INSULIN LISPRO 4 UNITS: 100 INJECTION, SOLUTION INTRAVENOUS; SUBCUTANEOUS at 10:02

## 2018-03-03 NOTE — PROGRESS NOTES
Hospitalist Progress Note  Rayna Banks MD  Answering service: [phone] OR [phone] from in house phone        Date of Service:  3/3/2018  NAME:  Brooks Parkinson  :  1990  MRN:  [id number]      Admission Summary:   16-year-old female with past medical history of insulin-dependent diabetes mellitus, who presents to the hospital complaining of the above mentioned symptoms. The patient reports her symptoms started about two days back. Interval history / Subjective:       Doing better today. Denies any abd pain,n/v. manuel diet. Long d/w pt explaining bacteremia. Pt req pt info to read over and for her mom,d/w RN to provide. Temp 99, Tmax 24hr 100.4 on 3/2 @1500     Assessment & Plan:       Sepsis due to E.coli bacteremia   -BCx  positive for e coli 3  , started on zosyn  On 3/1. chaged to iv Rocephin as of 3/3( unfortunately is resistant to quinolones). D/w pharmacy to see if any good oral option for e coli bacteremia, ?poss bactrim  -repeat BCx 3/2 ngtd  -Clinically has pyelonephritis  -CT scan abd 3/1 b/l interstitial nephritis,b/l upper pole enhancements, no hydro/abscess  -UA  normal wbc. UCx <10k growth,not signif enough to call uti  - tamiflu already prev d/c as patient does not have flu . -apprec ID recs, cont zosyn until sensitivities, may need repeat imaging to reeval kidney b/l lesions  -rec repeat ct abd o/pt to ensure resolution once complete tx for current infx.  Will c/s urology for 2nd opinion    Pyelonephritis  -iv zosyn, change to rocephin as of 3/3 based upon sensitivities    DKA ,resolved  -weaned off insulin gtt  3/2  -incr Lantus 32units daily (takes lantus 38units daily,primarily at night but pt states she sometimes forgets to raghav her insulin and takes in mornings in that instance)  -SSI norm, add humaolig 4 units tid ac (takes 7units tid ac at home)  -apprec DTC recs    hypokalemia ,resolved  -repleted    6 week preg terminated in dec   Bleeding now stopped       Dispo:  -uro c/s  -f/u ID recs for abx on d/c given resistance to quinolones  -d/w pharm to also assit with po options, ? Bactrim on d/c given sensitivity. Will research and contact me in this regards  -monitor glucs, adjust inslin further  -if remains afebrile for at least 12-24 hrs and ok with both ID and Uro then poss d/c sun or else mon as d/w pt with o/pt repeat ct imaging and poss o/pt uro f/u    Code status: full   DVT prophylaxis: lovenox     Care Plan discussed with: Patient/Family  Disposition: TBD     Hospital Problems  Date Reviewed: 2/28/2018          Codes Class Noted POA    * (Principal)DKA (diabetic ketoacidoses) ([facility] ICD-10-CM: E13.10  ICD-9-CM: 250.10  2/28/2018 Unknown                Review of Systems:   A comprehensive review of systems was negative except for that written in the HPI. Vital Signs:    Last 24hrs VS reviewed since prior progress note. Most recent are:  Visit Vitals    /69    Pulse 85    Temp 99 °F (37.2 °C)    Resp 16    Ht 5' 4.5\" (1.638 m)    Wt 62.7 kg (138 lb 3.7 oz)    SpO2 97%    BMI 23.36 kg/m2       No intake or output data in the 24 hours ending 03/03/18 0825     Physical Examination:             Constitutional:  No acute distress, cooperative, pleasant    ENT:  Oral mucous moist, oropharynx benign. Neck supple,    Resp:  CTA bilaterally. No wheezing/rhonchi/rales. No accessory muscle use   CV:  Regular rhythm, normal rate, no murmurs, gallops, rubs    GI:  Soft, non distended, non tender. normoactive bowel sounds, no hepatosplenomegaly ,flank tenderness    Musculoskeletal:  No edema, warm, 2+ pulses throughout    Neurologic:  Moves all extremities.   AAOx3, CN II-XII reviewed            Data Review:    Review and/or order of clinical lab test      Labs:     Recent Labs      03/02/18   0242  03/01/18   0408   WBC  6.4  16.0*   HGB  12.7  13.2   HCT  36.1  39.3   PLT  228 271     Recent Labs      03/03/18   0215  03/02/18   0700  03/02/18   0242   02/28/18   2244   NA  141  140  140   < >  136   K  3.4*  3.2*  3.1*   < >  4.0   CL  105  107  107   < >  106   CO2  27  24  23   < >  13*   BUN  8  5*  5*   < >  17   CREA  0.49*  0.46*  0.47*   < >  0.93   GLU  208*  184*  189*   < >  172*   CA  8.3*  8.6  8.2*   < >  8.9   MG  1.9  1.9  1.8   < >  2.1   PHOS   --    --    --    --   1.7*    < > = values in this interval not displayed. Recent Labs      02/28/18   1712   SGOT  18   ALT  23   AP  205*   TBILI  0.4   TP  9.5*   ALB  3.8   GLOB  5.7*   LPSE  48*     No results for input(s): INR, PTP, APTT in the last 72 hours. No lab exists for component: INREXT, INREXT   No results for input(s): FE, TIBC, PSAT, FERR in the last 72 hours. Lab Results   Component Value Date/Time    Folate >20.0 02/22/2017 09:40 AM      No results for input(s): PH, PCO2, PO2 in the last 72 hours. No results for input(s): CPK, CKNDX, TROIQ in the last 72 hours.     No lab exists for component: CPKMB  Lab Results   Component Value Date/Time    Cholesterol, total 189 02/22/2017 09:40 AM    HDL Cholesterol 85 02/22/2017 09:40 AM    LDL, calculated 95 02/22/2017 09:40 AM    Triglyceride 46 02/22/2017 09:40 AM     Lab Results   Component Value Date/Time    Glucose (POC) 230 (H) 03/03/2018 06:19 AM    Glucose (POC) 262 (H) 03/02/2018 09:08 PM    Glucose (POC) 176 (H) 03/02/2018 04:41 PM    Glucose (POC) 176 (H) 03/02/2018 11:32 AM    Glucose (POC) 239 (H) 03/02/2018 09:27 AM     Lab Results   Component Value Date/Time    Color YELLOW/STRAW 02/28/2018 06:06 PM    Appearance CLEAR 02/28/2018 06:06 PM    Specific gravity 1.026 02/28/2018 06:06 PM    pH (UA) 5.0 02/28/2018 06:06 PM    Protein 100 (A) 02/28/2018 06:06 PM    Glucose >1000 (A) 02/28/2018 06:06 PM    Ketone >80 (A) 02/28/2018 06:06 PM    Bilirubin NEGATIVE  02/28/2018 06:06 PM    Urobilinogen 0.2 02/28/2018 06:06 PM    Nitrites NEGATIVE  02/28/2018 06:06 PM    Leukocyte Esterase NEGATIVE  02/28/2018 06:06 PM    Epithelial cells MODERATE (A) 02/28/2018 06:06 PM    Bacteria NEGATIVE  02/28/2018 06:06 PM    WBC 0-4 02/28/2018 06:06 PM    RBC 0-5 02/28/2018 06:06 PM         Medications Reviewed:     Current Facility-Administered Medications   Medication Dose Route Frequency    insulin lispro (HUMALOG) injection 4 Units  4 Units SubCUTAneous TIDAC    cefTRIAXone (ROCEPHIN) 1 g in 0.9% sodium chloride (MBP/ADV) 50 mL MBP  1 g IntraVENous Q24H    insulin glargine (LANTUS) injection 32 Units  32 Units SubCUTAneous DAILY    glucose chewable tablet 16 g  4 Tab Oral PRN    dextrose (D50W) injection syrg 12.5-25 g  12.5-25 g IntraVENous PRN    glucagon (GLUCAGEN) injection 1 mg  1 mg IntraMUSCular PRN    insulin lispro (HUMALOG) injection   SubCUTAneous AC&HS    docusate sodium (COLACE) capsule 100 mg  100 mg Oral BID    simvastatin (ZOCOR) tablet 10 mg  10 mg Oral QHS    dextrose (D50W) injection syrg 12.5-25 g  12.5-25 g IntraVENous PRN    sodium chloride (NS) flush 5-10 mL  5-10 mL IntraVENous Q8H    sodium chloride (NS) flush 5-10 mL  5-10 mL IntraVENous PRN    ondansetron (ZOFRAN) injection 4 mg  4 mg IntraVENous Q4H PRN    morphine injection 1 mg  1 mg IntraVENous Q4H PRN    acetaminophen (TYLENOL) tablet 650 mg  650 mg Oral Q6H PRN     ______________________________________________________________________  EXPECTED LENGTH OF STAY: 3d 19h  ACTUAL LENGTH OF STAY:          3                 Yvette Moy MD

## 2018-03-03 NOTE — PROGRESS NOTES
Primary Nurse Lesly Howell and Federico Vegas RN performed a dual skin assessment on this patient No impairment noted  Gomez score is 22

## 2018-03-03 NOTE — PROGRESS NOTES
Shift change report given to Elizabeth Anton RN (oncoming nurse) by Erma Cardona RN (offgoing nurse). Report included the following information SBAR, Kardex, MAR and Recent Results.

## 2018-03-03 NOTE — PROGRESS NOTES
Patient seen and examined    IMPRESSION    1. GNR bacteremia likely nephrologic in origin     2. Bilateral hypodense kidney lesions     3. Type 1 DM, recent ketocacidosis    PLAN    1. Continue zosyn as she has responded nicely     2. Ff up repeat blood cultures    3. If the organism is sensitive to levaquin, she can be eventually be placed on oral therapy    4. I think she will eventually need a repeat CT to document resolution of renal lesions     Dr. Ariel Cunningham will check cx over the weekend.  Please call him with questions

## 2018-03-04 LAB
ANION GAP SERPL CALC-SCNC: 7 MMOL/L (ref 5–15)
BUN SERPL-MCNC: 7 MG/DL (ref 6–20)
BUN/CREAT SERPL: 15 (ref 12–20)
CALCIUM SERPL-MCNC: 9.3 MG/DL (ref 8.5–10.1)
CHLORIDE SERPL-SCNC: 104 MMOL/L (ref 97–108)
CO2 SERPL-SCNC: 27 MMOL/L (ref 21–32)
CREAT SERPL-MCNC: 0.48 MG/DL (ref 0.55–1.02)
GLUCOSE BLD STRIP.AUTO-MCNC: 100 MG/DL (ref 65–100)
GLUCOSE BLD STRIP.AUTO-MCNC: 111 MG/DL (ref 65–100)
GLUCOSE BLD STRIP.AUTO-MCNC: 160 MG/DL (ref 65–100)
GLUCOSE BLD STRIP.AUTO-MCNC: 216 MG/DL (ref 65–100)
GLUCOSE SERPL-MCNC: 223 MG/DL (ref 65–100)
MAGNESIUM SERPL-MCNC: 2.3 MG/DL (ref 1.6–2.4)
POTASSIUM SERPL-SCNC: 3.6 MMOL/L (ref 3.5–5.1)
SERVICE CMNT-IMP: ABNORMAL
SERVICE CMNT-IMP: NORMAL
SODIUM SERPL-SCNC: 138 MMOL/L (ref 136–145)

## 2018-03-04 PROCEDURE — 74011636637 HC RX REV CODE- 636/637: Performed by: HOSPITALIST

## 2018-03-04 PROCEDURE — 74011250636 HC RX REV CODE- 250/636: Performed by: HOSPITALIST

## 2018-03-04 PROCEDURE — 80048 BASIC METABOLIC PNL TOTAL CA: CPT | Performed by: FAMILY MEDICINE

## 2018-03-04 PROCEDURE — 74011250637 HC RX REV CODE- 250/637: Performed by: INTERNAL MEDICINE

## 2018-03-04 PROCEDURE — 83735 ASSAY OF MAGNESIUM: CPT | Performed by: FAMILY MEDICINE

## 2018-03-04 PROCEDURE — 65270000032 HC RM SEMIPRIVATE

## 2018-03-04 PROCEDURE — 36415 COLL VENOUS BLD VENIPUNCTURE: CPT | Performed by: FAMILY MEDICINE

## 2018-03-04 PROCEDURE — 74011000258 HC RX REV CODE- 258: Performed by: HOSPITALIST

## 2018-03-04 PROCEDURE — 82962 GLUCOSE BLOOD TEST: CPT

## 2018-03-04 RX ADMIN — Medication 10 ML: at 21:26

## 2018-03-04 RX ADMIN — ACETAMINOPHEN 650 MG: 325 TABLET ORAL at 07:27

## 2018-03-04 RX ADMIN — CEFTRIAXONE 1 G: 1 INJECTION, POWDER, FOR SOLUTION INTRAMUSCULAR; INTRAVENOUS at 10:54

## 2018-03-04 RX ADMIN — INSULIN GLARGINE 32 UNITS: 100 INJECTION, SOLUTION SUBCUTANEOUS at 08:37

## 2018-03-04 RX ADMIN — ACETAMINOPHEN 650 MG: 325 TABLET ORAL at 23:52

## 2018-03-04 RX ADMIN — SIMVASTATIN 10 MG: 10 TABLET, FILM COATED ORAL at 21:22

## 2018-03-04 RX ADMIN — INSULIN LISPRO 2 UNITS: 100 INJECTION, SOLUTION INTRAVENOUS; SUBCUTANEOUS at 12:37

## 2018-03-04 RX ADMIN — INSULIN LISPRO 4 UNITS: 100 INJECTION, SOLUTION INTRAVENOUS; SUBCUTANEOUS at 18:03

## 2018-03-04 RX ADMIN — INSULIN LISPRO 3 UNITS: 100 INJECTION, SOLUTION INTRAVENOUS; SUBCUTANEOUS at 07:22

## 2018-03-04 RX ADMIN — INSULIN LISPRO 4 UNITS: 100 INJECTION, SOLUTION INTRAVENOUS; SUBCUTANEOUS at 07:20

## 2018-03-04 RX ADMIN — INSULIN LISPRO 4 UNITS: 100 INJECTION, SOLUTION INTRAVENOUS; SUBCUTANEOUS at 12:37

## 2018-03-04 NOTE — PROGRESS NOTES
Hospitalist Progress Note  Cheikh Davenport MD  Answering service: [phone] OR [phone] from in house phone        Date of Service:  3/4/2018  NAME:  Donzella Hammans  :  1990  MRN:  [id number]      Admission Summary:   65-year-old female with past medical history of insulin-dependent diabetes mellitus, who presents to the hospital complaining of the above mentioned symptoms. The patient reports her symptoms started about two days back. Interval history / Subjective:     Pt afebrile but low grade temp 99 ovenright. D/w on call ID, no good oral options therefore pt will req picc line with iv abx. D/w pt who began to cry profusely and nonstop, stating she wants to go home. Long d/w pt to explain the reasoning and wanting to be safe given blood line infx. Pt stating she understands but is devastated and wont stop crying. boyfriend at bedside trying to calm her down. d/w RN, no overnight issues noted     Assessment & Plan:       Sepsis due to E.coli bacteremia   -BCx  positive for e coli 3  , started on zosyn  On 3/1. chaged to iv Rocephin as of 3/3( unfortunately is resistant to quinolones). D/w pharmacy to see if any good oral option for e coli bacteremia based upon sensitivities but there is not  -called on call ID dr. Juana Ackerman to discuss on 3/4. Confirmed oral abx not an option and pt will req iv abx via picc  -repeat BCx 3/2 ngtd  -Clinically has pyelonephritis  -CT scan abd 3/1 b/l interstitial nephritis,b/l upper pole enhancements, no hydro/abscess  -UA  normal wbc. UCx <10k growth,not signif enough to call uti  - tamiflu already prev d/c as patient does not have flu .   -apprec ID recs, cont zosyn until sensitivities, may need repeat imaging to reeval kidney b/l lesions  -pt seen by uro, no further intervention or imaging unless patient has persisting fevers; f/u uro dr Betty Baldwin o/pt  -ordered for PICC in am and c/s CM for iv abx. Await eval with ID in am for duration of abx and if any labs needed    Acute Pyelonephritis  -iv zosyn, change to rocephin as of 3/3 based upon sensitivities  -apprec ID recs, cont zosyn until sensitivities, may need repeat imaging to reeval kidney b/l lesions  -pt seen by uro, renal findings typical for acute pyelo. no further intervention or imaging unless patient has persisting fevers; f/u uro dr Ulises Kong o/pt    DKA ,resolved with h/o DM type I. [id number]  -weaned off insulin gtt  3/2  -Lantus 32units daily (takes lantus 38units daily,primarily at night but pt states she sometimes forgets to raghav her insulin and takes in mornings in that instance)  -SSI norm, humalog 4 units tid ac (takes 7units tid ac at home)  -apprec DTC recs  -c/s for DM education to see tmrw    hypokalemia ,resolved  -repleted    6 week preg terminated in dec   Bleeding now stopped       Dispo:  -f/u ID recs for abx on d/c given resistance to quinolones  -d/w ID today, no oral options. Pt needs PICC for iv abx. ID to make recs tmrw  -c/s CM to assist with iv abx o/pt  -monitor glucs, adjust insulin accordingy  -antic poss d/c home mon     Code status: full   DVT prophylaxis: lovenox     Care Plan discussed with: Patient/Family  Disposition: TBD     Hospital Problems  Date Reviewed: 2/28/2018          Codes Class Noted POA    * (Principal)[facility]diabetic [facility]acidoses) [facility] ICD-10-CM: E13.10  ICD-9-CM: 250.10  2/28/2018 Unknown                Review of Systems:   A comprehensive review of systems was negative except for that written in the HPI. Vital Signs:    Last 24hrs VS reviewed since prior progress note.  Most recent are:  Visit Vitals    /67    Pulse 76    Temp 98.2 °F (36.8 °C)    Resp 16    Ht 5' 4.5\" (1.638 m)    Wt 62.7 kg (138 lb 3.7 oz)    SpO2 99%    BMI 23.36 kg/m2       No intake or output data in the 24 hours ending 03/04/18 0859     Physical Examination:             Constitutional:  No acute distress, cooperative, pleasant    ENT:  Oral mucous moist, oropharynx benign. Neck supple,    Resp:  CTA bilaterally. No wheezing/rhonchi/rales. No accessory muscle use   CV:  Regular rhythm, normal rate, no murmurs, gallops, rubs    GI:  Soft, non distended, non tender. normoactive bowel sounds, no hepatosplenomegaly ,flank tenderness    Musculoskeletal:  No edema, warm, 2+ pulses throughout    Neurologic:  Moves all extremities. AAOx3, CN II-XII reviewed            Data Review:    Review and/or order of clinical lab test      Labs:     Recent Labs      03/02/18   0242   WBC  6.4   HGB  12.7   HCT  36.1   PLT  228     Recent Labs      03/04/18   0551  03/03/18   0215  03/02/18   0700   NA  138  141  140   K  3.6  3.4*  3.2*   CL  104  105  107   CO2  27  27  24   BUN  7  8  5*   CREA  0.48*  0.49*  0.46*   GLU  223*  208*  184*   CA  9.3  8.3*  8.6   MG  2.3  1.9  1.9     No results for input(s): SGOT, GPT, ALT, AP, TBIL, TBILI, TP, ALB, GLOB, GGT, AML, LPSE in the last 72 hours. No lab exists for component: AMYP, HLPSE  No results for input(s): INR, PTP, APTT in the last 72 hours. No lab exists for component: INREXT, INREXT   No results for input(s): FE, TIBC, PSAT, FERR in the last 72 hours. Lab Results   Component Value Date/Time    Folate >20.0 02/22/2017 09:40 AM      No results for input(s): PH, PCO2, PO2 in the last 72 hours. No results for input(s): CPK, CKNDX, TROIQ in the last 72 hours.     No lab exists for component: CPKMB  Lab Results   Component Value Date/Time    Cholesterol, total 189 02/22/2017 09:40 AM    HDL Cholesterol 85 02/22/2017 09:40 AM    LDL, calculated 95 02/22/2017 09:40 AM    Triglyceride 46 02/22/2017 09:40 AM     Lab Results   Component Value Date/Time    Glucose (POC) 216 (H) 03/04/2018 06:19 AM    Glucose (POC) 134 (H) 03/03/2018 09:17 PM    Glucose (POC) 184 (H) 03/03/2018 04:14 PM    Glucose (POC) 232 (H) 03/03/2018 11:18 AM    Glucose (POC) 230 (H) 03/03/2018 06:19 AM     Lab Results   Component Value Date/Time    Color YELLOW/STRAW 02/28/2018 06:06 PM    Appearance CLEAR 02/28/2018 06:06 PM    Specific gravity 1.026 02/28/2018 06:06 PM    pH (UA) 5.0 02/28/2018 06:06 PM    Protein 100 (A) 02/28/2018 06:06 PM    Glucose >1000 (A) 02/28/2018 06:06 PM    Ketone >80 (A) 02/28/2018 06:06 PM    Bilirubin NEGATIVE  02/28/2018 06:06 PM    Urobilinogen 0.2 02/28/2018 06:06 PM    Nitrites NEGATIVE  02/28/2018 06:06 PM    Leukocyte Esterase NEGATIVE  02/28/2018 06:06 PM    Epithelial cells MODERATE (A) 02/28/2018 06:06 PM    Bacteria NEGATIVE  02/28/2018 06:06 PM    WBC 0-4 02/28/2018 06:06 PM    RBC 0-5 02/28/2018 06:06 PM         Medications Reviewed:     Current Facility-Administered Medications   Medication Dose Route Frequency    insulin lispro (HUMALOG) injection 4 Units  4 Units SubCUTAneous TIDAC    cefTRIAXone (ROCEPHIN) 1 g in 0.9% sodium chloride (MBP/ADV) 50 mL MBP  1 g IntraVENous Q24H    insulin glargine (LANTUS) injection 32 Units  32 Units SubCUTAneous DAILY    glucose chewable tablet 16 g  4 Tab Oral PRN    dextrose (D50W) injection syrg 12.5-25 g  12.5-25 g IntraVENous PRN    glucagon (GLUCAGEN) injection 1 mg  1 mg IntraMUSCular PRN    insulin lispro (HUMALOG) injection   SubCUTAneous AC&HS    docusate sodium (COLACE) capsule 100 mg  100 mg Oral BID    simvastatin (ZOCOR) tablet 10 mg  10 mg Oral QHS    dextrose (D50W) injection syrg 12.5-25 g  12.5-25 g IntraVENous PRN    sodium chloride (NS) flush 5-10 mL  5-10 mL IntraVENous Q8H    sodium chloride (NS) flush 5-10 mL  5-10 mL IntraVENous PRN    ondansetron (ZOFRAN) injection 4 mg  4 mg IntraVENous Q4H PRN    morphine injection 1 mg  1 mg IntraVENous Q4H PRN    acetaminophen (TYLENOL) tablet 650 mg  650 mg Oral Q6H PRN     ______________________________________________________________________  EXPECTED LENGTH OF STAY: 3d 19h  ACTUAL LENGTH OF STAY:          4                 Maame Andersen MD

## 2018-03-04 NOTE — PROGRESS NOTES
Infectious Diseases Progress Note    Antibiotic Summary:  Zosyn  3/1 -- 3/3  Rocephin 3/3 -- present      Subjective:     She feels much better. Right flank pain resolved. Eating well with no N, V, D. No  symptoms    Objective:     Vitals:   Visit Vitals    /81    Pulse 85    Temp 98 °F (36.7 °C)    Resp 16    Ht 5' 4.5\" (1.638 m)    Wt 62.7 kg (138 lb 3.7 oz)    LMP 2018    SpO2 100%    BMI 23.36 kg/m2        Tmax:  Temp (24hrs), Av.3 °F (36.8 °C), Min:98 °F (36.7 °C), Max:99 °F (37.2 °C)      Exam:  General appearance: alert, no distress  Eyes: sclera and conj benign  Neck: supple  Lungs: clear to auscultation bilaterally  Heart: regular rate and rhythm, no murmur  Abdomen: soft, non-tender. BS +. No CVAT  Extremities: no edema  Skin: no rash  Neurologic: Grossly normal    IV Lines:    Labs:    Recent Labs      18   0551  18   0215  18   0700  18   0242  18   2254   WBC   --    --    --   6.4   --    HGB   --    --    --   12.7   --    PLT   --    --    --   228   --    BUN  7  8  5*  5*  5*   CREA  0.48*  0.49*  0.46*  0.47*  0.44*     BLOOD CULTURES:    = E coli in 3 of 4 bottles (quinolone resistant)   3/2 = NGSF    Assessment:     1. E coli UTI complicated by bilateral pyelonephritis and bacteremia: She is much better today.  unable to arrange home IV antibiotics today. I explained this to her. I discussed options --- home IV Rocephin vs Infusion Center. She prefers home IV Rx if possible. Discussed access options -- peripheral IV (if Home Health is willing) vs Midline vs PICC. I have discussed potential PICC line and Midline catheter complications with the patient including local infection, systemic infection, venous thrombosis, pulmonary embolism, catheter malfunction, etc. She will reflect over night. 2. IDDM with recent DKA      Plan:     1. Continue Rocephin    2. She will reflect on her preference for outpatient IV access    3.  consulted. 4. Anticipate discharge tomorrow      Discussed at length with the patient and her mother.  Questions answered    Eduard Deutsch MD

## 2018-03-04 NOTE — CONSULTS
Urology Consult      Assessment:  24y.o. year old female  Principal Problem:    DKA (diabetic ketoacidoses) ([facility] (2018)    Pyelonephritis    Plan:  Pyelonephritis - improved after abx. Flank pain resolved. Culture noted back - may be able to narrow abx spectrum. ... Renal abnormalities - these are findings that we see as a result of pyelonephritis. No further intervention or imaging unless patient has persisting fevers (which she hasn't. ...)    UTI risk - patient states increased UTI risk. I would like to see her as outpatient. Signing off. Will arrange outpatient office visit.  _____________________________________________________________________________    SUBJECTIVE:  Date of Consultation:  2018  Referring Physician: Dylon Dunn MD;Jhoan*  Reason for Consultation:  Renal abnormalities    Established Urologist:NONE  Primary care MD: Rakel Alfaro MD  - [phone]  Code state: Full Code    History of Present Illness:   24y.o. year old female - She was admitted to the hospital for DKA (diabetic ketoacidoses) ([facility] Right>Left flank pain. Clinical pyelo with fevers, vomiting. Days. ED admit for infection/pyelo. Noted IDDM with DKA. Denies prior  surgery/history. No fevers, chills, nausea, vomiting. Denies hematuria. Past Medical History:   Diagnosis Date         Diabetes mellitus       History reviewed. No pertinent surgical history. Family History   Problem Relation Age of Onset    Cancer Paternal Grandfather       Social History   Substance Use Topics    Smoking status: Former Smoker     Packs/day: 0.00    Smokeless tobacco: Never Used      Comment: quit date 17    Alcohol use No     No Known Allergies   Prior to Admission medications    Medication Sig Start Date End Date Taking? Authorizing Provider   insulin lispro (HUMALOG U-100 INSULIN) 100 unit/mL injection by SubCUTAneous route.  Sliding scale based on food   Yes Historical Provider simvastatin (ZOCOR) 20 mg tablet Take 10 mg by mouth nightly. Yes Historical Provider   acetaminophen (TYLENOL) 500 mg tablet Take 1,000 mg by mouth every six (6) hours as needed for Pain or Fever. Yes Historical Provider   oseltamivir (TAMIFLU) 75 mg capsule Take 75 mg by mouth two (2) times a day. Yes Historical Provider   diphenhydrAMINE (BENADRYL) 25 mg capsule Take 50 mg by mouth every six (6) hours as needed for Itching or Sleep. Yes Historical Provider   doxylamine-dm-acetaminophen (VICKS NYQUIL COLD/FLU LIQUICAP) 6.25- mg cap Take 1 Cap by mouth nightly. Yes Historical Provider   naproxen (NAPROSYN) 250 mg tablet Take 500 mg by mouth two (2) times daily as needed for Pain or Fever. Yes Historical Provider   insulin glargine (LANTUS,BASAGLAR) 100 unit/mL (3 mL) inpn 38 units daily  Patient taking differently: 38 Units by SubCUTAneous route nightly. 38 units daily 18   Mariah Acuna MD   ERGOCALCIFEROL, VITAMIN D2, (VITAMIN D PO) Take 1,000 Units by mouth. Historical Provider   aspirin 81 mg tablet Take 81 mg by mouth. Historical Provider         Review of Systems: (positive-bolded)  Unexplained weight loss, Dry eyes, Dry mouth, Chest pain, SOB, Constipation, Extremity weakness, Pallor, TIA symptoms, Confusion, Easy bruising    OBJECTIVE:  Patient Vitals for the past 8 hrs:   BP Temp Pulse Resp SpO2   18 0522 112/66 98.1 °F (36.7 °C) 81 16 99 %     Temp (24hrs), Av.8 °F (37.1 °C), Min:98.1 °F (36.7 °C), Max:99.2 °F (37.3 °C)    Intake and Output:      Physical Exam:  Appearance: Well-developed no acute distress  Conjunctiva: WNL  Pupil/iris: WNL  External ears/nose: Normal no lesions or deformities  Hearing:  WNL  Neck: Supple without masses  Thyroid: WNL  Respiratory effort: Breathing easily  Chest palpation: No tactile fremitus  Aortic: No enlargement or bruits  Lower extremity: No edema  Abdomen/flank: Soft nontender without masses no CVA tenderness  Liver/spleen: No organomegaly  Hernia: No ing/ventral hernia  Lymph: No adenopathy  Digits/nails: No clubbing cyanosis or petechiae  Skin inspection: Warm and dry  Skin palpation: No subcutaneous nodularity  Sensation: No sensory deficits  Judgment/Insight: intact  Mood/Affect: normal    CT IMPRESSION  IMPRESSION: Bilateral focal interstitial nephritis (lobar nephronia) without  hydronephrosis or organized abscess. Recent Results (from the past 24 hour(s))   GLUCOSE, POC    Collection Time: 03/03/18 11:18 AM   Result Value Ref Range    Glucose (POC) 232 (H) 65 - 100 mg/dL    Performed by Cirilo SHANNON(CON)    GLUCOSE, POC    Collection Time: 03/03/18  4:14 PM   Result Value Ref Range    Glucose (POC) 184 (H) 65 - 100 mg/dL    Performed by CRYSTAL PHILIP (PCT)    GLUCOSE, POC    Collection Time: 03/03/18  9:17 PM   Result Value Ref Range    Glucose (POC) 134 (H) 65 - 100 mg/dL    Performed by Nicky Oh    METABOLIC PANEL, BASIC    Collection Time: 03/04/18  5:51 AM   Result Value Ref Range    Sodium 138 136 - 145 mmol/L    Potassium 3.6 3.5 - 5.1 mmol/L    Chloride 104 97 - 108 mmol/L    CO2 27 21 - 32 mmol/L    Anion gap 7 5 - 15 mmol/L    Glucose 223 (H) 65 - 100 mg/dL    BUN 7 6 - 20 MG/DL    Creatinine 0.48 (L) 0.55 - 1.02 MG/DL    BUN/Creatinine ratio 15 12 - 20      GFR est AA >60 >60 ml/min/1.73m2    GFR est non-AA >60 >60 ml/min/1.73m2    Calcium 9.3 8.5 - 10.1 MG/DL   MAGNESIUM    Collection Time: 03/04/18  5:51 AM   Result Value Ref Range    Magnesium 2.3 1.6 - 2.4 mg/dL   GLUCOSE, POC    Collection Time: 03/04/18  6:19 AM   Result Value Ref Range    Glucose (POC) 216 (H) 65 - 100 mg/dL    Performed by Rock Salter        Current Antimicrobial Therapy (168h ago through future)    Ordered     Start Stop    03/03/18 0823  cefTRIAXone (ROCEPHIN) 1 g in 0.9% sodium chloride (MBP/ADV) 50 mL MBP  1 g,   IntraVENous,   EVERY 24 HOURS      03/03/18 1000 --        Cultures:    All Micro Results     Procedure Component Value Units Date/Time    CULTURE, BLOOD [[id number]] Collected:  03/02/18 0242    Order Status:  Completed Specimen:  Blood from Blood Updated:  03/04/18 0535     Special Requests: NO SPECIAL REQUESTS        Culture result: NO GROWTH 2 DAYS       CULTURE, BLOOD, PAIRED [[id number]]  (Abnormal)  (Susceptibility) Collected:  02/28/18 2146    Order Status:  Completed Specimen:  Blood Updated:  03/03/18 0613     Special Requests: NO SPECIAL REQUESTS        Culture result:         ESCHERICHIA COLI GROWING IN 3 OF 4 BOTTLES DRAWN (SITES = R WRIST AND RAC) (A)              REMAINING BOTTLE(S) HAS/HAVE NO GROWTH SO FAR    CULTURE, URINE [[id number]] Collected:  02/28/18 1806    Order Status:  Completed Specimen:  Urine from Clean catch Updated:  03/02/18 1321     Special Requests: NO SPECIAL REQUESTS        Marquez Count --        <10,000  COLONIES/mL       Culture result: NO SIGNIFICANT GROWTH       CULTURE, BLOOD [[id number]]     Order Status:  Canceled Specimen:  Blood from Blood     RESPIRATORY PANEL,PCR,NASOPHARYNGEAL [[id number]] Collected:  02/28/18 2244    Order Status:  Completed Specimen:  Nasopharyngeal Updated:  03/01/18 0645     Adenovirus NOT DETECTED        Coronavirus 229E NOT DETECTED        Coronavirus HKU1 NOT DETECTED        Coronavirus CVNL63 NOT DETECTED        Coronavirus OC43 NOT DETECTED        Metapneumovirus NOT DETECTED        Rhinovirus and Enterovirus NOT DETECTED        Influenza A NOT DETECTED        Influenza A, subtype H1 NOT DETECTED        Influenza A, subtype H3 NOT DETECTED        INFLUENZA A H1N1 PCR NOT DETECTED        Influenza B NOT DETECTED        Parainfluenza 1 NOT DETECTED        Parainfluenza 2 NOT DETECTED        Parainfluenza 3 NOT DETECTED        Parainfluenza virus 4 NOT DETECTED        RSV by PCR NOT DETECTED        Bordetella pertussis - PCR NOT DETECTED        Chlamydophila pneumoniae DNA, QL, PCR NOT DETECTED        Mycoplasma pneumoniae DNA, QL, PCR NOT DETECTED       URINE CULTURE HOLD SAMPLE [[id number]] Collected:  02/28/18 1806    Order Status:  Completed Specimen:  Urine from Serum Updated:  02/28/18 1822     Urine culture hold         URINE ON HOLD IN MICROBIOLOGY DEPT FOR 3 DAYS. IF UNPRESERVED URINE IS SUBMITTED, IT CANNOT BE USED FOR ADDITIONAL TESTING AFTER 24 HRS, RECOLLECTION WILL BE REQUIRED.           Signed By: Amari De Santiago MD                         March 4, 2018

## 2018-03-05 ENCOUNTER — DOCUMENTATION ONLY (OUTPATIENT)
Dept: CASE MANAGEMENT | Age: 28
End: 2018-03-05

## 2018-03-05 VITALS
BODY MASS INDEX: 23.03 KG/M2 | SYSTOLIC BLOOD PRESSURE: 111 MMHG | OXYGEN SATURATION: 99 % | HEART RATE: 75 BPM | TEMPERATURE: 97.1 F | WEIGHT: 138.23 LBS | RESPIRATION RATE: 18 BRPM | DIASTOLIC BLOOD PRESSURE: 72 MMHG | HEIGHT: 65 IN

## 2018-03-05 LAB
ANION GAP SERPL CALC-SCNC: 9 MMOL/L (ref 5–15)
BUN SERPL-MCNC: 7 MG/DL (ref 6–20)
BUN/CREAT SERPL: 13 (ref 12–20)
CALCIUM SERPL-MCNC: 9.6 MG/DL (ref 8.5–10.1)
CHLORIDE SERPL-SCNC: 100 MMOL/L (ref 97–108)
CO2 SERPL-SCNC: 27 MMOL/L (ref 21–32)
CREAT SERPL-MCNC: 0.52 MG/DL (ref 0.55–1.02)
ERYTHROCYTE [DISTWIDTH] IN BLOOD BY AUTOMATED COUNT: 12.1 % (ref 11.5–14.5)
GLUCOSE BLD STRIP.AUTO-MCNC: 203 MG/DL (ref 65–100)
GLUCOSE BLD STRIP.AUTO-MCNC: 231 MG/DL (ref 65–100)
GLUCOSE BLD STRIP.AUTO-MCNC: 242 MG/DL (ref 65–100)
GLUCOSE SERPL-MCNC: 202 MG/DL (ref 65–100)
HCT VFR BLD AUTO: 39.2 % (ref 35–47)
HGB BLD-MCNC: 14 G/DL (ref 11.5–16)
MCH RBC QN AUTO: 32.9 PG (ref 26–34)
MCHC RBC AUTO-ENTMCNC: 35.7 G/DL (ref 30–36.5)
MCV RBC AUTO: 92 FL (ref 80–99)
NRBC # BLD: 0 K/UL (ref 0–0.01)
NRBC BLD-RTO: 0 PER 100 WBC
PLATELET # BLD AUTO: 362 K/UL (ref 150–400)
PMV BLD AUTO: 9.1 FL (ref 8.9–12.9)
POTASSIUM SERPL-SCNC: 3.4 MMOL/L (ref 3.5–5.1)
RBC # BLD AUTO: 4.26 M/UL (ref 3.8–5.2)
SERVICE CMNT-IMP: ABNORMAL
SODIUM SERPL-SCNC: 136 MMOL/L (ref 136–145)
WBC # BLD AUTO: 7.2 K/UL (ref 3.6–11)

## 2018-03-05 PROCEDURE — 36415 COLL VENOUS BLD VENIPUNCTURE: CPT | Performed by: HOSPITALIST

## 2018-03-05 PROCEDURE — 85027 COMPLETE CBC AUTOMATED: CPT | Performed by: HOSPITALIST

## 2018-03-05 PROCEDURE — 77030018719 HC DRSG PTCH ANTIMIC J&J -A

## 2018-03-05 PROCEDURE — C1751 CATH, INF, PER/CENT/MIDLINE: HCPCS

## 2018-03-05 PROCEDURE — 74011250636 HC RX REV CODE- 250/636: Performed by: HOSPITALIST

## 2018-03-05 PROCEDURE — 74011000258 HC RX REV CODE- 258: Performed by: HOSPITALIST

## 2018-03-05 PROCEDURE — 77030020847 HC STATLOK BARD -A

## 2018-03-05 PROCEDURE — 74011250637 HC RX REV CODE- 250/637: Performed by: INTERNAL MEDICINE

## 2018-03-05 PROCEDURE — 80048 BASIC METABOLIC PNL TOTAL CA: CPT | Performed by: HOSPITALIST

## 2018-03-05 PROCEDURE — 74011636637 HC RX REV CODE- 636/637: Performed by: HOSPITALIST

## 2018-03-05 PROCEDURE — 02HV33Z INSERTION OF INFUSION DEVICE INTO SUPERIOR VENA CAVA, PERCUTANEOUS APPROACH: ICD-10-PCS | Performed by: HOSPITALIST

## 2018-03-05 PROCEDURE — 76937 US GUIDE VASCULAR ACCESS: CPT

## 2018-03-05 PROCEDURE — 36569 INSJ PICC 5 YR+ W/O IMAGING: CPT | Performed by: HOSPITALIST

## 2018-03-05 PROCEDURE — 82962 GLUCOSE BLOOD TEST: CPT

## 2018-03-05 PROCEDURE — 77030020365 HC SOL INJ SOD CL 0.9% 50ML

## 2018-03-05 RX ORDER — LORAZEPAM 2 MG/ML
0.5 INJECTION INTRAMUSCULAR ONCE
Status: COMPLETED | OUTPATIENT
Start: 2018-03-05 | End: 2018-03-05

## 2018-03-05 RX ORDER — SODIUM CHLORIDE 0.9 % (FLUSH) 0.9 %
10 SYRINGE (ML) INJECTION EVERY 8 HOURS
Status: DISCONTINUED | OUTPATIENT
Start: 2018-03-05 | End: 2018-03-05 | Stop reason: HOSPADM

## 2018-03-05 RX ORDER — SODIUM CHLORIDE 0.9 % (FLUSH) 0.9 %
20 SYRINGE (ML) INJECTION AS NEEDED
Status: DISCONTINUED | OUTPATIENT
Start: 2018-03-05 | End: 2018-03-05 | Stop reason: HOSPADM

## 2018-03-05 RX ORDER — INSULIN GLARGINE 100 [IU]/ML
INJECTION, SOLUTION SUBCUTANEOUS
Qty: 1 PEN | Refills: 1 | Status: SHIPPED
Start: 2018-03-05 | End: 2018-05-15 | Stop reason: ALTCHOICE

## 2018-03-05 RX ORDER — INSULIN LISPRO 100 [IU]/ML
INJECTION, SOLUTION INTRAVENOUS; SUBCUTANEOUS
Qty: 1 VIAL | Refills: 0 | Status: SHIPPED | OUTPATIENT
Start: 2018-03-05 | End: 2018-05-15 | Stop reason: DRUGHIGH

## 2018-03-05 RX ORDER — SODIUM CHLORIDE 0.9 % (FLUSH) 0.9 %
10 SYRINGE (ML) INJECTION EVERY 24 HOURS
Status: DISCONTINUED | OUTPATIENT
Start: 2018-03-05 | End: 2018-03-05 | Stop reason: HOSPADM

## 2018-03-05 RX ORDER — SODIUM CHLORIDE 0.9 % (FLUSH) 0.9 %
10 SYRINGE (ML) INJECTION AS NEEDED
Status: DISCONTINUED | OUTPATIENT
Start: 2018-03-05 | End: 2018-03-05 | Stop reason: HOSPADM

## 2018-03-05 RX ADMIN — ACETAMINOPHEN 650 MG: 325 TABLET ORAL at 10:26

## 2018-03-05 RX ADMIN — INSULIN LISPRO 3 UNITS: 100 INJECTION, SOLUTION INTRAVENOUS; SUBCUTANEOUS at 12:53

## 2018-03-05 RX ADMIN — INSULIN LISPRO 3 UNITS: 100 INJECTION, SOLUTION INTRAVENOUS; SUBCUTANEOUS at 07:10

## 2018-03-05 RX ADMIN — INSULIN GLARGINE 32 UNITS: 100 INJECTION, SOLUTION SUBCUTANEOUS at 09:13

## 2018-03-05 RX ADMIN — Medication 10 ML: at 12:54

## 2018-03-05 RX ADMIN — INSULIN LISPRO 4 UNITS: 100 INJECTION, SOLUTION INTRAVENOUS; SUBCUTANEOUS at 07:11

## 2018-03-05 RX ADMIN — LORAZEPAM 0.5 MG: 2 INJECTION INTRAMUSCULAR; INTRAVENOUS at 10:24

## 2018-03-05 RX ADMIN — CEFTRIAXONE 1 G: 1 INJECTION, POWDER, FOR SOLUTION INTRAMUSCULAR; INTRAVENOUS at 09:13

## 2018-03-05 RX ADMIN — INSULIN LISPRO 4 UNITS: 100 INJECTION, SOLUTION INTRAVENOUS; SUBCUTANEOUS at 12:53

## 2018-03-05 NOTE — PROGRESS NOTES
ID Progress Note  3/5/2018    Subjective:     Afebrile. Feels better. No complaints. Objective:     Vitals:   Visit Vitals    /69 (BP 1 Location: Left arm, BP Patient Position: Sitting)    Pulse 75    Temp 97.8 °F (36.6 °C)    Resp 18    Ht 5' 4.5\" (1.638 m)    Wt 62.7 kg (138 lb 3.7 oz)    LMP 2018    SpO2 97%    BMI 23.36 kg/m2        Tmax:  Temp (24hrs), Av.1 °F (36.7 °C), Min:97.8 °F (36.6 °C), Max:98.3 °F (36.8 °C)      Exam:    Not in distress  Lungs: clear to auscultation  Heart: s1, s2, no murmurs   Abdomen: soft, nontender  No cva tenderness    Labs:   Lab Results   Component Value Date/Time    WBC 7.2 2018 03:21 AM    HGB 14.0 2018 03:21 AM    HCT 39.2 2018 03:21 AM    PLATELET 484  03:21 AM    MCV 92.0 2018 03:21 AM     Lab Results   Component Value Date/Time    Sodium 136 2018 03:21 AM    Potassium 3.4 (L) 2018 03:21 AM    Chloride 100 2018 03:21 AM    CO2 27 2018 03:21 AM    Anion gap 9 2018 03:21 AM    Glucose 202 (H) 2018 03:21 AM    BUN 7 2018 03:21 AM    Creatinine 0.52 (L) 2018 03:21 AM    BUN/Creatinine ratio 13 2018 03:21 AM    GFR est AA >60 2018 03:21 AM    GFR est non-AA >60 2018 03:21 AM    Calcium 9.6 2018 03:21 AM    Bilirubin, total 0.4 2018 05:12 PM    AST (SGOT) 18 2018 05:12 PM    Alk. phosphatase 205 (H) 2018 05:12 PM    Protein, total 9.5 (H) 2018 05:12 PM    Albumin 3.8 2018 05:12 PM    Globulin 5.7 (H) 2018 05:12 PM    A-G Ratio 0.7 (L) 2018 05:12 PM    ALT (SGPT) 23 2018 05:12 PM           Assessment:     1. E coli bacteremia likely nephrologic in origin      2. Bilateral hypodense kidney lesions      3. Type 1 DM, recent ketocacidosis    Recommendations:     She will need ceftriaxone until 3/16. I wll write orders.      Rosa Page MD

## 2018-03-05 NOTE — PROGRESS NOTES
Home IV Orders  1. Diagnosis:   E Coli bacteremia  2. Routine PICC Care  3. Antibiotic:  Ceftriaxone 1 gm daily IV  4. Lab each Monday:   CBC/diff/platelets   CMP     5. Lab each Thursday:   CBC/diff/platelets   BUN   Creatinine    6. Fax lab to Dr. Jag Hill @ [phone].  7.  Call Dr. Jag Hill @ [phone] for fever >100.5, infection at PICC site, diarrhea, WBC > 15 or < 3, cr > 1.8  8. Duration of therapy: last day of therapy should be march 16, 2018   Please call Dr. Jag Hill @ [phone] before stopping therapy. 9.  Allergies:  No Known Allergies  10.  Make appointment in 10 days    Kendal Wolff MD

## 2018-03-05 NOTE — PROGRESS NOTES
Consult received for home infusion. Patient would prefer to have home health, and outpatient infusion as a back up. She will be returning to work as soon as possible. Referral sent to [facility]; waiting for response. [employer] , [place]    11:15 a: Response received from [id number] stating that they do not accept patients' insurance [employer] [facility]). [facility] does not either. Referral sent to [facility]; waiting for response. Sarah Heath BSW, ACM    1:20p: Cm followed up with patient to inform her that I was continuing to work on her home health as well as home infusion, and explained to her the difficulty in arranging it in one day, as well as the barrier of her insurance. Cm informed her that it may or may not happen today. Patient became tearful, stating that she was told she would be discharged today, and would prefer to just go to the outpatient infusion center instead. Cm stated that was perfectly fine, and made much more sense anyway. Cm contacted [facility] and cancelled the referral. Cm communicated with the nurse the change. Cm had already received the [facility] order from Dr. Jerald Aquino, and contacted the [facility] office at [phone]; faxed the order to [phone].     2:30p: Cm was able to arrange outpatient infusion for patient at the [facility] infusion center and have placed the dates and times on the AVS for the next 3 days. Once patient arrives, they will schedule the next several days for her. Cm will give patient the original copy of her infusion order form, copy placed in chart. No further needs.   Mariana Guardado

## 2018-03-05 NOTE — PROCEDURES
PICC Placement Note    PRE-PROCEDURE VERIFICATION  Correct Procedure: yes  Correct Site:  yes  Temperature: Temp: 97.8 °F (36.6 °C), Temperature Source: Temp Source: Oral  Recent Labs      03/05/18   0321   BUN  7   CREA  0.52*   PLT  362   WBC  7.2     Allergies: Review of patient's allergies indicates no known allergies. Education materials, including PICC Booklet, for PICC Care given to patient: yes. See Patient Education activity for further details. PROCEDURE DETAIL  A single lumen PICC line was started for Home IV Therapy. The following documentation is in addition to the PICC properties in the lines/airways flowsheet :  Lot #: [id number]  Was xylocaine 1% used intradermally:  yes  Catheter Length: 38 (cm)  Vein Selection for PICC:right brachial  Central Line Bundle followed yes  Complication Related to Insertion: none    The placement was verified by ECG/Sapiens technology: The tip location is in the superior vena cava. See ECG results for PICC tip placement. Report given to nurse    Isaura Cloud is okay to use.     Nathan Cedillo RN

## 2018-03-05 NOTE — PROGRESS NOTES
PCP office called to verify f/u appt date/time listed on AVS of 3/7/2018. Patient had an appt scheduled for 3/12/2018 with PCP. Patient cancelled appt without rescheduling. Attempted to reach patient via telephone, left message to call this nurse. Awaiting return call.

## 2018-03-05 NOTE — DISCHARGE SUMMARY
Discharge Summary       PATIENT ID: Pratik Palumbo  MRN: [id number]   YOB: 1990    DATE OF ADMISSION: 2/28/2018  5:12 PM    DATE OF DISCHARGE:3/5/2018  PRIMARY CARE PROVIDER: Anaya Diaz MD     DISCHARGING PROVIDER: Citlali Ham MD    To contact this individual call [phone] and ask the  to page. If unavailable ask to be transferred the Adult Hospitalist Department. CONSULTATIONS: IP CONSULT TO HOSPITALIST  IP CONSULT TO INFECTIOUS DISEASES  IP CONSULT TO UROLOGY    PROCEDURES/SURGERIES: * No surgery found *    ADMITTING [address] [place] [address] COURSE:   57-year-old female with past medical history of insulin-dependent diabetes mellitus, who presents to the hospital complaining of the above mentioned symptoms.  The patient reports her symptoms started about two days back. Pt was admitted to [facility] with insulin gtt. She was able to successfully wean off inslin dip to sq inslin with adeq control on lantus 32units daily, ssi and humalog 4 units tidac. She recvd dm education as well. Pt also found to ave acute pyelo on admit and tx initially with iv zosyn. bcx revealed e coli bacteremia resistant to quinolones. ID was c/s with recs for iv abx thru 3/16. Pt was transitioned to rocephin. Repeat bcx neg. Pt recvd picc today and arrangements have been made for iv infusion center to admin her abx. Pt d/c home today. DISCHARGE DIAGNOSES / PLAN:      Sepsis due to E.coli bacteremia   -BCx 2/28 positive for e coli 3 of 4 , started on zosyn  On 3/1. chaged to iv Rocephin as of 3/3( unfortunately is resistant to quinolones). D/w pharmacy to see if any good oral option for e coli bacteremia based upon sensitivities but there is not  -called on call ID dr. Evaristo Jeong to discuss on 3/4.  Confirmed oral abx not an option and pt will req iv abx via picc  -repeat BCx 3/2 ngtd  -Clinically has pyelonephritis  -CT scan abd 3/1 b/l interstitial nephritis,b/l upper pole enhancements, no hydro/abscess  -UA 2/28 normal wbc. UCx <10k growth,not signif enough to call uti  - tamiflu already prev d/c as patient does not have flu . -apprec ID recs, cont zosyn until sensitivities, may need repeat imaging to reeval kidney b/l lesions  -pt seen by uro, no further intervention or imaging unless patient has persisting fevers; f/u uro dr Crista Woodward o/pt  -PICC this am . D[facility]w CM, [facility]. Pt subseq decided not to wait for any further hh and instead has been set up to go to infusion center for abx     Acute Pyelonephritis  -iv zosyn, change to rocephin as of 3/3 based upon sensitivities  -apprec ID recs, cont zosyn until sensitivities, may need repeat imaging to reeval kidney b/l lesions  -pt seen by uro, renal findings typical for acute pyelo. no further intervention or imaging unless patient has persisting fevers; f/u uro dr Crista Woodward o/pt     DKA ,resolved with h/o DM type I. [id number]  -admits to fogetting to take her insulin sometimes  -weaned off insulin gtt  3/2  -Lantus 32units daily on d/c  -SSI norm, humalog 4 units tid ac on d/c (takes 7units tid ac at home)  -apprec DTC recs. DM education recvd     hypokalemia ,resolved  -repleted     6 week preg terminated in dec   Bleeding now stopped        PENDING TEST RESULTS:   At the time of discharge the following test results are still pending: none    FOLLOW UP APPOINTMENTS:    Follow-up Information     Follow up With Details Comments [address] [place] [address] [address], MD   [address] [place] [address] Internal Medicine  [phone]      Mayank Ann MD In 10 days 1-2 wks for bacteremia [facility]  [phone]             ADDITIONAL CARE RECOMMENDATIONS:   Home IV Orders  1. Diagnosis:   E Coli bacteremia  2. Routine PICC Care  3. Antibiotic:  Ceftriaxone 1 gm daily IV  4.   Lab each Monday: CBC/diff/platelets                        CMP      5. Lab each Thursday:                        CBC/diff/platelets                        BUN                        Creatinine     6. Fax lab to Dr. Allison Alfaro @ [phone].  7.  Call Dr. Allison Alfaro @ [phone] for fever >100.5, infection at PICC site, diarrhea, WBC > 15 or < 3, cr > 1.8  8. Duration of therapy: last day of therapy should be march 16, 2018                        Please call Dr. Allison Alfaro @ [phone] before stopping therapy. 9.  Allergies:  No Known Allergies  10. Make appointment in 10 days    DIET: Diabetic Diet    ACTIVITY: Activity as tolerated    WOUND CARE: none    EQUIPMENT needed: none        DISCHARGE MEDICATIONS:  Current Discharge Medication List      CONTINUE these medications which have CHANGED    Details   insulin glargine (LANTUS,BASAGLAR) 100 unit/mL (3 mL) inpn 32 units daily  Qty: 1 Pen, Refills: 1    Comments: Appointment needed for additional refills for chronic medical care management. Associated Diagnoses: Insulin dependent diabetes mellitus type IA (HCC)      insulin lispro (HUMALOG) 100 unit/mL injection 4 units TIDAC plus SSI    INITIATE CORRECTIVE INSULIN PROTOCOL (MAEGAN):  RX MAEGAN Normal Sensitivity (Average weight)    AC (before meals), Q6H, and Q4H CORRECTIONAL SCALE only For Blood Sugar (mg/dl) of :             140-199=2 units            200-249=3 units  250-299=5 units  300-349=7 units  350 or g  Qty: 1 Vial, Refills: 0         CONTINUE these medications which have NOT CHANGED    Details   simvastatin (ZOCOR) 20 mg tablet Take 10 mg by mouth nightly. acetaminophen (TYLENOL) 500 mg tablet Take 1,000 mg by mouth every six (6) hours as needed for Pain or Fever. oseltamivir (TAMIFLU) 75 mg capsule Take 75 mg by mouth two (2) times a day. diphenhydrAMINE (BENADRYL) 25 mg capsule Take 50 mg by mouth every six (6) hours as needed for Itching or Sleep.       doxylamine-dm-acetaminophen (VICKS NYQUIL COLD/FLU LIQUICAP) 6.25- mg cap Take 1 Cap by mouth nightly. naproxen (NAPROSYN) 250 mg tablet Take 500 mg by mouth two (2) times daily as needed for Pain or Fever. ERGOCALCIFEROL, VITAMIN D2, (VITAMIN D PO) Take 1,000 Units by mouth. Associated Diagnoses: Type II or unspecified type diabetes mellitus without mention of complication, uncontrolled; Type I (juvenile type) diabetes mellitus without mention of complication, uncontrolled; Hypoglycemia      aspirin 81 mg tablet Take 81 mg by mouth. Associated Diagnoses: Type II or unspecified type diabetes mellitus without mention of complication, uncontrolled; Type I (juvenile type) diabetes mellitus without mention of complication, uncontrolled; Hypoglycemia               NOTIFY YOUR PHYSICIAN FOR ANY OF THE FOLLOWING:   Fever over 101 degrees for 24 hours. Chest pain, shortness of breath, fever, chills, nausea, vomiting, diarrhea, change in mentation, falling, weakness, bleeding. Severe pain or pain not relieved by medications. Or, any other signs or symptoms that you may have questions about.     DISPOSITION:   x Home With:   OT  PT x HH  RN       Long term SNF/Inpatient Rehab    Independent/assisted living    Hospice    Other:       PATIENT CONDITION AT DISCHARGE:     Functional status    Poor     Deconditioned    x Independent      Cognition   x  Lucid     Forgetful     Dementia      Catheters/lines (plus indication)    Young    x PICC -bacteremia    PEG     None      Code status   x  Full code     DNR      PHYSICAL EXAMINATION AT DISCHARGE:   Refer to Progress Note      CHRONIC MEDICAL DIAGNOSES:  Problem List as of 3/5/2018  Date Reviewed: 2/28/2018          Codes Class Noted - Resolved    * (Principal)DKA (diabetic ketoacidoses) ([facility] ICD-10-CM: E13.10  ICD-9-CM: 250.10  2/28/2018 - Present        Borderline personality disorder ICD-10-CM: F60.3  ICD-9-CM: 301.83  2/14/2017 - Present        Insulin dependent diabetes mellitus type IA ([facility] ICD-10-CM: E10.9  ICD-9-CM: 250.01  2/14/2017 - Present        Generalized anxiety disorder ICD-10-CM: F41.1  ICD-9-CM: 300.02  2/14/2017 - Present        Type I (juvenile type) diabetes mellitus without mention of complication, uncontrolled ICD-10-CM: E10.65  ICD-9-CM: 250.03  10/11/2011 - Present              Greater than 35  minutes were spent with the patient on counseling and coordination of care    Signed:   Ankita Ordoñez MD  3/5/2018  1:04 PM

## 2018-03-05 NOTE — CDMP QUERY
Please clarify if this patient is (was) being treated/managed for:     => Sepsis (POA) as evidence by 2/4 SIRS , + Blood cultures Gram negative Rods, requiring IVF, Blood CX, F  => Other explanation of clinical findings  => Unable to determine (no explanation for clinical findings)    The medical record reflects the following clinical findings, treatment, and risk factors. Risk Factors:   IDDM, Probable Flu  Clinical Indicators:  2/4 SIRS (Hr @ 104, WBC @ 20) ---Blood cx. 2/4 + Gram negative Rods  Treatment:     Please clarify and document your clinical opinion in the progress notes and discharge summary including the definitive and/or presumptive diagnosis, (suspected or probable), related to the above clinical findings. Please include clinical findings supporting your diagnosis.       Thank you,  Taylor Gar, BSN, RN, Manuel Quick  [phone]

## 2018-03-05 NOTE — PROGRESS NOTES
Order for PICC verified. Consent obtained after risks, benefits, and procedure was explained to patient. Time given to answer questions and/or concerns. Per ID note, patient given option for PICC vs Midline. Patient reports she thinks a PICC line will best for her. Patient requesting antianxiety meds prior to procedure, primary nurse, Chacha Mattson, JOSSUE, aware.      Janna Chiu RN  Vascular Access Team

## 2018-03-05 NOTE — DISCHARGE INSTRUCTIONS
Discharge Instructions       PATIENT ID: Debora El  MRN: [id number]   YOB: 1990    DATE OF ADMISSION: 2/28/2018  5:12 PM    DATE OF DISCHARGE: 3/5/2018    PRIMARY CARE PROVIDER: Carollee Hodgkin, MD     ATTENDING PHYSICIAN: Darby Crigler, MD;Jhoan*  DISCHARGING PROVIDER: Shey Mahmood MD    To contact this individual call [phone] and ask the  to page. If unavailable ask to be transferred the Adult Hospitalist Department. DISCHARGE DIAGNOSES   DKA,resolved  uncontrolled DM I, HbA1c 11.0  E.coli bacteremia  Acute pyelonephritis    CONSULTATIONS: IP CONSULT TO HOSPITALIST  IP CONSULT TO INFECTIOUS DISEASES  IP CONSULT TO UROLOGY    PROCEDURES/SURGERIES: * No surgery found *    PENDING TEST RESULTS:   At the time of discharge the following test results are still pending: none    FOLLOW UP APPOINTMENTS:   Follow-up Information     Follow up With Details Comments Contact Info    Carollee Hodgkin, MD   [address] [place] [address] Internal Medicine  [place] [phone] Meme Nolen MD In 2 weeks 1-2 wks for bacteremia [address] [place] [phone]             ADDITIONAL CARE RECOMMENDATIONS:   Home IV Orders  1. Diagnosis:   E Coli bacteremia  2. Routine PICC Care  3. Antibiotic:  Ceftriaxone 1 gm daily IV  4. Lab each Monday:                        CBC/diff/platelets                        CMP      5. Lab each Thursday:                        CBC/diff/platelets                        BUN                        Creatinine     6. Fax lab to Dr. Lonita Frankel @ [phone].  7.  Call Dr. Lonita Frankel @ [phone] for fever >100.5, infection at PICC site, diarrhea, WBC > 15 or < 3, cr > 1.8  8. Duration of therapy: last day of therapy should be march 16, 2018                        Please call Dr. Lonita Frankel @ [phone] before stopping therapy. 9.  Allergies:  No Known Allergies  10. Make appointment in 10 days    DIET: Diabetic Diet    ACTIVITY: Activity as tolerated    WOUND CARE: none    EQUIPMENT needed: none      DISCHARGE MEDICATIONS:   See Medication Reconciliation Form    · It is important that you take the medication exactly as they are prescribed. · Keep your medication in the bottles provided by the pharmacist and keep a list of the medication names, dosages, and times to be taken in your wallet. · Do not take other medications without consulting your doctor. NOTIFY YOUR PHYSICIAN FOR ANY OF THE FOLLOWING:   Fever over 101 degrees for 24 hours. Chest pain, shortness of breath, fever, chills, nausea, vomiting, diarrhea, change in mentation, falling, weakness, bleeding. Severe pain or pain not relieved by medications. Or, any other signs or symptoms that you may have questions about.       DISPOSITION:   x Home With:   OT  PT x HH  RN       SNF/Inpatient Rehab/LTAC    Independent/assisted living    Hospice    Other:     CDMP Checked:   Yes x     PROBLEM LIST Updated:  Yes x       Signed:   Miki Henderson MD  3/5/2018  1:01 PM

## 2018-03-05 NOTE — PROGRESS NOTES
Received return call from patient and verified identity with /ADDRESS. Patient informed of need for hosp f/u. Appt scheduled for 2018 @ 12 NOON.

## 2018-03-06 ENCOUNTER — HOSPITAL ENCOUNTER (OUTPATIENT)
Dept: INFUSION THERAPY | Age: 28
Discharge: HOME | End: 2018-03-06
Payer: COMMERCIAL

## 2018-03-06 ENCOUNTER — PATIENT OUTREACH (OUTPATIENT)
Dept: INTERNAL MEDICINE CLINIC | Age: 28
End: 2018-03-06

## 2018-03-06 VITALS
RESPIRATION RATE: 18 BRPM | DIASTOLIC BLOOD PRESSURE: 77 MMHG | HEART RATE: 95 BPM | TEMPERATURE: 98.1 F | SYSTOLIC BLOOD PRESSURE: 121 MMHG

## 2018-03-06 LAB
BACTERIA SPEC CULT: ABNORMAL
BACTERIA SPEC CULT: ABNORMAL
SERVICE CMNT-IMP: ABNORMAL

## 2018-03-06 PROCEDURE — 96365 THER/PROPH/DIAG IV INF INIT: CPT

## 2018-03-06 PROCEDURE — 74011250636 HC RX REV CODE- 250/636: Performed by: INTERNAL MEDICINE

## 2018-03-06 PROCEDURE — 74011000258 HC RX REV CODE- 258: Performed by: INTERNAL MEDICINE

## 2018-03-06 RX ORDER — HEPARIN 100 UNIT/ML
500 SYRINGE INTRAVENOUS AS NEEDED
Status: ACTIVE | OUTPATIENT
Start: 2018-03-06 | End: 2018-03-07

## 2018-03-06 RX ORDER — SODIUM CHLORIDE 900 MG/100ML
INJECTION INTRAVENOUS
Status: DISPENSED
Start: 2018-03-06 | End: 2018-03-07

## 2018-03-06 RX ORDER — SODIUM CHLORIDE 0.9 % (FLUSH) 0.9 %
10 SYRINGE (ML) INJECTION AS NEEDED
Status: ACTIVE | OUTPATIENT
Start: 2018-03-06 | End: 2018-03-07

## 2018-03-06 RX ORDER — CEFTRIAXONE 1 G/1
INJECTION, POWDER, FOR SOLUTION INTRAMUSCULAR; INTRAVENOUS
Status: DISPENSED
Start: 2018-03-06 | End: 2018-03-07

## 2018-03-06 RX ADMIN — SODIUM CHLORIDE, PRESERVATIVE FREE 500 UNITS: 5 INJECTION INTRAVENOUS at 16:57

## 2018-03-06 RX ADMIN — Medication 10 ML: at 16:20

## 2018-03-06 RX ADMIN — Medication 10 ML: at 16:56

## 2018-03-06 RX ADMIN — Medication 10 ML: at 16:57

## 2018-03-06 RX ADMIN — SODIUM CHLORIDE 1 G: 900 INJECTION, SOLUTION INTRAVENOUS at 16:25

## 2018-03-06 NOTE — PROGRESS NOTES
NN returned call to patient for follow up. Upon answering and asking who is calling, this writer introduced myself and patient states \"I have to go sorry bye\" and hung up the line. This note will not be viewable in [address]

## 2018-03-06 NOTE — PROGRESS NOTES
OPIC VISIT NOTE    0864  Pt arrived at [facility] ambulatory and in no distress for IV Rocephin. Assessment completed, no new complaints at this time. Was discharged from the hospital yesterday. SL PICC line flushed with positive blood return. Medications received:  Rocephin IV    Blood pressure 121/77, pulse 95, temperature 98.1 °F (36.7 °C), resp. rate 18. Tolerated treatment well, no adverse reaction noted. 1700  D/C'd from [facility] ambulatory and in no distress. Next appointment is tomorrow.

## 2018-03-06 NOTE — PROGRESS NOTES
NN attempted to reach patient for post discharge follow up. Unable to reach patient and left VM requesting return call with NN's contact info provided. Will attempt to reach again tomorrow if no return call. Goals      Attends follow-up appointments as directed. 3/6/18- PCP follow up appt scheduled for 3/9/18.  CLAUDE

## 2018-03-06 NOTE — Clinical Note
GUCCI on Thursday. . Unable to reach today will try again tomorrow. Has Picc line in and on ABX at infusion center for ecoli bacteremia and acute pyelo - went into brief DKA but resolved.  LN

## 2018-03-07 ENCOUNTER — PATIENT OUTREACH (OUTPATIENT)
Dept: INTERNAL MEDICINE CLINIC | Age: 28
End: 2018-03-07

## 2018-03-07 ENCOUNTER — HOSPITAL ENCOUNTER (OUTPATIENT)
Dept: INFUSION THERAPY | Age: 28
Discharge: HOME | End: 2018-03-07
Payer: COMMERCIAL

## 2018-03-07 VITALS
DIASTOLIC BLOOD PRESSURE: 74 MMHG | HEART RATE: 81 BPM | TEMPERATURE: 97.8 F | RESPIRATION RATE: 18 BRPM | SYSTOLIC BLOOD PRESSURE: 113 MMHG

## 2018-03-07 LAB
BACTERIA SPEC CULT: NORMAL
SERVICE CMNT-IMP: NORMAL

## 2018-03-07 PROCEDURE — 96365 THER/PROPH/DIAG IV INF INIT: CPT

## 2018-03-07 PROCEDURE — 74011000258 HC RX REV CODE- 258: Performed by: INTERNAL MEDICINE

## 2018-03-07 PROCEDURE — 74011250636 HC RX REV CODE- 250/636: Performed by: INTERNAL MEDICINE

## 2018-03-07 RX ADMIN — SODIUM CHLORIDE 1 G: 900 INJECTION INTRAVENOUS at 15:40

## 2018-03-07 NOTE — PROGRESS NOTES
1515 Pt admit to [facility] for Rocephin ambulatory in stable condition. Assessment completed. No new concerns voiced. PICC with positive blood return. Visit Vitals    /74    Pulse 81    Temp 97.8 °F (36.6 °C)    Resp 18       Medications:  Rocephin  Heparin Flush    1620 Pt tolerated treatment well. PICC maintained positive blood return throughout treatment, flushed with positive blood return at conclusion and PICC heparinized and curos cap placed on end clave. D/c home ambulatory in no distress. Pt aware of next [facility]IC appointment scheduled for 3/8/18.

## 2018-03-07 NOTE — Clinical Note
GUCCI call done- she called me back said she hurried and hung up yesterday b/c she was at the infusion center for her daily infusion. Please see note. Will be in on Friday. Thanks!  Pj Horn

## 2018-03-07 NOTE — PROGRESS NOTES
Hospital Discharge Follow-Up      Date/Time:  3/7/2018 4:22 PM    Inpatient RRAT score: Low Risk            5       Total Score        3 Has Seen PCP in Last 6 Months (Yes=3, No=0)    2 . Living with Significant Other. Assisted Living. LTAC. SNF. or   Rehab        Criteria that do not apply:    Patient Length of Stay (>5 days = 3)    IP Visits Last 12 Months (1-3=4, 4=9, >4=11)    Pt. Coverage (Medicare=5 , Medicaid, or Self-Pay=4)    Charlson Comorbidity Score (Age + Comorbid Conditions)        Patient was admitted to [facility] on 18 and discharged on 3/5/18 for DKA, Acute pyelonephritis, E. Coli bacteremia (resistant to quinolones) . Patient contacted within 2 days of discharge. (Patient reports hung up on the call yesterday as she was arriving at the infusion center during the time of NN's call)     Nurse Navigator(NN) contacted the patient by telephone to perform post hospital discharge assessment. Verified name and  with patient as identifiers. Provided introduction to self, and explanation of the Nurses Navigator role. Reviewed discharge instructions and red flags with patient, and patient verbalizes understanding. Patient given an opportunity to ask questions and does not have any further questions or concerns at this time. The patient agrees to contact the PCP office for questions related to their healthcare. NN provided contact information to the patient for future reference. Patients top problems:  1. E coli Bacteremia- Patient has PICC line in place. Goes to the [facility] daily for IV Rocephin infusion. This will conclude on 3/16/18. Patient reports understanding of monitoring PICC line for sx's of infection. Patient reports feeling much better and she checks her temperature daily and has not had any fevers since d/c. She is having labs checked weekly at [facility] also. To follow up with ID Dr Hiram Alcazar in 10 days and will call to schedule this tomorrow.      2. Acute Pyelonephritis- On IV Rocephin via PICC at [facility] daily. Denies any further urinary sx's. She is feeling better. 3.DKA- Patient closely monitoring blood sugars at home and understands that optimal sugar control will help her to heal from infection. Her blood sugar has not once been over 200 and she reports checking it diligently. A! C 11.0 on 3/3/18. Needs DM education/ reinforcement. ? Referral to DTC? Home Health orders at discharge and company being used? No / patient going to [facility] daily for PICC care and IV ABX - our call was brief today as patient was at the center and concluding daily infusion. Additional orders/further diagnostics post discharge: Labs to be done at [facility] per  This note will not be viewable in [address] [place] CBC/WBC WNL on 3/5 .  on CMP otherwise stable labs done on 3/5     Barriers to care? None    Abnormal labs: blood sugar 202 on 3/5/18, HGB A1C was 11.0 on 3/3/18. Medication:   Performed medication reconciliation with patient, and patient verbalizes understanding of administration of home medications. There were no barriers to obtaining medications identified at this time. New medications at discharge include Insulin changed taking Lantus 32 units and Humalog 4 units with meals as well as ss with meals in addition to 4 units. Prescription Medication total: 7 (pharmacy consult for polypharm needed?) no   Medication changes (dose adjustments or discontinued meds): Insulins were adjusted     Advance Care Planning:   Does patient have an Advance Directive:  no  If no, was patient was offered the opportunity to discuss advance care planning:  Not today- address at office follow up. PCP/Specialist follow up: Patient scheduled to follow up with El Coleman MD on 3/9/18. Encouraged to call and schedule with ID Dr Archie Merrill tomorrow for appt with in 10 days of d/c.        Goals      Attends follow-up appointments as directed.             3/7/18- Patient confirmed PCP appt on Friday. Has not scheduled follow up with ID Dr. Librado Waldron yet and was encouraged to call tomorrow to schedule this within 10 days of discharge. LN    3/6/18- PCP follow up appt scheduled for 3/9/18. LN       Resolution of pyelonephritis/bacteremia/ completion of IV antibiotic infusions            3/7/18 - patient reports no fevers, no urinary sx's . Receives daily infusion at the infusion center of [facility] which will conclude on 3/16/18.  LN

## 2018-03-08 ENCOUNTER — HOSPITAL ENCOUNTER (OUTPATIENT)
Dept: INFUSION THERAPY | Age: 28
Discharge: HOME | End: 2018-03-08
Payer: COMMERCIAL

## 2018-03-08 VITALS
SYSTOLIC BLOOD PRESSURE: 122 MMHG | OXYGEN SATURATION: 98 % | TEMPERATURE: 98 F | HEART RATE: 85 BPM | RESPIRATION RATE: 18 BRPM | DIASTOLIC BLOOD PRESSURE: 72 MMHG

## 2018-03-08 LAB
ALBUMIN SERPL-MCNC: 3 G/DL (ref 3.5–5)
ALBUMIN/GLOB SERPL: 0.8 {RATIO} (ref 1.1–2.2)
ALP SERPL-CCNC: 102 U/L (ref 45–117)
ALT SERPL-CCNC: 26 U/L (ref 12–78)
ANION GAP SERPL CALC-SCNC: 8 MMOL/L (ref 5–15)
AST SERPL-CCNC: 10 U/L (ref 15–37)
BASOPHILS # BLD: 0.1 K/UL (ref 0–0.1)
BASOPHILS NFR BLD: 1 % (ref 0–1)
BILIRUB DIRECT SERPL-MCNC: 0.1 MG/DL (ref 0–0.2)
BILIRUB SERPL-MCNC: 0.3 MG/DL (ref 0.2–1)
BUN SERPL-MCNC: 13 MG/DL (ref 6–20)
BUN/CREAT SERPL: 15 (ref 12–20)
CALCIUM SERPL-MCNC: 9.2 MG/DL (ref 8.5–10.1)
CHLORIDE SERPL-SCNC: 97 MMOL/L (ref 97–108)
CO2 SERPL-SCNC: 28 MMOL/L (ref 21–32)
CREAT SERPL-MCNC: 0.84 MG/DL (ref 0.55–1.02)
DIFFERENTIAL METHOD BLD: ABNORMAL
EOSINOPHIL # BLD: 0.4 K/UL (ref 0–0.4)
EOSINOPHIL NFR BLD: 5 % (ref 0–7)
ERYTHROCYTE [DISTWIDTH] IN BLOOD BY AUTOMATED COUNT: 11.9 % (ref 11.5–14.5)
GLOBULIN SER CALC-MCNC: 3.8 G/DL (ref 2–4)
GLUCOSE SERPL-MCNC: 380 MG/DL (ref 65–100)
HCT VFR BLD AUTO: 37.9 % (ref 35–47)
HGB BLD-MCNC: 13 G/DL (ref 11.5–16)
IMM GRANULOCYTES # BLD: 0 K/UL (ref 0–0.04)
IMM GRANULOCYTES NFR BLD AUTO: 1 % (ref 0–0.5)
LYMPHOCYTES # BLD: 2 K/UL (ref 0.8–3.5)
LYMPHOCYTES NFR BLD: 26 % (ref 12–49)
MCH RBC QN AUTO: 32.7 PG (ref 26–34)
MCHC RBC AUTO-ENTMCNC: 34.3 G/DL (ref 30–36.5)
MCV RBC AUTO: 95.2 FL (ref 80–99)
MONOCYTES # BLD: 0.9 K/UL (ref 0–1)
MONOCYTES NFR BLD: 11 % (ref 5–13)
NEUTS SEG # BLD: 4.5 K/UL (ref 1.8–8)
NEUTS SEG NFR BLD: 57 % (ref 32–75)
NRBC # BLD: 0 K/UL (ref 0–0.01)
NRBC BLD-RTO: 0 PER 100 WBC
PLATELET # BLD AUTO: 488 K/UL (ref 150–400)
PMV BLD AUTO: 8.9 FL (ref 8.9–12.9)
POTASSIUM SERPL-SCNC: 4.3 MMOL/L (ref 3.5–5.1)
PROT SERPL-MCNC: 6.8 G/DL (ref 6.4–8.2)
RBC # BLD AUTO: 3.98 M/UL (ref 3.8–5.2)
SODIUM SERPL-SCNC: 133 MMOL/L (ref 136–145)
WBC # BLD AUTO: 7.9 K/UL (ref 3.6–11)

## 2018-03-08 PROCEDURE — 74011250636 HC RX REV CODE- 250/636: Performed by: INTERNAL MEDICINE

## 2018-03-08 PROCEDURE — 80048 BASIC METABOLIC PNL TOTAL CA: CPT | Performed by: INTERNAL MEDICINE

## 2018-03-08 PROCEDURE — 80076 HEPATIC FUNCTION PANEL: CPT | Performed by: INTERNAL MEDICINE

## 2018-03-08 PROCEDURE — 36415 COLL VENOUS BLD VENIPUNCTURE: CPT | Performed by: INTERNAL MEDICINE

## 2018-03-08 PROCEDURE — 74011000258 HC RX REV CODE- 258: Performed by: INTERNAL MEDICINE

## 2018-03-08 PROCEDURE — 85025 COMPLETE CBC W/AUTO DIFF WBC: CPT | Performed by: INTERNAL MEDICINE

## 2018-03-08 PROCEDURE — 96365 THER/PROPH/DIAG IV INF INIT: CPT

## 2018-03-08 RX ORDER — SODIUM CHLORIDE 0.9 % (FLUSH) 0.9 %
10-40 SYRINGE (ML) INJECTION AS NEEDED
Status: CANCELLED | OUTPATIENT
Start: 2018-03-08 | End: 2018-03-09

## 2018-03-08 RX ORDER — HEPARIN 100 UNIT/ML
500 SYRINGE INTRAVENOUS AS NEEDED
Status: ACTIVE | OUTPATIENT
Start: 2018-03-08 | End: 2018-03-09

## 2018-03-08 RX ORDER — SODIUM CHLORIDE 9 MG/ML
25 INJECTION, SOLUTION INTRAVENOUS AS NEEDED
Status: CANCELLED | OUTPATIENT
Start: 2018-03-08 | End: 2018-03-09

## 2018-03-08 RX ORDER — SODIUM CHLORIDE 0.9 % (FLUSH) 0.9 %
10-40 SYRINGE (ML) INJECTION AS NEEDED
Status: ACTIVE | OUTPATIENT
Start: 2018-03-08 | End: 2018-03-09

## 2018-03-08 RX ORDER — HEPARIN 100 UNIT/ML
500 SYRINGE INTRAVENOUS AS NEEDED
Status: CANCELLED | OUTPATIENT
Start: 2018-03-08 | End: 2018-03-09

## 2018-03-08 RX ADMIN — Medication 10 ML: at 16:58

## 2018-03-08 RX ADMIN — SODIUM CHLORIDE, PRESERVATIVE FREE 500 UNITS: 5 INJECTION INTRAVENOUS at 16:59

## 2018-03-08 RX ADMIN — Medication 10 ML: at 16:20

## 2018-03-08 RX ADMIN — SODIUM CHLORIDE 1 G: 900 INJECTION INTRAVENOUS at 16:20

## 2018-03-08 NOTE — PROGRESS NOTES
1550 Pt admit to [facility] for Daily Rocephin ambulatory in stable condition. Assessment completed. No new concerns voiced. Single Lumen PICC with positive blood return. Labs drawn per order and sent. Visit Vitals    /72    Pulse 85    Temp 98 °F (36.7 °C)    Resp 18    SpO2 98%    Breastfeeding No       Medications:  Normal Saline Flush  Rocephin  Heparin Flush    1700 Pt tolerated treatment well. PICC maintained positive blood return throughout treatment, flushed with positive blood return at conclusion and heparinized per protocol. Curos cap placed on end clave. D/c home ambulatory in no distress. Pt aware of next [facility] appointment scheduled for 3/9/18. Recent Results (from the past 12 hour(s))   CBC WITH AUTOMATED DIFF    Collection Time: 03/08/18  4:24 PM   Result Value Ref Range    WBC 7.9 3.6 - 11.0 K/uL    RBC 3.98 3.80 - 5.20 M/uL    HGB 13.0 11.5 - 16.0 g/dL    HCT 37.9 35.0 - 47.0 %    MCV 95.2 80.0 - 99.0 FL    MCH 32.7 26.0 - 34.0 PG    MCHC 34.3 30.0 - 36.5 g/dL    RDW 11.9 11.5 - 14.5 %    PLATELET 647 (H) 886 - 400 K/uL    MPV 8.9 8.9 - 12.9 FL    NRBC 0.0 0  WBC    ABSOLUTE NRBC 0.00 0.00 - 0.01 K/uL    NEUTROPHILS 57 32 - 75 %    LYMPHOCYTES 26 12 - 49 %    MONOCYTES 11 5 - 13 %    EOSINOPHILS 5 0 - 7 %    BASOPHILS 1 0 - 1 %    IMMATURE GRANULOCYTES 1 (H) 0.0 - 0.5 %    ABS. NEUTROPHILS 4.5 1.8 - 8.0 K/UL    ABS. LYMPHOCYTES 2.0 0.8 - 3.5 K/UL    ABS. MONOCYTES 0.9 0.0 - 1.0 K/UL    ABS. EOSINOPHILS 0.4 0.0 - 0.4 K/UL    ABS. BASOPHILS 0.1 0.0 - 0.1 K/UL    ABS. IMM.  GRANS. 0.0 0.00 - 0.04 K/UL    DF AUTOMATED     METABOLIC PANEL, BASIC    Collection Time: 03/08/18  4:24 PM   Result Value Ref Range    Sodium 133 (L) 136 - 145 mmol/L    Potassium 4.3 3.5 - 5.1 mmol/L    Chloride 97 97 - 108 mmol/L    CO2 28 21 - 32 mmol/L    Anion gap 8 5 - 15 mmol/L    Glucose 380 (H) 65 - 100 mg/dL    BUN 13 6 - 20 MG/DL    Creatinine 0.84 0.55 - 1.02 MG/DL    BUN/Creatinine ratio 15 12 - 20 GFR est AA >60 >60 ml/min/1.73m2    GFR est non-AA >60 >60 ml/min/1.73m2    Calcium 9.2 8.5 - 10.1 MG/DL   HEPATIC FUNCTION PANEL    Collection Time: 03/08/18  4:24 PM   Result Value Ref Range    Protein, total 6.8 6.4 - 8.2 g/dL    Albumin 3.0 (L) 3.5 - 5.0 g/dL    Globulin 3.8 2.0 - 4.0 g/dL    A-G Ratio 0.8 (L) 1.1 - 2.2      Bilirubin, total 0.3 0.2 - 1.0 MG/DL    Bilirubin, direct 0.1 0.0 - 0.2 MG/DL    Alk.  phosphatase 102 45 - 117 U/L    AST (SGOT) 10 (L) 15 - 37 U/L    ALT (SGPT) 26 12 - 78 U/L

## 2018-03-09 ENCOUNTER — OFFICE VISIT (OUTPATIENT)
Dept: INTERNAL MEDICINE CLINIC | Age: 28
End: 2018-03-09

## 2018-03-09 ENCOUNTER — HOSPITAL ENCOUNTER (OUTPATIENT)
Dept: INFUSION THERAPY | Age: 28
Discharge: HOME | End: 2018-03-09
Payer: COMMERCIAL

## 2018-03-09 VITALS
RESPIRATION RATE: 18 BRPM | HEART RATE: 96 BPM | DIASTOLIC BLOOD PRESSURE: 73 MMHG | SYSTOLIC BLOOD PRESSURE: 116 MMHG | TEMPERATURE: 98.6 F

## 2018-03-09 VITALS
SYSTOLIC BLOOD PRESSURE: 106 MMHG | HEIGHT: 65 IN | TEMPERATURE: 98.3 F | WEIGHT: 137 LBS | RESPIRATION RATE: 16 BRPM | HEART RATE: 88 BPM | BODY MASS INDEX: 22.82 KG/M2 | DIASTOLIC BLOOD PRESSURE: 72 MMHG | OXYGEN SATURATION: 96 %

## 2018-03-09 DIAGNOSIS — A41.51 SEPSIS DUE TO ESCHERICHIA COLI (HCC): ICD-10-CM

## 2018-03-09 DIAGNOSIS — E10.9 INSULIN DEPENDENT DIABETES MELLITUS TYPE IA (HCC): Primary | ICD-10-CM

## 2018-03-09 PROCEDURE — 74011250636 HC RX REV CODE- 250/636: Performed by: INTERNAL MEDICINE

## 2018-03-09 PROCEDURE — 96365 THER/PROPH/DIAG IV INF INIT: CPT

## 2018-03-09 PROCEDURE — 74011000258 HC RX REV CODE- 258: Performed by: INTERNAL MEDICINE

## 2018-03-09 RX ORDER — HEPARIN 100 UNIT/ML
500 SYRINGE INTRAVENOUS AS NEEDED
Status: ACTIVE | OUTPATIENT
Start: 2018-03-09 | End: 2018-03-10

## 2018-03-09 RX ORDER — SODIUM CHLORIDE 0.9 % (FLUSH) 0.9 %
5-10 SYRINGE (ML) INJECTION AS NEEDED
Status: ACTIVE | OUTPATIENT
Start: 2018-03-09 | End: 2018-03-10

## 2018-03-09 RX ADMIN — SODIUM CHLORIDE 1 G: 900 INJECTION INTRAVENOUS at 10:33

## 2018-03-09 RX ADMIN — Medication 10 ML: at 10:59

## 2018-03-09 RX ADMIN — SODIUM CHLORIDE, PRESERVATIVE FREE 500 UNITS: 5 INJECTION INTRAVENOUS at 10:59

## 2018-03-09 NOTE — PROGRESS NOTES
Patient was admitted to hospital for a UTI that spread infection throughout body. 28-03/15/18. Patient is going to infusion center for antibiotics(Rocephin) until 18. Patient had keto-acidosis. Patient had an  in December causing her to have a cycle most of the month of Feb.    Visit Vitals    /72 (BP 1 Location: Left arm, BP Patient Position: Sitting)    Pulse 88    Temp 98.3 °F (36.8 °C) (Oral)    Resp 16    Ht 5' 4.5\" (1.638 m)    Wt 137 lb (62.1 kg)    SpO2 96%    BMI 23.15 kg/m2     1. Have you been to the ER, urgent care clinic since your last visit? Hospitalized since your last visit? Yes -18    2. Have you seen or consulted any other health care providers outside of the [address] [place] [address] since your last visit? Include any pap smears or colon screening.  no

## 2018-03-09 NOTE — PROGRESS NOTES
Written by Dion Triana, as dictated by Dr. Lisa Coates MD.    Mica Holliday is a 24 y.o. female. HPI  Ms. Evelyn Chandra is a 24y.o. year old female, she is seen today for Transition of Care services following a hospital discharge for DKA/sepsis on . Our office Nurse Navigator performed an outreach to Ms. Alek Restrepo on  (within 2 business days of discharge) to complete medication reconciliation and a telephonic assessment of her condition. She was admitted with urosepsis on . She is very upset that she was not getting good care from our [facility]  and thinks this is why she had to go to the hospital, even though she has been noncompliant with her insulin. When she started coming to the office we had put referral to  an endo due to her type 1 diabetes and insulin pump, but she never made an appointment. She was seen in the office a couple times in 2018 with vaginal discharge after her elective   and was given Flagyl and Diflucan. In 2018 [facility] went to [facility] as she was not feeling well and was prescribed Tamiflu, of which she took one but her sxs worsened so she went to the hospital, where they found she had DKA and urosepsis. She is upset that her infection was missed and wants to go to endocrinology for diabetes management, and wants to see another PCP. I did review her labs from ID which were done yesterday, and her BS was 380. I asked if she had been taking insulin, and she became irritated: she does admit she has not been eating well and knows she has to follow the insulin sliding scale. She does not want me to make any changes in her medication at this time. She will be seeing Dr. Charla Murphy (endo) for diabetes management.      She was upset with the office and wished to speak with the , but when I said I would bring the  into the room she refused and said she no longer wanted to see anyone in the office. Patient Active Problem List   Diagnosis Code    Type I (juvenile type) diabetes mellitus without mention of complication, uncontrolled E10.65    Borderline personality disorder F60.3    Insulin dependent diabetes mellitus type IA ([facility] E10.9    Generalized anxiety disorder F41.1    DKA (diabetic ketoacidoses) ([facility]) E13.10        Current Outpatient Prescriptions on File Prior to Visit   Medication Sig Dispense Refill    insulin glargine (LANTUS,BASAGLAR) 100 unit/mL (3 mL) inpn 32 units daily 1 Pen 1    insulin lispro (HUMALOG) 100 unit/mL injection 4 units TIDAC plus SSI    INITIATE CORRECTIVE INSULIN PROTOCOL (MAEGAN):  RX MAEGAN Normal Sensitivity (Average weight)    AC (before meals), Q6H, and Q4H CORRECTIONAL SCALE only For Blood Sugar (mg/dl) of :             140-199=2 units            200-249=3 units  250-299=5 units  300-349=7 units  350 or g 1 Vial 0    simvastatin (ZOCOR) 20 mg tablet Take 10 mg by mouth nightly.  acetaminophen (TYLENOL) 500 mg tablet Take 1,000 mg by mouth every six (6) hours as needed for Pain or Fever.  naproxen (NAPROSYN) 250 mg tablet Take 500 mg by mouth two (2) times daily as needed for Pain or Fever.  ERGOCALCIFEROL, VITAMIN D2, (VITAMIN D PO) Take 1,000 Units by mouth.  aspirin 81 mg tablet Take 81 mg by mouth.  oseltamivir (TAMIFLU) 75 mg capsule Take 75 mg by mouth two (2) times a day.  doxylamine-dm-acetaminophen (VICKS NYQUIL COLD/FLU LIQUICAP) 6.25- mg cap Take 1 Cap by mouth nightly.        Current Facility-Administered Medications on File Prior to Visit   Medication Dose Route Frequency Provider Last Rate Last Dose    heparin (porcine) pf 500 Units  500 Units IntraVENous PRN Floyd Herring MD   500 Units at 03/08/18 1659    sodium chloride (NS) flush 10-40 mL  10-40 mL IntraVENous PRN Drea VICTORIA MD   10 mL at 03/08/18 1658    [COMPLETED] cefTRIAXone (ROCEPHIN) 1 g in 0.9% sodium chloride (MBP/ADV) 50 mL MBP  1 g IntraVENous ONCE 8570 Meme Nolen MD   Stopped at 18 3246       Past Medical History:   Diagnosis Date         Diabetes mellitus        Family History   Problem Relation Age of Onset    Cancer Paternal Grandfather        Social History     Social History    Marital status: SINGLE     Spouse name: N/A    Number of children: N/A    Years of education: N/A     Occupational History    Not on file. Social History Main Topics    Smoking status: Former Smoker     Packs/day: 0.00    Smokeless tobacco: Never Used      Comment: quit date 17    Alcohol use No    Drug use: No    Sexual activity: Yes     Partners: Male     Other Topics Concern    Not on file     Social History Narrative       Hospital Outpatient Visit on 2018   Component Date Value Ref Range Status    WBC 2018 7.9  3.6 - 11.0 K/uL Final    RBC 2018 3.98  3.80 - 5.20 M/uL Final    HGB 2018 13.0  11.5 - 16.0 g/dL Final    HCT 2018 37.9  35.0 - 47.0 % Final    MCV 2018 95.2  80.0 - 99.0 FL Final    MCH 2018 32.7  26.0 - 34.0 PG Final    MCHC 2018 34.3  30.0 - 36.5 g/dL Final    RDW 2018 11.9  11.5 - 14.5 % Final    PLATELET  817* 150 - 400 K/uL Final    MPV 2018 8.9  8.9 - 12.9 FL Final    NRBC 2018 0.0  0  WBC Final    ABSOLUTE NRBC 2018 0.00  0.00 - 0.01 K/uL Final    NEUTROPHILS 2018 57  32 - 75 % Final    LYMPHOCYTES 2018 26  12 - 49 % Final    MONOCYTES 2018 11  5 - 13 % Final    EOSINOPHILS 2018 5  0 - 7 % Final    BASOPHILS 2018 1  0 - 1 % Final    IMMATURE GRANULOCYTES 2018 1* 0.0 - 0.5 % Final    ABS. NEUTROPHILS 2018 4.5  1.8 - 8.0 K/UL Final    ABS. LYMPHOCYTES 2018 2.0  0.8 - 3.5 K/UL Final    ABS. MONOCYTES 2018 0.9  0.0 - 1.0 K/UL Final    ABS. EOSINOPHILS 2018 0.4  0.0 - 0.4 K/UL Final    ABS.  BASOPHILS 2018 0.1  0.0 - 0.1 K/UL Final    ABS. IMM. GRANS. 03/08/2018 0.0  0.00 - 0.04 K/UL Final    DF 03/08/2018 AUTOMATED    Final    Sodium 03/08/2018 133* 136 - 145 mmol/L Final    Potassium 03/08/2018 4.3  3.5 - 5.1 mmol/L Final    Chloride 03/08/2018 97  97 - 108 mmol/L Final    CO2 03/08/2018 28  21 - 32 mmol/L Final    Anion gap 03/08/2018 8  5 - 15 mmol/L Final    Glucose 03/08/2018 380* 65 - 100 mg/dL Final    BUN 03/08/2018 13  6 - 20 MG/DL Final    Creatinine 03/08/2018 0.84  0.55 - 1.02 MG/DL Final    BUN/Creatinine ratio 03/08/2018 15  12 - 20   Final    GFR est AA 03/08/2018 >60  >60 ml/min/1.73m2 Final    GFR est non-AA 03/08/2018 >60  >60 ml/min/1.73m2 Final    Calcium 03/08/2018 9.2  8.5 - 10.1 MG/DL Final    Protein, total 03/08/2018 6.8  6.4 - 8.2 g/dL Final    Albumin 03/08/2018 3.0* 3.5 - 5.0 g/dL Final    Globulin 03/08/2018 3.8  2.0 - 4.0 g/dL Final    A-G Ratio 03/08/2018 0.8* 1.1 - 2.2   Final    Bilirubin, total 03/08/2018 0.3  0.2 - 1.0 MG/DL Final    Bilirubin, direct 03/08/2018 0.1  0.0 - 0.2 MG/DL Final    Alk. phosphatase 03/08/2018 102  45 - 117 U/L Final    AST (SGOT) 03/08/2018 10* 15 - 37 U/L Final    ALT (SGPT) 03/08/2018 26  12 - 78 U/L Final   Admission on 02/28/2018, Discharged on 03/05/2018   No results displayed because visit has over 200 results. Office Visit on 01/09/2018   Component Date Value Ref Range Status    Atopobium vaginae 01/09/2018 Moderate - 1  Score Final    BVAB 2 01/09/2018 Moderate - 1  Score Final    Megasphaera 1 01/09/2018 High - 2* Score Final    C. trachomatis by ANITA 01/09/2018 Negative  Negative Final    N. gonorrhoeae by ANITA 01/09/2018 Negative  Negative Final    T. vaginalis by ANITA 01/09/2018 Negative  Negative Final       Review of Systems   Constitutional: Negative for malaise/fatigue. HENT: Negative for congestion. Eyes: Negative for blurred vision and double vision. Respiratory: Negative for cough and shortness of breath. Cardiovascular: Negative for chest pain and leg swelling. Genitourinary: Negative for frequency and urgency. Musculoskeletal: Negative for joint pain and myalgias. Neurological: Negative for weakness. Psychiatric/Behavioral: Negative for suicidal ideas. The patient is nervous/anxious. Visit Vitals    /72 (BP 1 Location: Left arm, BP Patient Position: Sitting)    Pulse 88    Temp 98.3 °F (36.8 °C) (Oral)    Resp 16    Ht 5' 4.5\" (1.638 m)    Wt 137 lb (62.1 kg)    LMP 02/21/2018    SpO2 96%    BMI 23.15 kg/m2       Physical Exam   Constitutional: She is oriented to person, place, and time. She appears well-developed and well-nourished. No distress. HENT:   Right Ear: External ear normal.   Left Ear: External ear normal.   Eyes: Conjunctivae and EOM are normal. Right eye exhibits no discharge. Left eye exhibits no discharge. Neck: Normal range of motion. Neck supple. Cardiovascular: Normal rate and regular rhythm. Pulmonary/Chest: Effort normal and breath sounds normal.   Abdominal: Soft. Bowel sounds are normal.   Neurological: She is alert and oriented to person, place, and time. Psychiatric:   Mood normal  Affect Flat   Nursing note and vitals reviewed. ASSESSMENT and PLAN    ICD-10-CM ICD-9-CM    1. Insulin dependent diabetes mellitus type IA ([facility] E10.9 250.01 REFERRAL TO ENDOCRINOLOGY    She is noncompliant with diet. I told her that since she still has an infection and is on abx, if she does not get her BS under control she can get sick again. 2. Sepsis due to Escherichia coli [facility]) A41.51 038.42 She is getting Rocephin through PICC line and will finish course on 03/16.     995.91    Encouraged her to follow diabetic diet along with insulin  & keep regular appointments with new PCP. This plan was reviewed with the patient and patient agrees. All questions were answered.     This scribe documentation was reviewed by me and accurately reflects the examination and decisions made by me. This note will not be viewable in [address]

## 2018-03-09 NOTE — PROGRESS NOTES
Outpatient Infusion Center Progress Note    1000 Pt admit to [facility] for daily Rocephin. Pt ambulatory in stable condition. Assessment completed. No new concerns voiced. PICC line flushes with positive blood return. Visit Vitals    /73 (BP 1 Location: Left arm, BP Patient Position: Sitting)    Pulse 96    Temp 98.6 °F (37 °C)    Resp 18    LMP 02/21/2018       Medications:  Rocephin    10535 Pt tolerated treatment well. PICC line flushed, heparinized, and capped for tomorrow's infusion. D/c home ambulatory in no distress. Pt aware of next appointment scheduled for 03/09/18 at 03/10/18 at 0800.

## 2018-03-09 NOTE — MR AVS SNAPSHOT
[address] [facility] [address] 
[phone] Patient: Brooks Parkinson MRN: [id number] DPQ:[id number] Visit Information Date & Time Provider Department Dept. Phone Encounter #  
 3/9/2018 12:00 PM [facility], [address] [facility],[address] Internal Medicine [phone] [id number] Your Appointments 4/27/2018 11:00 AM  
New Patient with MD [address] [facility]) Appt Note: New pt est pcp [address], [address] [facility] [address]  
[address], [address] [facility] [address]  
  
    
 5/15/2018  2:50 PM  
New Patient with Mandi Costa MD  
[facility]) Appt Note: new pt self ref for diabetic ketoacidosis from d/c from [facility] on 3/5/18, pt will f/up with her PCP  [facility] provider Dr Ahmet Parry,  on 3/9/18, pt did not want to to to MMR location to get in sooner, would like to be placed on cancellation list, labs and notes are in connect care [address] [place] [address]  
[address] [facility] [address] [address] Upcoming Health Maintenance Date Due MICROALBUMIN Q1 2/22/2018 EYE EXAM RETINAL OR DILATED Q1 2/26/2018 FOOT EXAM Q1 7/26/2018 HEMOGLOBIN A1C Q6M 9/3/2018 LIPID PANEL Q1 11/21/2018 PAP AKA CERVICAL CYTOLOGY 11/21/2020 DTaP/Tdap/Td series (2 - Td) 10/13/2025 Allergies as of 3/9/2018  Review Complete On: 3/9/2018 By: Ibrahima Costello LPN No Known Allergies Current Immunizations  Reviewed on 3/9/2018 No immunizations on file. Reviewed by Zackery Rea RN on 3/9/2018 at 10:03 AM  
You Were Diagnosed With   
  
 Codes Comments Insulin dependent diabetes mellitus type IA ([facility]    -  Primary ICD-10-CM: E10.9 ICD-9-CM: 250.01  Sepsis due to Escherichia coli [facility])     ICD-10-CM: A41.51 
 ICD-9-CM: 038.42, 995.91 Vitals BP Pulse Temp Resp Height(growth percentile) Weight(growth percentile) 106/72 (BP 1 Location: Left arm, BP Patient Position: Sitting) 88 98.3 °F (36.8 °C) (Oral) 16 5' 4.5\" (1.638 m) 137 lb (62.1 kg) LMP SpO2 BMI OB Status Smoking Status 02/21/2018 96% 23.15 kg/m2 Having regular periods Former Smoker Vitals History BMI and BSA Data Body Mass Index Body Surface Area  
 23.15 kg/m 2 1.68 m 2 Preferred Pharmacy Pharmacy Name Phone [facility] [address] [facility], [address] [facility] [phone] Your Updated Medication List  
  
   
This list is accurate as of 3/9/18  4:50 PM.  Always use your most recent med list.  
  
  
  
  
 acetaminophen 500 mg tablet Commonly known as:  TYLENOL Take 1,000 mg by mouth every six (6) hours as needed for Pain or Fever. aspirin 81 mg tablet Take 81 mg by mouth. insulin glargine 100 unit/mL (3 mL) Inpn Commonly known as:  LANTUS,BASAGLAR  
32 units daily  
  
 insulin lispro 100 unit/mL injection Commonly known as:  HUMALOG  
4 units TIDAC plus SSI  INITIATE CORRECTIVE INSULIN PROTOCOL (MAEGAN): RX MAEGAN Normal Sensitivity (Average weight)  AC (before meals), Q6H, and Q4H CORRECTIONAL SCALE only For Blood Sugar (mg/dl) of :            140-199=2 units           200-249=3 units 250-299=5 units 300-349=7 units 350 or g  
  
 naproxen 250 mg tablet Commonly known as:  NAPROSYN Take 500 mg by mouth two (2) times daily as needed for Pain or Fever. simvastatin 20 mg tablet Commonly known as:  ZOCOR Take 10 mg by mouth nightly. TAMIFLU 75 mg capsule Generic drug:  oseltamivir Take 75 mg by mouth two (2) times a day. VICKS NyQuil Cold/Flu Liquicap 6.25- mg Cap Generic drug:  doxylamine-dm-acetaminophen Take 1 Cap by mouth nightly. VITAMIN D2 PO Take 1,000 Units by mouth. We Performed the Following REFERRAL TO ENDOCRINOLOGY [IWU16 Custom] To-Do List   
 03/10/2018  8:00 AM  
  Appointment with [facility] ([phone])  
  
 03/11/2018  8:00 AM  
  Appointment with [facility] ([phone])  
  
 03/12/2018  3:00 PM  
  Appointment with [facility] ([phone])  
  
 03/13/2018  3:00 PM  
  Appointment with [facility] ([phone])  
  
 03/14/2018  2:00 PM  
  Appointment with [facility] ([phone])  
  
 03/15/2018 11:00 AM  
  Appointment with [facility] ([phone])  
  
 03/16/2018 2:00 PM  
  Appointment with [facility] ([phone]) Referral Information Referral ID Referred By Referred To  
  
 [id number] Cayla CEJA MD   
   [place] [address] [address] [facility], [address] Phone: [phone] Fax: [phone] Visits Status Start Date End Date 1 New Request 3/9/18 3/9/19 If your referral has a status of pending review or denied, additional information will be sent to support the outcome of this decision. Introducing [facility] SERVICES! Dear Day: 
Thank you for requesting a [employer] account. Our records indicate that you already have an active [employer] account. You can access your account anytime at [email or web address] Did you know that you can access your hospital and ER discharge instructions at any time in [employer]? You can also review all of your test results from your hospital stay or ER visit. Additional Information If you have questions, please visit the Frequently Asked Questions section of the [employer] website at [email or web address] Remember, [employer] is NOT to be used for urgent needs. For medical emergencies, dial 911. Now available from your iPhone and Android! Please provide this summary of care documentation to your next provider. Your primary care clinician is listed as Sean Arita. If you have any questions after today's visit, please call [phone].

## 2018-03-10 ENCOUNTER — HOSPITAL ENCOUNTER (OUTPATIENT)
Dept: INFUSION THERAPY | Age: 28
Discharge: HOME | End: 2018-03-10
Payer: COMMERCIAL

## 2018-03-10 VITALS
OXYGEN SATURATION: 100 % | SYSTOLIC BLOOD PRESSURE: 107 MMHG | TEMPERATURE: 97.8 F | DIASTOLIC BLOOD PRESSURE: 67 MMHG | HEART RATE: 85 BPM | RESPIRATION RATE: 18 BRPM

## 2018-03-10 PROCEDURE — 74011250636 HC RX REV CODE- 250/636

## 2018-03-10 PROCEDURE — 74011000258 HC RX REV CODE- 258: Performed by: INTERNAL MEDICINE

## 2018-03-10 PROCEDURE — 74011250636 HC RX REV CODE- 250/636: Performed by: INTERNAL MEDICINE

## 2018-03-10 PROCEDURE — 96365 THER/PROPH/DIAG IV INF INIT: CPT

## 2018-03-10 RX ORDER — HEPARIN 100 UNIT/ML
500 SYRINGE INTRAVENOUS AS NEEDED
Status: ACTIVE | OUTPATIENT
Start: 2018-03-10 | End: 2018-03-11

## 2018-03-10 RX ORDER — SODIUM CHLORIDE 0.9 % (FLUSH) 0.9 %
5-10 SYRINGE (ML) INJECTION AS NEEDED
Status: ACTIVE | OUTPATIENT
Start: 2018-03-10 | End: 2018-03-11

## 2018-03-10 RX ADMIN — SODIUM CHLORIDE, PRESERVATIVE FREE 500 UNITS: 5 INJECTION INTRAVENOUS at 09:19

## 2018-03-10 RX ADMIN — SODIUM CHLORIDE 1 G: 900 INJECTION, SOLUTION INTRAVENOUS at 08:15

## 2018-03-10 RX ADMIN — Medication 10 ML: at 09:19

## 2018-03-10 NOTE — CONSULTS
[address]    Ernie Hinson  MR#: [id number]  : 1990  ACCOUNT #: [de-identified]   DATE OF SERVICE: 2018    REASON FOR CONSULTATION:  Bacteremia. HISTORY OF PRESENT ILLNESS:  The patient is a 70-year-old woman whose medical history is significant for insulin-dependent diabetes. She is coming to the hospital because she has been having some right flank pain and has been vomiting since 2 a.m. in the morning. Her symptoms are associated with fever, chills and dysuria. Because of her symptoms, she went to [facility] where she was diagnosed to have ketoacidosis. She had a CT scan done and it showed that there was bilateral focal interstitial nephritis without hydronephrosis or organized abscess. Her blood cultures grew out 3/4 E. coli. Surprisingly, her urine culture did not grow any significant organisms. We are now being asked to see her in consult. She has no diarrhea, no rash, no headache, no sore throat. ALLERGIES:  NO KNOWN DRUG ALLERGIES. CURRENT  MEDICATIONS:  1. Docusate sodium. 2.  Lantus. 3.  Zosyn. 4.  Simvastatin. 5.  Tylenol. 6.  Morphine  7. Zofran. REVIEW OF SYSTEMS:  Aside from the things mentioned in the history, rest of review of systems is negative. PAST MEDICAL HISTORY:  Diabetes. PAST SURGICAL HISTORY:  D and C to medically terminate a pregnancy. SOCIAL HISTORY:  She is a former smoker. She does not engage IV drug abuse. She drinks two to three beers daily. FAMILY HISTORY:  Her mom and dad are healthy. PHYSICAL EXAMINATION:  GENERAL:  She is not in respiratory distress. VITAL SIGNS:  Blood pressure 122/73, pulse 106, temperature 99.2. HEENT:  Pink conjunctivae, anicteric sclerae. No JVD. No cervical lymphadenopathy. External ears are normal.  LUNGS:  Clear to auscultation. No rales, wheezes or rhonchi. CARDIOVASCULAR:  No murmurs, rubs or clicks. ABDOMEN:  Soft, nontender.   No guarding or rebound. GENITOURINARY:  No CVA tenderness. MUSCULOSKELETAL:  Knees are not warm and not tender. PSYCHIATRIC:  No disturbance of thought. Mood and affect are appropriate. NEUROLOGIC:  She has full use of her extraocular muscles. Tongue midline. No facial asymmetry. Muscle strength is 5/5. LABORATORY DATA:  White blood cell count is 6.4 hemoglobin 12.7, platelet 345. AST 18, ALT 23, alkaline phosphatase 205, total bilirubin 0.5. Hemoglobin A1c is 10.8. Ultrasound of the gallbladder is normal.  Chest x-ray shows no acute process. ASSESSMENT:  1.  Gram-negative lorrie bacteremia, likely nephrologic in origin. 2.  Bilateral hypodense kidney lesion. 3.  Type 1 diabetes with recent ketoacidosis. PLAN:  1.  I would continue Zosyn as she has responded nicely. 2.  We should follow up repeat blood cultures. 3.  If the organism is sensitive to Levaquin, she can eventually be placed on oral therapy. 4.  I think she will eventually need a repeat CT to document resolution of the renal lesions. Dr. Jona Hay will check cultures over the weekend. Please call him with questions.       MD MARIA C Duke / [place]  D: 03/09/2018 19:29     T: 03/09/2018 20:10  JOB #: [id number]

## 2018-03-10 NOTE — PROGRESS NOTES
[facility] Outpatient Infusion Center Note:  Pt arrived at [facility] ambulatory and in no distress for ***. Assessment ***, no new complaints voiced. Medications received:  Rocephin    *** Tolerated treatment well, no adverse reaction noted. D/Cd from [facility] ambulatory and in no distress accompanied by ***. Next appt ***  Visit Vitals    /67    Pulse 85    Temp 97.8 °F (36.6 °C)    Resp 18    LMP 02/21/2018    SpO2 100%     No results found for this or any previous visit (from the past 12 hour(s)).

## 2018-03-11 ENCOUNTER — APPOINTMENT (OUTPATIENT)
Dept: INFUSION THERAPY | Age: 28
End: 2018-03-11
Payer: COMMERCIAL

## 2018-03-11 ENCOUNTER — HOSPITAL ENCOUNTER (OUTPATIENT)
Dept: INFUSION THERAPY | Age: 28
Discharge: HOME | End: 2018-03-11
Payer: COMMERCIAL

## 2018-03-11 VITALS
DIASTOLIC BLOOD PRESSURE: 72 MMHG | RESPIRATION RATE: 16 BRPM | TEMPERATURE: 97.5 F | HEART RATE: 98 BPM | SYSTOLIC BLOOD PRESSURE: 111 MMHG

## 2018-03-11 PROCEDURE — 74011250636 HC RX REV CODE- 250/636: Performed by: INTERNAL MEDICINE

## 2018-03-11 PROCEDURE — 96365 THER/PROPH/DIAG IV INF INIT: CPT

## 2018-03-11 PROCEDURE — 74011000258 HC RX REV CODE- 258: Performed by: INTERNAL MEDICINE

## 2018-03-11 RX ORDER — HEPARIN 100 UNIT/ML
500 SYRINGE INTRAVENOUS AS NEEDED
Status: ACTIVE | OUTPATIENT
Start: 2018-03-11 | End: 2018-03-12

## 2018-03-11 RX ORDER — SODIUM CHLORIDE 0.9 % (FLUSH) 0.9 %
5-10 SYRINGE (ML) INJECTION AS NEEDED
Status: DISCONTINUED | OUTPATIENT
Start: 2018-03-11 | End: 2018-03-15 | Stop reason: HOSPADM

## 2018-03-11 RX ADMIN — Medication 10 ML: at 09:30

## 2018-03-11 RX ADMIN — SODIUM CHLORIDE, PRESERVATIVE FREE 500 UNITS: 5 INJECTION INTRAVENOUS at 09:30

## 2018-03-11 RX ADMIN — SODIUM CHLORIDE 1 G: 900 INJECTION, SOLUTION INTRAVENOUS at 08:50

## 2018-03-11 RX ADMIN — Medication 10 ML: at 08:56

## 2018-03-11 NOTE — PROGRESS NOTES
[facility] Outpatient Infusion Center Note:  4361TP arrived at [facility] ambulatory and in no distress for **daily antibiotic  Assessment stable, no new complaints voiced. Medications received:  *Rocephin     Tolerated treatment well, no adverse reaction noted. D/Cd from [facility] ambulatory and in no distress accompanied by self. Next appt 3/12  Visit Vitals    /72    Pulse 98    Temp 97.5 °F (36.4 °C)    Resp 16    LMP 02/21/2018     No results found for this or any previous visit (from the past 12 hour(s)).

## 2018-03-12 ENCOUNTER — HOSPITAL ENCOUNTER (OUTPATIENT)
Dept: INFUSION THERAPY | Age: 28
Discharge: HOME | End: 2018-03-12
Payer: COMMERCIAL

## 2018-03-12 VITALS
HEART RATE: 86 BPM | DIASTOLIC BLOOD PRESSURE: 72 MMHG | SYSTOLIC BLOOD PRESSURE: 108 MMHG | TEMPERATURE: 97.4 F | OXYGEN SATURATION: 100 % | RESPIRATION RATE: 18 BRPM

## 2018-03-12 LAB
ALBUMIN SERPL-MCNC: 3.4 G/DL (ref 3.5–5)
ALBUMIN/GLOB SERPL: 0.9 {RATIO} (ref 1.1–2.2)
ALP SERPL-CCNC: 94 U/L (ref 45–117)
ALT SERPL-CCNC: 24 U/L (ref 12–78)
ANION GAP SERPL CALC-SCNC: 8 MMOL/L (ref 5–15)
AST SERPL-CCNC: 19 U/L (ref 15–37)
BASOPHILS # BLD: 0.1 K/UL (ref 0–0.1)
BASOPHILS NFR BLD: 1 % (ref 0–1)
BILIRUB DIRECT SERPL-MCNC: 0.1 MG/DL (ref 0–0.2)
BILIRUB SERPL-MCNC: 0.3 MG/DL (ref 0.2–1)
BUN SERPL-MCNC: 13 MG/DL (ref 6–20)
BUN/CREAT SERPL: 22 (ref 12–20)
CALCIUM SERPL-MCNC: 8.8 MG/DL (ref 8.5–10.1)
CHLORIDE SERPL-SCNC: 98 MMOL/L (ref 97–108)
CO2 SERPL-SCNC: 28 MMOL/L (ref 21–32)
CREAT SERPL-MCNC: 0.58 MG/DL (ref 0.55–1.02)
DIFFERENTIAL METHOD BLD: ABNORMAL
EOSINOPHIL # BLD: 0.3 K/UL (ref 0–0.4)
EOSINOPHIL NFR BLD: 3 % (ref 0–7)
ERYTHROCYTE [DISTWIDTH] IN BLOOD BY AUTOMATED COUNT: 12 % (ref 11.5–14.5)
GLOBULIN SER CALC-MCNC: 3.8 G/DL (ref 2–4)
GLUCOSE SERPL-MCNC: 241 MG/DL (ref 65–100)
HCT VFR BLD AUTO: 39.2 % (ref 35–47)
HGB BLD-MCNC: 13.1 G/DL (ref 11.5–16)
IMM GRANULOCYTES # BLD: 0.1 K/UL (ref 0–0.04)
IMM GRANULOCYTES NFR BLD AUTO: 1 % (ref 0–0.5)
LYMPHOCYTES # BLD: 2 K/UL (ref 0.8–3.5)
LYMPHOCYTES NFR BLD: 23 % (ref 12–49)
MCH RBC QN AUTO: 32 PG (ref 26–34)
MCHC RBC AUTO-ENTMCNC: 33.4 G/DL (ref 30–36.5)
MCV RBC AUTO: 95.8 FL (ref 80–99)
MONOCYTES # BLD: 0.8 K/UL (ref 0–1)
MONOCYTES NFR BLD: 9 % (ref 5–13)
NEUTS SEG # BLD: 5.5 K/UL (ref 1.8–8)
NEUTS SEG NFR BLD: 63 % (ref 32–75)
NRBC # BLD: 0 K/UL (ref 0–0.01)
NRBC BLD-RTO: 0 PER 100 WBC
PLATELET # BLD AUTO: 518 K/UL (ref 150–400)
PMV BLD AUTO: 8.9 FL (ref 8.9–12.9)
POTASSIUM SERPL-SCNC: 3.7 MMOL/L (ref 3.5–5.1)
PROT SERPL-MCNC: 7.2 G/DL (ref 6.4–8.2)
RBC # BLD AUTO: 4.09 M/UL (ref 3.8–5.2)
SODIUM SERPL-SCNC: 134 MMOL/L (ref 136–145)
WBC # BLD AUTO: 8.8 K/UL (ref 3.6–11)

## 2018-03-12 PROCEDURE — 80076 HEPATIC FUNCTION PANEL: CPT | Performed by: INTERNAL MEDICINE

## 2018-03-12 PROCEDURE — 77030020847 HC STATLOK BARD -A

## 2018-03-12 PROCEDURE — 36415 COLL VENOUS BLD VENIPUNCTURE: CPT | Performed by: INTERNAL MEDICINE

## 2018-03-12 PROCEDURE — 74011000258 HC RX REV CODE- 258: Performed by: INTERNAL MEDICINE

## 2018-03-12 PROCEDURE — 96365 THER/PROPH/DIAG IV INF INIT: CPT

## 2018-03-12 PROCEDURE — 85025 COMPLETE CBC W/AUTO DIFF WBC: CPT | Performed by: INTERNAL MEDICINE

## 2018-03-12 PROCEDURE — 74011250636 HC RX REV CODE- 250/636: Performed by: INTERNAL MEDICINE

## 2018-03-12 PROCEDURE — 80048 BASIC METABOLIC PNL TOTAL CA: CPT | Performed by: INTERNAL MEDICINE

## 2018-03-12 RX ORDER — HEPARIN 100 UNIT/ML
500 SYRINGE INTRAVENOUS AS NEEDED
Status: ACTIVE | OUTPATIENT
Start: 2018-03-12 | End: 2018-03-13

## 2018-03-12 RX ORDER — SODIUM CHLORIDE 0.9 % (FLUSH) 0.9 %
10-40 SYRINGE (ML) INJECTION AS NEEDED
Status: ACTIVE | OUTPATIENT
Start: 2018-03-12 | End: 2018-03-13

## 2018-03-12 RX ADMIN — Medication 10 ML: at 15:35

## 2018-03-12 RX ADMIN — SODIUM CHLORIDE, PRESERVATIVE FREE 500 UNITS: 5 INJECTION INTRAVENOUS at 16:09

## 2018-03-12 RX ADMIN — SODIUM CHLORIDE 1 G: 900 INJECTION INTRAVENOUS at 15:38

## 2018-03-12 RX ADMIN — Medication 20 ML: at 16:09

## 2018-03-12 NOTE — PROGRESS NOTES
1505 Pt admit to [facility] for Daily Rocephin ambulatory in stable condition. Assessment completed. No new concerns voiced. Single Lumen PICC with positive blood return. Labs drawn per order and sent. Dressing on PICC changed per policy, including Stat-Cameron and end clave of line post blood draw. Line flushed, medication administered. NOT all labs resulted at time of note - please follow in Connect [facility]. Visit Vitals    /72 (BP 1 Location: Left arm, BP Patient Position: Sitting)    Pulse 86    Temp 97.4 °F (36.3 °C)    Resp 18    LMP 02/21/2018    SpO2 100%       Medications:  Normal Saline Flush  Rocephin  Heparin Flush    1610 Pt tolerated treatment well. PICC maintained positive blood return throughout treatment, flushed with positive blood return at conclusion and heparinized per protocol. Curos cap placed on end clave. D/c home ambulatory in no distress. Pt aware of next [facility] appointment scheduled for 3/13/18. Recent Results (from the past 12 hour(s))   CBC WITH AUTOMATED DIFF    Collection Time: 03/12/18  3:39 PM   Result Value Ref Range    WBC 8.8 3.6 - 11.0 K/uL    RBC 4.09 3.80 - 5.20 M/uL    HGB 13.1 11.5 - 16.0 g/dL    HCT 39.2 35.0 - 47.0 %    MCV 95.8 80.0 - 99.0 FL    MCH 32.0 26.0 - 34.0 PG    MCHC 33.4 30.0 - 36.5 g/dL    RDW 12.0 11.5 - 14.5 %    PLATELET 283 (H) 228 - 400 K/uL    MPV 8.9 8.9 - 12.9 FL    NRBC 0.0 0  WBC    ABSOLUTE NRBC 0.00 0.00 - 0.01 K/uL    NEUTROPHILS 63 32 - 75 %    LYMPHOCYTES 23 12 - 49 %    MONOCYTES 9 5 - 13 %    EOSINOPHILS 3 0 - 7 %    BASOPHILS 1 0 - 1 %    IMMATURE GRANULOCYTES 1 (H) 0.0 - 0.5 %    ABS. NEUTROPHILS 5.5 1.8 - 8.0 K/UL    ABS. LYMPHOCYTES 2.0 0.8 - 3.5 K/UL    ABS. MONOCYTES 0.8 0.0 - 1.0 K/UL    ABS. EOSINOPHILS 0.3 0.0 - 0.4 K/UL    ABS. BASOPHILS 0.1 0.0 - 0.1 K/UL    ABS. IMM.  GRANS. 0.1 (H) 0.00 - 0.04 K/UL    DF AUTOMATED

## 2018-03-13 ENCOUNTER — HOSPITAL ENCOUNTER (OUTPATIENT)
Dept: INFUSION THERAPY | Age: 28
Discharge: HOME | End: 2018-03-13
Payer: COMMERCIAL

## 2018-03-13 VITALS — SYSTOLIC BLOOD PRESSURE: 116 MMHG | RESPIRATION RATE: 18 BRPM | DIASTOLIC BLOOD PRESSURE: 73 MMHG | HEART RATE: 84 BPM

## 2018-03-13 PROCEDURE — 74011250636 HC RX REV CODE- 250/636: Performed by: INTERNAL MEDICINE

## 2018-03-13 PROCEDURE — 74011000258 HC RX REV CODE- 258: Performed by: INTERNAL MEDICINE

## 2018-03-13 PROCEDURE — 96365 THER/PROPH/DIAG IV INF INIT: CPT

## 2018-03-13 RX ORDER — HEPARIN 100 UNIT/ML
500 SYRINGE INTRAVENOUS AS NEEDED
Status: ACTIVE | OUTPATIENT
Start: 2018-03-13 | End: 2018-03-14

## 2018-03-13 RX ORDER — SODIUM CHLORIDE 0.9 % (FLUSH) 0.9 %
5-10 SYRINGE (ML) INJECTION AS NEEDED
Status: ACTIVE | OUTPATIENT
Start: 2018-03-13 | End: 2018-03-14

## 2018-03-13 RX ADMIN — Medication 10 ML: at 16:17

## 2018-03-13 RX ADMIN — Medication 10 ML: at 16:15

## 2018-03-13 RX ADMIN — CEFTRIAXONE 1 G: 1 INJECTION, POWDER, FOR SOLUTION INTRAMUSCULAR; INTRAVENOUS at 15:39

## 2018-03-13 RX ADMIN — SODIUM CHLORIDE, PRESERVATIVE FREE 500 UNITS: 5 INJECTION INTRAVENOUS at 16:18

## 2018-03-13 NOTE — PROGRESS NOTES
Outpatient Infusion Center Progress Note    1843 Pt admit to [facility] for Rocephin ambulatory in stable condition. Assessment completed. No new concerns voiced. Single lumen PICC flushed with positive blood return. Visit Vitals    /73    Pulse 84    Resp 18    LMP 02/21/2018       Medications:  Rocephin IV  NS flush  Heparin flush    1620 Pt tolerated treatment well. PICC flushed with NS, heparinized, and capped. D/c home ambulatory in no distress. Pt aware of next appointment scheduled for 3/14/18.

## 2018-03-14 ENCOUNTER — HOSPITAL ENCOUNTER (OUTPATIENT)
Dept: INFUSION THERAPY | Age: 28
Discharge: HOME | End: 2018-03-14
Payer: COMMERCIAL

## 2018-03-14 VITALS
HEART RATE: 84 BPM | DIASTOLIC BLOOD PRESSURE: 64 MMHG | SYSTOLIC BLOOD PRESSURE: 113 MMHG | TEMPERATURE: 97.6 F | RESPIRATION RATE: 16 BRPM

## 2018-03-14 PROCEDURE — 74011000258 HC RX REV CODE- 258: Performed by: INTERNAL MEDICINE

## 2018-03-14 PROCEDURE — 96365 THER/PROPH/DIAG IV INF INIT: CPT

## 2018-03-14 PROCEDURE — 74011250636 HC RX REV CODE- 250/636: Performed by: INTERNAL MEDICINE

## 2018-03-14 RX ORDER — HEPARIN 100 UNIT/ML
500 SYRINGE INTRAVENOUS AS NEEDED
Status: ACTIVE | OUTPATIENT
Start: 2018-03-14 | End: 2018-03-15

## 2018-03-14 RX ORDER — SODIUM CHLORIDE 0.9 % (FLUSH) 0.9 %
10-40 SYRINGE (ML) INJECTION AS NEEDED
Status: ACTIVE | OUTPATIENT
Start: 2018-03-14 | End: 2018-03-15

## 2018-03-14 RX ADMIN — Medication 10 ML: at 14:10

## 2018-03-14 RX ADMIN — SODIUM CHLORIDE, PRESERVATIVE FREE 500 UNITS: 5 INJECTION INTRAVENOUS at 15:30

## 2018-03-14 RX ADMIN — SODIUM CHLORIDE 1 G: 900 INJECTION, SOLUTION INTRAVENOUS at 14:49

## 2018-03-14 RX ADMIN — Medication 10 ML: at 15:30

## 2018-03-14 NOTE — PROGRESS NOTES
1410 Pt admit to [facility] for Rocephin ambulatory in stable condition. Assessment completed. No new concerns voiced. PICC with positive blood return. Normal Saline started at [facility]    Visit Vitals    /64    Pulse 84    Temp 97.6 °F (36.4 °C)    Resp 16    LMP 02/21/2018       Medications:  Normal Saline KVO  Rocephin  Heparin Flush    1530 Pt tolerated treatment well. PICC maintained positive blood return throughout treatment, flushed with positive blood return at conclusion and PICC heparinized. D/c home ambulatory in no distress. Pt aware of next [facility] appointment scheduled for 3/15/18.

## 2018-03-15 ENCOUNTER — HOSPITAL ENCOUNTER (OUTPATIENT)
Dept: CT IMAGING | Age: 28
Discharge: HOME | End: 2018-03-15
Attending: INTERNAL MEDICINE
Payer: COMMERCIAL

## 2018-03-15 ENCOUNTER — HOSPITAL ENCOUNTER (OUTPATIENT)
Dept: INFUSION THERAPY | Age: 28
Discharge: HOME | End: 2018-03-15
Payer: COMMERCIAL

## 2018-03-15 VITALS
RESPIRATION RATE: 20 BRPM | HEART RATE: 98 BPM | TEMPERATURE: 98.6 F | SYSTOLIC BLOOD PRESSURE: 120 MMHG | OXYGEN SATURATION: 99 % | DIASTOLIC BLOOD PRESSURE: 70 MMHG

## 2018-03-15 DIAGNOSIS — N39.0 ESCHERICHIA COLI URINARY TRACT INFECTION: ICD-10-CM

## 2018-03-15 DIAGNOSIS — B96.20 ESCHERICHIA COLI URINARY TRACT INFECTION: ICD-10-CM

## 2018-03-15 LAB
ALBUMIN SERPL-MCNC: 3.4 G/DL (ref 3.5–5)
ALBUMIN/GLOB SERPL: 1.1 {RATIO} (ref 1.1–2.2)
ALP SERPL-CCNC: 83 U/L (ref 45–117)
ALT SERPL-CCNC: 20 U/L (ref 12–78)
ANION GAP SERPL CALC-SCNC: 6 MMOL/L (ref 5–15)
AST SERPL-CCNC: 16 U/L (ref 15–37)
BASOPHILS # BLD: 0.1 K/UL (ref 0–0.1)
BASOPHILS NFR BLD: 1 % (ref 0–1)
BILIRUB DIRECT SERPL-MCNC: 0.1 MG/DL (ref 0–0.2)
BILIRUB SERPL-MCNC: 0.4 MG/DL (ref 0.2–1)
BUN SERPL-MCNC: 9 MG/DL (ref 6–20)
BUN/CREAT SERPL: 18 (ref 12–20)
CALCIUM SERPL-MCNC: 8.8 MG/DL (ref 8.5–10.1)
CHLORIDE SERPL-SCNC: 100 MMOL/L (ref 97–108)
CO2 SERPL-SCNC: 29 MMOL/L (ref 21–32)
CREAT SERPL-MCNC: 0.5 MG/DL (ref 0.55–1.02)
DIFFERENTIAL METHOD BLD: ABNORMAL
EOSINOPHIL # BLD: 0.2 K/UL (ref 0–0.4)
EOSINOPHIL NFR BLD: 4 % (ref 0–7)
ERYTHROCYTE [DISTWIDTH] IN BLOOD BY AUTOMATED COUNT: 12.1 % (ref 11.5–14.5)
GLOBULIN SER CALC-MCNC: 3.1 G/DL (ref 2–4)
GLUCOSE SERPL-MCNC: 202 MG/DL (ref 65–100)
HCT VFR BLD AUTO: 39.1 % (ref 35–47)
HGB BLD-MCNC: 13.1 G/DL (ref 11.5–16)
IMM GRANULOCYTES # BLD: 0 K/UL (ref 0–0.04)
IMM GRANULOCYTES NFR BLD AUTO: 0 % (ref 0–0.5)
LYMPHOCYTES # BLD: 1.9 K/UL (ref 0.8–3.5)
LYMPHOCYTES NFR BLD: 30 % (ref 12–49)
MCH RBC QN AUTO: 32.2 PG (ref 26–34)
MCHC RBC AUTO-ENTMCNC: 33.5 G/DL (ref 30–36.5)
MCV RBC AUTO: 96.1 FL (ref 80–99)
MONOCYTES # BLD: 0.7 K/UL (ref 0–1)
MONOCYTES NFR BLD: 10 % (ref 5–13)
NEUTS SEG # BLD: 3.5 K/UL (ref 1.8–8)
NEUTS SEG NFR BLD: 55 % (ref 32–75)
NRBC # BLD: 0 K/UL (ref 0–0.01)
NRBC BLD-RTO: 0 PER 100 WBC
PLATELET # BLD AUTO: 432 K/UL (ref 150–400)
PMV BLD AUTO: 9.3 FL (ref 8.9–12.9)
POTASSIUM SERPL-SCNC: 4.4 MMOL/L (ref 3.5–5.1)
PROT SERPL-MCNC: 6.5 G/DL (ref 6.4–8.2)
RBC # BLD AUTO: 4.07 M/UL (ref 3.8–5.2)
SODIUM SERPL-SCNC: 135 MMOL/L (ref 136–145)
WBC # BLD AUTO: 6.3 K/UL (ref 3.6–11)

## 2018-03-15 PROCEDURE — 80048 BASIC METABOLIC PNL TOTAL CA: CPT | Performed by: INTERNAL MEDICINE

## 2018-03-15 PROCEDURE — 74170 CT ABD WO CNTRST FLWD CNTRST: CPT

## 2018-03-15 PROCEDURE — 85025 COMPLETE CBC W/AUTO DIFF WBC: CPT | Performed by: INTERNAL MEDICINE

## 2018-03-15 PROCEDURE — 74011000258 HC RX REV CODE- 258: Performed by: INTERNAL MEDICINE

## 2018-03-15 PROCEDURE — 36415 COLL VENOUS BLD VENIPUNCTURE: CPT | Performed by: INTERNAL MEDICINE

## 2018-03-15 PROCEDURE — 74011636320 HC RX REV CODE- 636/320: Performed by: INTERNAL MEDICINE

## 2018-03-15 PROCEDURE — 80076 HEPATIC FUNCTION PANEL: CPT | Performed by: INTERNAL MEDICINE

## 2018-03-15 PROCEDURE — 96365 THER/PROPH/DIAG IV INF INIT: CPT

## 2018-03-15 PROCEDURE — 74011250636 HC RX REV CODE- 250/636: Performed by: INTERNAL MEDICINE

## 2018-03-15 RX ORDER — SODIUM CHLORIDE 0.9 % (FLUSH) 0.9 %
10-40 SYRINGE (ML) INJECTION AS NEEDED
Status: ACTIVE | OUTPATIENT
Start: 2018-03-15 | End: 2018-03-16

## 2018-03-15 RX ORDER — CEFTRIAXONE 1 G/1
INJECTION, POWDER, FOR SOLUTION INTRAMUSCULAR; INTRAVENOUS
Status: DISPENSED
Start: 2018-03-15 | End: 2018-03-15

## 2018-03-15 RX ORDER — SODIUM CHLORIDE 900 MG/100ML
INJECTION INTRAVENOUS
Status: DISPENSED
Start: 2018-03-15 | End: 2018-03-15

## 2018-03-15 RX ORDER — HEPARIN 100 UNIT/ML
500 SYRINGE INTRAVENOUS AS NEEDED
Status: ACTIVE | OUTPATIENT
Start: 2018-03-15 | End: 2018-03-16

## 2018-03-15 RX ORDER — SODIUM CHLORIDE 0.9 % (FLUSH) 0.9 %
10 SYRINGE (ML) INJECTION
Status: COMPLETED | OUTPATIENT
Start: 2018-03-15 | End: 2018-03-15

## 2018-03-15 RX ORDER — SODIUM CHLORIDE 9 MG/ML
10 INJECTION INTRAMUSCULAR; INTRAVENOUS; SUBCUTANEOUS AS NEEDED
Status: ACTIVE | OUTPATIENT
Start: 2018-03-15 | End: 2018-03-16

## 2018-03-15 RX ADMIN — Medication 10 ML: at 12:20

## 2018-03-15 RX ADMIN — SODIUM CHLORIDE, PRESERVATIVE FREE 500 UNITS: 5 INJECTION INTRAVENOUS at 12:20

## 2018-03-15 RX ADMIN — Medication 10 ML: at 11:40

## 2018-03-15 RX ADMIN — Medication 10 ML: at 08:32

## 2018-03-15 RX ADMIN — SODIUM CHLORIDE 100 ML: 900 INJECTION, SOLUTION INTRAVENOUS at 08:32

## 2018-03-15 RX ADMIN — IOPAMIDOL 100 ML: 755 INJECTION, SOLUTION INTRAVENOUS at 08:32

## 2018-03-15 RX ADMIN — SODIUM CHLORIDE 1 G: 900 INJECTION, SOLUTION INTRAVENOUS at 11:42

## 2018-03-15 NOTE — PROGRESS NOTES
[id number]  Patient arrived ambulatory and in no distress for daily antibiotic treatment. Problem focused assessment unchanged, no new concerns voiced. Rocephin via SL PICC patient tolerated this without difficulty. [VS] Blood pressure 120/70, pulse 98, temperature 98.6 °F (37 °C), resp. rate 20, last menstrual period 02/21/2018, SpO2 99 %. 1520  Pt left ambulatory in no distress, has a return appointment tomorrow.

## 2018-03-16 ENCOUNTER — HOSPITAL ENCOUNTER (OUTPATIENT)
Dept: INFUSION THERAPY | Age: 28
Discharge: HOME | End: 2018-03-16
Payer: COMMERCIAL

## 2018-03-16 PROCEDURE — 74011250636 HC RX REV CODE- 250/636: Performed by: INTERNAL MEDICINE

## 2018-03-16 PROCEDURE — 96365 THER/PROPH/DIAG IV INF INIT: CPT

## 2018-03-16 PROCEDURE — 74011000258 HC RX REV CODE- 258: Performed by: INTERNAL MEDICINE

## 2018-03-16 RX ORDER — SODIUM CHLORIDE 0.9 % (FLUSH) 0.9 %
5-10 SYRINGE (ML) INJECTION AS NEEDED
Status: ACTIVE | OUTPATIENT
Start: 2018-03-16 | End: 2018-03-17

## 2018-03-16 RX ADMIN — SODIUM CHLORIDE 1 G: 900 INJECTION, SOLUTION INTRAVENOUS at 13:59

## 2018-03-16 NOTE — PROGRESS NOTES
1400 Pt admit to [facility] for Rocephin ambulatory in stable condition. Assessment completed. No new concerns voiced. PICC with positive blood return. Normal Saline started at [facility]    LMP 02/21/2018       Medications:  Normal Saline KVO  Rocephin  Heparin Flush    1530 Pt tolerated treatment well. PICC maintained positive blood return throughout treatment, flushed with positive blood return at conclusion. PICC removed per order and opic protocol (had patient lay flat for 30 minutes post PICC removal.). D/c home ambulatory in no distress. Pt to follow up with MD for oral antibiotics.

## 2018-03-20 ENCOUNTER — OFFICE VISIT (OUTPATIENT)
Dept: INTERNAL MEDICINE CLINIC | Age: 28
End: 2018-03-20

## 2018-03-20 VITALS
TEMPERATURE: 99 F | BODY MASS INDEX: 23.53 KG/M2 | DIASTOLIC BLOOD PRESSURE: 68 MMHG | HEART RATE: 86 BPM | OXYGEN SATURATION: 98 % | SYSTOLIC BLOOD PRESSURE: 102 MMHG | WEIGHT: 141.2 LBS | RESPIRATION RATE: 16 BRPM | HEIGHT: 65 IN

## 2018-03-20 DIAGNOSIS — F60.3 BORDERLINE PERSONALITY DISORDER (HCC): ICD-10-CM

## 2018-03-20 DIAGNOSIS — E10.9 INSULIN DEPENDENT DIABETES MELLITUS TYPE IA (HCC): Primary | ICD-10-CM

## 2018-03-20 NOTE — PROGRESS NOTES
Written by Aziza Little, as dictated by Dr. Courtney Godinez MD.    Arsenio Henderson is a 24 y.o. female. HPI  The patient comes in today to get paperwork filled from us, even though she has switched to another practice. She wants to get [place] paperwork done from us, which we had provided her before when she was out of work due to her elective . She would like further paperwork covering her hospital stay until now. She has been noncompliant with medication and does not want to talk about her medical history much. She wants to discuss everything with her new PCP and endocrinologist. She has been very rude and unprofessional with the office staff, even though we had told her that if she was not happy she should not come back to the office. Patient Active Problem List   Diagnosis Code    Type I (juvenile type) diabetes mellitus without mention of complication, uncontrolled E10.65    Borderline personality disorder F60.3    Insulin dependent diabetes mellitus type IA ([facility] E10.9    Generalized anxiety disorder F41.1    DKA (diabetic ketoacidoses) ([facility]) E13.10        Current Outpatient Prescriptions on File Prior to Visit   Medication Sig Dispense Refill    insulin glargine (LANTUS,BASAGLAR) 100 unit/mL (3 mL) inpn 32 units daily 1 Pen 1    insulin lispro (HUMALOG) 100 unit/mL injection 4 units TIDAC plus SSI    INITIATE CORRECTIVE INSULIN PROTOCOL (MAEGAN):  RX MAEGAN Normal Sensitivity (Average weight)    AC (before meals), Q6H, and Q4H CORRECTIONAL SCALE only For Blood Sugar (mg/dl) of :             140-199=2 units            200-249=3 units  250-299=5 units  300-349=7 units  350 or g 1 Vial 0    simvastatin (ZOCOR) 20 mg tablet Take 10 mg by mouth nightly.  acetaminophen (TYLENOL) 500 mg tablet Take 1,000 mg by mouth every six (6) hours as needed for Pain or Fever.       naproxen (NAPROSYN) 250 mg tablet Take 500 mg by mouth two (2) times daily as needed for Pain or Fever.  ERGOCALCIFEROL, VITAMIN D2, (VITAMIN D PO) Take 1,000 Units by mouth.  aspirin 81 mg tablet Take 81 mg by mouth.  oseltamivir (TAMIFLU) 75 mg capsule Take 75 mg by mouth two (2) times a day.  doxylamine-dm-acetaminophen (VICKS NYQUIL COLD/FLU LIQUICAP) 6.25- mg cap Take 1 Cap by mouth nightly. No current facility-administered medications on file prior to visit. Past Medical History:   Diagnosis Date         Diabetes mellitus        Family History   Problem Relation Age of Onset    Cancer Paternal Grandfather        Social History     Social History    Marital status: SINGLE     Spouse name: N/A    Number of children: N/A    Years of education: N/A     Occupational History    Not on file.      Social History Main Topics    Smoking status: Former Smoker     Packs/day: 0.00    Smokeless tobacco: Never Used      Comment: quit date 17    Alcohol use No    Drug use: No    Sexual activity: Yes     Partners: Male     Other Topics Concern    Not on file     Social History Narrative       Hospital Outpatient Visit on 03/15/2018   Component Date Value Ref Range Status    WBC 03/15/2018 6.3  3.6 - 11.0 K/uL Final    RBC 03/15/2018 4.07  3.80 - 5.20 M/uL Final    HGB 03/15/2018 13.1  11.5 - 16.0 g/dL Final    HCT 03/15/2018 39.1  35.0 - 47.0 % Final    MCV 03/15/2018 96.1  80.0 - 99.0 FL Final    MCH 03/15/2018 32.2  26.0 - 34.0 PG Final    MCHC 03/15/2018 33.5  30.0 - 36.5 g/dL Final    RDW 03/15/2018 12.1  11.5 - 14.5 % Final    PLATELET  612* 150 - 400 K/uL Final    MPV 03/15/2018 9.3  8.9 - 12.9 FL Final    NRBC 03/15/2018 0.0  0  WBC Final    ABSOLUTE NRBC 03/15/2018 0.00  0.00 - 0.01 K/uL Final    NEUTROPHILS 03/15/2018 55  32 - 75 % Final    LYMPHOCYTES 03/15/2018 30  12 - 49 % Final    MONOCYTES 03/15/2018 10  5 - 13 % Final    EOSINOPHILS 03/15/2018 4  0 - 7 % Final    BASOPHILS 03/15/2018 1  0 - 1 % Final    IMMATURE GRANULOCYTES 03/15/2018 0  0.0 - 0.5 % Final    ABS. NEUTROPHILS 03/15/2018 3.5  1.8 - 8.0 K/UL Final    ABS. LYMPHOCYTES 03/15/2018 1.9  0.8 - 3.5 K/UL Final    ABS. MONOCYTES 03/15/2018 0.7  0.0 - 1.0 K/UL Final    ABS. EOSINOPHILS 03/15/2018 0.2  0.0 - 0.4 K/UL Final    ABS. BASOPHILS 03/15/2018 0.1  0.0 - 0.1 K/UL Final    ABS. IMM. GRANS. 03/15/2018 0.0  0.00 - 0.04 K/UL Final    DF 03/15/2018 AUTOMATED    Final    Protein, total 03/15/2018 6.5  6.4 - 8.2 g/dL Final    Albumin 03/15/2018 3.4* 3.5 - 5.0 g/dL Final    Globulin 03/15/2018 3.1  2.0 - 4.0 g/dL Final    A-G Ratio 03/15/2018 1.1  1.1 - 2.2   Final    Bilirubin, total 03/15/2018 0.4  0.2 - 1.0 MG/DL Final    Bilirubin, direct 03/15/2018 0.1  0.0 - 0.2 MG/DL Final    Alk.  phosphatase 03/15/2018 83  45 - 117 U/L Final    AST (SGOT) 03/15/2018 16  15 - 37 U/L Final    ALT (SGPT) 03/15/2018 20  12 - 78 U/L Final    Sodium 03/15/2018 135* 136 - 145 mmol/L Final    Potassium 03/15/2018 4.4  3.5 - 5.1 mmol/L Final    Chloride 03/15/2018 100  97 - 108 mmol/L Final    CO2 03/15/2018 29  21 - 32 mmol/L Final    Anion gap 03/15/2018 6  5 - 15 mmol/L Final    Glucose 03/15/2018 202* 65 - 100 mg/dL Final    BUN 03/15/2018 9  6 - 20 MG/DL Final    Creatinine 03/15/2018 0.50* 0.55 - 1.02 MG/DL Final    BUN/Creatinine ratio 03/15/2018 18  12 - 20   Final    GFR est AA 03/15/2018 >60  >60 ml/min/1.73m2 Final    GFR est non-AA 03/15/2018 >60  >60 ml/min/1.73m2 Final    Calcium 03/15/2018 8.8  8.5 - 10.1 MG/DL Final   Hospital Outpatient Visit on 03/12/2018   Component Date Value Ref Range Status    WBC 03/12/2018 8.8  3.6 - 11.0 K/uL Final    RBC 03/12/2018 4.09  3.80 - 5.20 M/uL Final    HGB 03/12/2018 13.1  11.5 - 16.0 g/dL Final    HCT 03/12/2018 39.2  35.0 - 47.0 % Final    MCV 03/12/2018 95.8  80.0 - 99.0 FL Final    MCH 03/12/2018 32.0  26.0 - 34.0 PG Final    MCHC 03/12/2018 33.4  30.0 - 36.5 g/dL Final    RDW 03/12/2018 12.0  11.5 - 14.5 % Final    PLATELET 49/72/8409 767* 150 - 400 K/uL Final    MPV 03/12/2018 8.9  8.9 - 12.9 FL Final    NRBC 03/12/2018 0.0  0  WBC Final    ABSOLUTE NRBC 03/12/2018 0.00  0.00 - 0.01 K/uL Final    NEUTROPHILS 03/12/2018 63  32 - 75 % Final    LYMPHOCYTES 03/12/2018 23  12 - 49 % Final    MONOCYTES 03/12/2018 9  5 - 13 % Final    EOSINOPHILS 03/12/2018 3  0 - 7 % Final    BASOPHILS 03/12/2018 1  0 - 1 % Final    IMMATURE GRANULOCYTES 03/12/2018 1* 0.0 - 0.5 % Final    ABS. NEUTROPHILS 03/12/2018 5.5  1.8 - 8.0 K/UL Final    ABS. LYMPHOCYTES 03/12/2018 2.0  0.8 - 3.5 K/UL Final    ABS. MONOCYTES 03/12/2018 0.8  0.0 - 1.0 K/UL Final    ABS. EOSINOPHILS 03/12/2018 0.3  0.0 - 0.4 K/UL Final    ABS. BASOPHILS 03/12/2018 0.1  0.0 - 0.1 K/UL Final    ABS. IMM. GRANS. 03/12/2018 0.1* 0.00 - 0.04 K/UL Final    DF 03/12/2018 AUTOMATED    Final    Sodium 03/12/2018 134* 136 - 145 mmol/L Final    Potassium 03/12/2018 3.7  3.5 - 5.1 mmol/L Final    Chloride 03/12/2018 98  97 - 108 mmol/L Final    CO2 03/12/2018 28  21 - 32 mmol/L Final    Anion gap 03/12/2018 8  5 - 15 mmol/L Final    Glucose 03/12/2018 241* 65 - 100 mg/dL Final    BUN 03/12/2018 13  6 - 20 MG/DL Final    Creatinine 03/12/2018 0.58  0.55 - 1.02 MG/DL Final    BUN/Creatinine ratio 03/12/2018 22* 12 - 20   Final    GFR est AA 03/12/2018 >60  >60 ml/min/1.73m2 Final    GFR est non-AA 03/12/2018 >60  >60 ml/min/1.73m2 Final    Calcium 03/12/2018 8.8  8.5 - 10.1 MG/DL Final    Protein, total 03/12/2018 7.2  6.4 - 8.2 g/dL Final    Albumin 03/12/2018 3.4* 3.5 - 5.0 g/dL Final    Globulin 03/12/2018 3.8  2.0 - 4.0 g/dL Final    A-G Ratio 03/12/2018 0.9* 1.1 - 2.2   Final    Bilirubin, total 03/12/2018 0.3  0.2 - 1.0 MG/DL Final    Bilirubin, direct 03/12/2018 0.1  0.0 - 0.2 MG/DL Final    Alk.  phosphatase 03/12/2018 94  45 - 117 U/L Final    AST (SGOT) 03/12/2018 19  15 - 37 U/L Final    ALT (SGPT) 03/12/2018 24  12 - 78 U/L Final   Hospital Outpatient Visit on 03/08/2018   Component Date Value Ref Range Status    WBC 03/08/2018 7.9  3.6 - 11.0 K/uL Final    RBC 03/08/2018 3.98  3.80 - 5.20 M/uL Final    HGB 03/08/2018 13.0  11.5 - 16.0 g/dL Final    HCT 03/08/2018 37.9  35.0 - 47.0 % Final    MCV 03/08/2018 95.2  80.0 - 99.0 FL Final    MCH 03/08/2018 32.7  26.0 - 34.0 PG Final    MCHC 03/08/2018 34.3  30.0 - 36.5 g/dL Final    RDW 03/08/2018 11.9  11.5 - 14.5 % Final    PLATELET 95/34/8760 296* 150 - 400 K/uL Final    MPV 03/08/2018 8.9  8.9 - 12.9 FL Final    NRBC 03/08/2018 0.0  0  WBC Final    ABSOLUTE NRBC 03/08/2018 0.00  0.00 - 0.01 K/uL Final    NEUTROPHILS 03/08/2018 57  32 - 75 % Final    LYMPHOCYTES 03/08/2018 26  12 - 49 % Final    MONOCYTES 03/08/2018 11  5 - 13 % Final    EOSINOPHILS 03/08/2018 5  0 - 7 % Final    BASOPHILS 03/08/2018 1  0 - 1 % Final    IMMATURE GRANULOCYTES 03/08/2018 1* 0.0 - 0.5 % Final    ABS. NEUTROPHILS 03/08/2018 4.5  1.8 - 8.0 K/UL Final    ABS. LYMPHOCYTES 03/08/2018 2.0  0.8 - 3.5 K/UL Final    ABS. MONOCYTES 03/08/2018 0.9  0.0 - 1.0 K/UL Final    ABS. EOSINOPHILS 03/08/2018 0.4  0.0 - 0.4 K/UL Final    ABS. BASOPHILS 03/08/2018 0.1  0.0 - 0.1 K/UL Final    ABS. IMM.  GRANS. 03/08/2018 0.0  0.00 - 0.04 K/UL Final    DF 03/08/2018 AUTOMATED    Final    Sodium 03/08/2018 133* 136 - 145 mmol/L Final    Potassium 03/08/2018 4.3  3.5 - 5.1 mmol/L Final    Chloride 03/08/2018 97  97 - 108 mmol/L Final    CO2 03/08/2018 28  21 - 32 mmol/L Final    Anion gap 03/08/2018 8  5 - 15 mmol/L Final    Glucose 03/08/2018 380* 65 - 100 mg/dL Final    BUN 03/08/2018 13  6 - 20 MG/DL Final    Creatinine 03/08/2018 0.84  0.55 - 1.02 MG/DL Final    BUN/Creatinine ratio 03/08/2018 15  12 - 20   Final    GFR est AA 03/08/2018 >60  >60 ml/min/1.73m2 Final    GFR est non-AA 03/08/2018 >60  >60 ml/min/1.73m2 Final    Calcium 03/08/2018 9.2 8.5 - 10.1 MG/DL Final    Protein, total 03/08/2018 6.8  6.4 - 8.2 g/dL Final    Albumin 03/08/2018 3.0* 3.5 - 5.0 g/dL Final    Globulin 03/08/2018 3.8  2.0 - 4.0 g/dL Final    A-G Ratio 03/08/2018 0.8* 1.1 - 2.2   Final    Bilirubin, total 03/08/2018 0.3  0.2 - 1.0 MG/DL Final    Bilirubin, direct 03/08/2018 0.1  0.0 - 0.2 MG/DL Final    Alk. phosphatase 03/08/2018 102  45 - 117 U/L Final    AST (SGOT) 03/08/2018 10* 15 - 37 U/L Final    ALT (SGPT) 03/08/2018 26  12 - 78 U/L Final   Admission on 02/28/2018, Discharged on 03/05/2018   No results displayed because visit has over 200 results. Office Visit on 01/09/2018   Component Date Value Ref Range Status    Atopobium vaginae 01/09/2018 Moderate - 1  Score Final    BVAB 2 01/09/2018 Moderate - 1  Score Final    Megasphaera 1 01/09/2018 High - 2* Score Final    C. trachomatis by ANITA 01/09/2018 Negative  Negative Final    N. gonorrhoeae by ANITA 01/09/2018 Negative  Negative Final    T. vaginalis by ANITA 01/09/2018 Negative  Negative Final       Review of Systems   Unable to perform ROS: Other   Constitutional: Negative for chills and fever. HENT: Negative for congestion. Respiratory: Negative for cough and shortness of breath. Gastrointestinal: Negative for abdominal pain and heartburn. Musculoskeletal: Negative for joint pain and myalgias. Visit Vitals    /68 (BP 1 Location: Right arm, BP Patient Position: Sitting)    Pulse 86    Temp 99 °F (37.2 °C) (Oral)    Resp 16    Ht 5' 4.5\" (1.638 m)    Wt 141 lb 3.2 oz (64 kg)    LMP 02/21/2018    SpO2 98%    BMI 23.86 kg/m2       Physical Exam   Constitutional: She is oriented to person, place, and time. No distress. HENT:   Right Ear: External ear normal.   Left Ear: External ear normal.   Neck: Normal range of motion. Neck supple. Cardiovascular: Normal rate and regular rhythm. Pulmonary/Chest: Effort normal and breath sounds normal.   Abdominal: Soft.  Bowel sounds are normal.   Neurological: She is alert and oriented to person, place, and time. Skin: She is not diaphoretic. Psychiatric: She has a normal mood and affect. Her behavior is normal.   Nursing note and vitals reviewed. ASSESSMENT and PLAN    ICD-10-CM ICD-9-CM    1. Insulin dependent diabetes mellitus type IA ([facility] E10.9 250.01 Noncompliant and does not want to discuss her medications. 2. Borderline personality disorder F60.3 301.83 She is agitated today and just wants to have her paperwork completed. Discussed I will not give her any intermittent FMLA since discharge, but will fill out paperwork for the time she was in the hospital. She was not happy and kept saying throughout the appointment that she did not want to come back. I did discuss with my  her unprofessional behavior, and we will send her a letter that she should not return to the practice, which she keeps telling us but returns for appointments anyway. No further paperwork will be filled out for her from our office. This plan was reviewed with the patient and patient agrees. All questions were answered. This scribe documentation was reviewed by me and accurately reflects the examination and decisions made by me. This note will not be viewable in [address]

## 2018-03-20 NOTE — PROGRESS NOTES
Patient is here for paperwork for time off while she was in the hospital.    Visit Vitals    /68 (BP 1 Location: Right arm, BP Patient Position: Sitting)    Pulse 86    Temp 99 °F (37.2 °C) (Oral)    Resp 16    Ht 5' 4.5\" (1.638 m)    Wt 141 lb 3.2 oz (64 kg)    SpO2 98%    BMI 23.86 kg/m2     1. Have you been to the ER, urgent care clinic since your last visit? Hospitalized since your last visit? No    2. Have you seen or consulted any other health care providers outside of the [address] [place] [address] since your last visit? Include any pap smears or colon screening.  No

## 2018-03-22 ENCOUNTER — DOCUMENTATION ONLY (OUTPATIENT)
Dept: INTERNAL MEDICINE CLINIC | Age: 28
End: 2018-03-22

## 2018-04-09 ENCOUNTER — DOCUMENTATION ONLY (OUTPATIENT)
Dept: INTERNAL MEDICINE CLINIC | Age: 28
End: 2018-04-09

## 2018-04-09 NOTE — PROGRESS NOTES
Spoke with Dr Romel Cameron about the insulin pump paperwork and Dr Romel Cameron will not sign and states that the patient needs to go to endocrinologist and establish history with endocrinologist

## 2018-04-09 NOTE — PROGRESS NOTES
from [address] [employer] insulin pump company came in today to complete paper work for Patient. Patient told representative that we would be seeing her until she is established with endocrinologist and new PCP. Patient states that she contacted this office last Thursday about the pump. No notes or encounters found about patient contacting this office. Dr Bindu Escamilla has not ordered a pump for the patient. Advised  that doctor Bindu Escamilla is busy seeing patients at this time and will not have time to see the rep to complete the paper work.   Will Inquire if Dr Bindu Escamilla will sign this paperwork for an order the endocrinologist will not sign until she has a history with the endocrinologist.

## 2018-04-12 ENCOUNTER — PATIENT OUTREACH (OUTPATIENT)
Dept: INTERNAL MEDICINE CLINIC | Age: 28
End: 2018-04-12

## 2018-04-12 NOTE — PROGRESS NOTES
Patient is no longer a patient of our practice - refer to PCP last OV note.  NN will now close this episode and remove Dr Juan Jose Foster as PCP

## 2018-05-03 ENCOUNTER — OFFICE VISIT (OUTPATIENT)
Dept: INTERNAL MEDICINE CLINIC | Age: 28
End: 2018-05-03

## 2018-05-03 VITALS
RESPIRATION RATE: 14 BRPM | WEIGHT: 134.4 LBS | DIASTOLIC BLOOD PRESSURE: 78 MMHG | HEIGHT: 65 IN | SYSTOLIC BLOOD PRESSURE: 136 MMHG | TEMPERATURE: 99 F | OXYGEN SATURATION: 98 % | HEART RATE: 93 BPM | BODY MASS INDEX: 22.39 KG/M2

## 2018-05-03 DIAGNOSIS — F60.3 BORDERLINE PERSONALITY DISORDER (HCC): ICD-10-CM

## 2018-05-03 DIAGNOSIS — F41.1 GENERALIZED ANXIETY DISORDER: ICD-10-CM

## 2018-05-03 DIAGNOSIS — L70.0 CYSTIC ACNE: ICD-10-CM

## 2018-05-03 DIAGNOSIS — E10.9 INSULIN DEPENDENT DIABETES MELLITUS TYPE IA (HCC): Primary | ICD-10-CM

## 2018-05-03 RX ORDER — CEPHALEXIN 500 MG/1
500 CAPSULE ORAL 3 TIMES DAILY
Qty: 20 CAP | Refills: 0 | Status: SHIPPED | OUTPATIENT
Start: 2018-05-03

## 2018-05-03 NOTE — PROGRESS NOTES
Subjective:     Chief Complaint   Patient presents with    New Patient     She  is a 34y.o. year old female who presents for evaluation. Patient is here to request establishment of care. Previous records were reviewed in detail. Type I DM since . Gives herself a 5/10 on control. [id number] did better (8-9/10). The last few years hasn't been as good (6/10). Was hospitalized in February due to DKA from a  infection. Is eating better now. Past due for eye exam.    Is going to be seeing an Endocrinologist.  Has in implanted BCP in arm. Has developed cystic acne on face. FH:  HTN / DM. Historical Data    Past Medical History:   Diagnosis Date         Diabetes mellitus         No past surgical history on file. Outpatient Encounter Prescriptions as of 5/3/2018   Medication Sig Dispense Refill    insulin glargine (LANTUS,BASAGLAR) 100 unit/mL (3 mL) inpn 32 units daily 1 Pen 1    insulin lispro (HUMALOG) 100 unit/mL injection 4 units TIDAC plus SSI    INITIATE CORRECTIVE INSULIN PROTOCOL (MAEGAN):  RX MAEGAN Normal Sensitivity (Average weight)    AC (before meals), Q6H, and Q4H CORRECTIONAL SCALE only For Blood Sugar (mg/dl) of :             140-199=2 units            200-249=3 units  250-299=5 units  300-349=7 units  350 or g 1 Vial 0    simvastatin (ZOCOR) 20 mg tablet Take 10 mg by mouth nightly.  ERGOCALCIFEROL, VITAMIN D2, (VITAMIN D PO) Take 1,000 Units by mouth.  aspirin 81 mg tablet Take 81 mg by mouth.  acetaminophen (TYLENOL) 500 mg tablet Take 1,000 mg by mouth every six (6) hours as needed for Pain or Fever.  oseltamivir (TAMIFLU) 75 mg capsule Take 75 mg by mouth two (2) times a day.  doxylamine-dm-acetaminophen (VICKS NYQUIL COLD/FLU LIQUICAP) 6.25- mg cap Take 1 Cap by mouth nightly.  naproxen (NAPROSYN) 250 mg tablet Take 500 mg by mouth two (2) times daily as needed for Pain or Fever.        No facility-administered encounter medications on file as of 5/3/2018. No Known Allergies     Social History     Social History    Marital status: SINGLE     Spouse name: N/A    Number of children: N/A    Years of education: N/A     Occupational History    Not on file. Social History Main Topics    Smoking status: Former Smoker     Packs/day: 0.00    Smokeless tobacco: Never Used      Comment: quit date 12/8/17    Alcohol use No    Drug use: No    Sexual activity: Yes     Partners: Male     Other Topics Concern    Not on file     Social History Narrative        Review of Systems  A comprehensive review of systems was negative except for that written in the HPI. Objective:     Vitals:    05/03/18 1311   BP: 136/78   Pulse: 93   Resp: 14   Temp: 99 °F (37.2 °C)   TempSrc: Oral   SpO2: 98%   Weight: 134 lb 6.4 oz (61 kg)   Height: 5' 4.5\" (1.638 m)     Pleasant WF in no acute distress. Skin: cystic acne on face. Neck: Supple. Cardiac: RRR without murmurs, gallops or rubs. Lungs: Clear to auscultation. Abdomen: Benign. Diabetic foot exam:     Left: Reflexes 1+     Vibratory sensation normal    Proprioception normal   Sharp/dull discrimination normal    Filament test normal sensation with micro filament   Pulse DP: 2+ (normal)   Pulse PT: 2+ (normal)   Deformities: None  Right: Reflexes 1+   Vibratory sensation normal   Proprioception normal   Sharp/dull discrimination normal   Filament test normal sensation with micro filament   Pulse DP: 2+ (normal)   Pulse PT: 2+ (normal)   Deformities: None    ASSESSMENT / PLAN:   1. Insulin dependent diabetes mellitus type IA ([facility]  · History of sub-optimal control. · Going to establish with Endocrinologist.  · Interested in insulin pump. · Last A1c was not good. · On statin currently. · Encouraged compliance with her diet. 2. Cystic acne    - cephALEXin (KEFLEX) 500 mg capsule; Take 1 Cap by mouth three (3) times daily. Dispense: 20 Cap; Refill: 0    3.  Borderline personality disorder  · Stable interaction today. 4. Generalized anxiety disorder  · Seeing a counselor. Follow-up Disposition: Not on File   Advised her to call back or return to office if symptoms worsen/change/persist.  Discussed expected course/resolution/complications of diagnosis in detail with patient. Medication risks/benefits/costs/interactions/alternatives discussed with patient. She was given an after visit summary which includes diagnoses, current medications, & vitals. She expressed understanding with the diagnosis and plan.

## 2018-05-03 NOTE — PROGRESS NOTES
Chief Complaint   Patient presents with    New Patient     Reviewed record in preparation for visit and have obtained necessary documentation. Identified pt with two pt identifiers(name and ). Health Maintenance Due   Topic    MICROALBUMIN Q1     EYE EXAM RETINAL OR DILATED Q1          Chief Complaint   Patient presents with    New Patient        Wt Readings from Last 3 Encounters:   18 134 lb 6.4 oz (61 kg)   18 141 lb 3.2 oz (64 kg)   18 137 lb (62.1 kg)     Temp Readings from Last 3 Encounters:   18 99 °F (37.2 °C) (Oral)   18 99 °F (37.2 °C) (Oral)   03/15/18 98.6 °F (37 °C)     BP Readings from Last 3 Encounters:   18 136/78   18 102/68   03/15/18 120/70     Pulse Readings from Last 3 Encounters:   18 93   18 86   03/15/18 98           Learning Assessment:  :     Learning Assessment 3/9/2018 2017   PRIMARY LEARNER Patient Patient   HIGHEST LEVEL OF EDUCATION - PRIMARY LEARNER  2 YEARS OF COLLEGE 2 YEARS OF COLLEGE   BARRIERS PRIMARY LEARNER - NONE   CO-LEARNER CAREGIVER No No   PRIMARY LANGUAGE ENGLISH ENGLISH   LEARNER PREFERENCE PRIMARY DEMONSTRATION DEMONSTRATION   ANSWERED BY patient  patient   RELATIONSHIP SELF SELF       Depression Screening:  :     PHQ over the last two weeks 3/20/2018   Little interest or pleasure in doing things Not at all   Feeling down, depressed or hopeless Not at all   Total Score PHQ 2 0       Fall Risk Assessment:  :     No flowsheet data found. Abuse Screening:  :     Abuse Screening Questionnaire 2017   Do you ever feel afraid of your partner? N   Are you in a relationship with someone who physically or mentally threatens you? N   Is it safe for you to go home?  Y       Coordination of Care Questionnaire:  :     1) Have you been to an emergency room, urgent care clinic since your last visit? no   Hospitalized since your last visit? no             2) Have you seen or consulted any other health care providers outside of [address] [place] [address] since your last visit? no  (Include any pap smears or colon screenings in this section.)    3) Do you have an Advance Directive on file? no    4) Are you interested in receiving information on Advance Directives? NO      Patient is accompanied by self I have received verbal consent from Cyndie Ribera to discuss any/all medical information while they are present in the room. Reviewed record  In preparation for visit and have obtained necessary documentation.

## 2018-05-15 ENCOUNTER — TELEPHONE (OUTPATIENT)
Dept: INTERNAL MEDICINE CLINIC | Age: 28
End: 2018-05-15

## 2018-05-15 ENCOUNTER — OFFICE VISIT (OUTPATIENT)
Dept: ENDOCRINOLOGY | Age: 28
End: 2018-05-15

## 2018-05-15 VITALS
WEIGHT: 140.8 LBS | DIASTOLIC BLOOD PRESSURE: 73 MMHG | HEIGHT: 64 IN | HEART RATE: 78 BPM | SYSTOLIC BLOOD PRESSURE: 121 MMHG | BODY MASS INDEX: 24.04 KG/M2

## 2018-05-15 DIAGNOSIS — E10.10 TYPE 1 DIABETES MELLITUS WITH KETOACIDOSIS WITHOUT COMA (HCC): Primary | ICD-10-CM

## 2018-05-15 RX ORDER — INSULIN LISPRO 100 [IU]/ML
INJECTION, SOLUTION INTRAVENOUS; SUBCUTANEOUS
Qty: 1 PACKAGE | Refills: 3 | Status: SHIPPED | OUTPATIENT
Start: 2018-05-15 | End: 2018-05-16 | Stop reason: SDUPTHER

## 2018-05-15 NOTE — TELEPHONE ENCOUNTER
Patient would like to know the status of her short term disability forms, it was sent yesterday by [address].

## 2018-05-15 NOTE — MR AVS SNAPSHOT
[address] [facility] 
[phone] Patient: Jovany Jordan MRN: [id number]:[id number] Visit Information Date & Time Provider Department Dept. Phone Encounter #  
 5/15/2018  2:50 PM Erinn Asencio, [address] [facility] Diabetes and Endocrinology [phone] [id number] Upcoming Health Maintenance Date Due MICROALBUMIN Q1 2/22/2018 EYE EXAM RETINAL OR DILATED Q1 2/26/2018 FOOT EXAM Q1 7/26/2018 Influenza Age 5 to Adult 8/1/2018 HEMOGLOBIN A1C Q6M 9/3/2018 LIPID PANEL Q1 11/21/2018 PAP AKA CERVICAL CYTOLOGY 11/21/2020 DTaP/Tdap/Td series (2 - Td) 10/13/2025 Allergies as of 5/15/2018  Review Complete On: 5/15/2018 By: Kyle Yeboah No Known Allergies Current Immunizations  Reviewed on 3/16/2018 No immunizations on file. Not reviewed this visit You Were Diagnosed With   
  
 Codes Comments Type 1 diabetes mellitus with ketoacidosis without coma ([facility]    -  Primary ICD-10-CM: E10.10 ICD-9-CM: 250.13 Vitals BP Pulse Height(growth percentile) Weight(growth percentile) LMP BMI  
 121/73 (BP 1 Location: Left arm, BP Patient Position: Sitting) 78 5' 4\" (1.626 m) 140 lb 12.8 oz (63.9 kg) 05/06/2018 24.17 kg/m2 OB Status Smoking Status IUD Former Smoker Vitals History BMI and BSA Data Body Mass Index Body Surface Area  
 24.17 kg/m 2 1.7 m 2 Preferred Pharmacy Pharmacy Name Phone [facility] [facility], [address] [place] [address],[facility] Floor [phone] Your Updated Medication List  
  
   
This list is accurate as of 5/15/18  3:19 PM.  Always use your most recent med list.  
  
  
  
  
 aspirin 81 mg tablet Take 81 mg by mouth. cephALEXin 500 mg capsule Commonly known as:  Earmon Gravely Take 1 Cap by mouth three (3) times daily. insulin glargine 300 unit/mL (1.5 mL) Inpn Commonly known as:  TOUJEO SOLOSTAR U-300 INSULIN  
32 Units by SubCUTAneous route daily. insulin lispro 100 unit/mL kwikpen Commonly known as:  HUMALOG  
6-12 units TIDcc  
  
 simvastatin 20 mg tablet Commonly known as:  ZOCOR Take 10 mg by mouth nightly. VITAMIN D2 PO Take 1,000 Units by mouth. Prescriptions Sent to Pharmacy Refills  
 insulin glargine (TOUJEO SOLOSTAR U-300 INSULIN) 300 unit/mL (1.5 mL) inpn 3 Si Units by SubCUTAneous route daily. Class: Normal  
 Pharmacy: [address] [place] [address] [address] [facility], [address] [place] [address],[address] Ph #: [phone] Route: SubCUTAneous  
 insulin lispro (HUMALOG) 100 unit/mL kwikpen 3 Si-12 units TIDcc Class: Normal  
 Pharmacy: [address] [place] [address] [address] [facility], [address] [place] [address],[facility] Floor Ph #: [phone] We Performed the Following HEMOGLOBIN A1C WITH EAG [20896 CPT(R)] LIPID PANEL [58674 CPT(R)] MICROALBUMIN, UR, RAND W/ MICROALB/CREAT RATIO [id number] CPT(R)] REFERRAL TO DI[facility]ET[facility] [id number] Custom] Comments:  
 T1DM, carb counting eduction and pump introduction TSH AND FREE T4 [95470 CPT(R)] Referral Information Referral ID Referred By Referred To  
  
 [id number] Lynne Mooney, CDE   
   [facility] Visits Status Start Date End Date 1 New Request 5/15/18 5/15/19 If your referral has a status of pending review or denied, additional information will be sent to support the outcome of this decision. Patient Instructions Diabetes Instructions: 
- switch Lantus to Toujeo: take 32 units daily 
- take Humalog 6 units with meals - when you switch to carb counting, start with 1 unit for every 10 grams of carbs (ie: a meal with 60 grams carbs = 6 units) - Correction Scale: If blood sugar is over 150 before a meal, add extra Humalog insulin according to the scale below: 150-199: 1 units 200-249: 2 units 250-299: 3 units 300-349: 4 units 350-399: 5 units Over 400: 6 units and notify me Check blood sugar at least FOUR times a day: before each meal and at bedtime. Keep a record of your values and bring this or your glucometer with you to your next visit. Diet instructions: 
Reduce the amount of carbohydrates in your diet. This means not just avoiding sweets, sodas, or desserts but also reducing the amount of bread, pasta, rice, potatoes, corn, and crackers that you eat. Do not have more than one serving of carbs per meal (for example: do not eat a sandwich and potato chips in the same meal). Focus on eating mostly protein (meat, poultry, fish, shellfish, eggs), healthy fats (avocados, nuts, cheese, olive or coconut oil) and non-starchy vegetables (greens, carrots, tomatoes, bell peppers, broccoli, brussels sprouts, green beans, etc). If you fill yourself up with these foods, you won't even want the carbs. Introducing [facility]! Dear Erin Pascual: 
Thank you for requesting a [employer] account. Our records indicate that you already have an active [employer] account. You can access your account anytime at [email or web address] Did you know that you can access your hospital and ER discharge instructions at any time in [employer]? You can also review all of your test results from your hospital stay or ER visit. Additional Information If you have questions, please visit the Frequently Asked Questions section of the [employer] website at [email or web address]. Remember, [employer] is NOT to be used for urgent needs. For medical emergencies, dial 911. Now available from your iPhone and Android! Please provide this summary of care documentation to your next provider. Your primary care clinician is listed as Bettina Kumar. If you have any questions after today's visit, please call [phone].

## 2018-05-15 NOTE — PATIENT INSTRUCTIONS
Diabetes Instructions:  - switch Lantus to Toujeo: take 32 units daily  - take Humalog 6 units with meals   - when you switch to carb counting, start with 1 unit for every 10 grams of carbs (ie: a meal with 60 grams carbs = 6 units)  - Correction Scale: If blood sugar is over 150 before a meal, add extra Humalog insulin according to the scale below:  150-199: 1 units  200-249: 2 units  250-299: 3 units   300-349: 4 units  350-399: 5 units  Over 400: 6 units and notify me     Check blood sugar at least FOUR times a day: before each meal and at bedtime. Keep a record of your values and bring this or your glucometer with you to your next visit. Diet instructions:  Reduce the amount of carbohydrates in your diet. This means not just avoiding sweets, sodas, or desserts but also reducing the amount of bread, pasta, rice, potatoes, corn, and crackers that you eat. Do not have more than one serving of carbs per meal (for example: do not eat a sandwich and potato chips in the same meal). Focus on eating mostly protein (meat, poultry, fish, shellfish, eggs), healthy fats (avocados, nuts, cheese, olive or coconut oil) and non-starchy vegetables (greens, carrots, tomatoes, bell peppers, broccoli, brussels sprouts, green beans, etc). If you fill yourself up with these foods, you won't even want the carbs.

## 2018-05-15 NOTE — PROGRESS NOTES
Tung Diaz is a 34 y.o. female      Chief Complaint   Patient presents with    New Patient     Reffered by [facility]    Other     Diabetes Ketoacidosis       1. Have you been to the ER, urgent care clinic since your last visit? Yes, 2/28/18 at [facility] for DKA  Hospitalized since your last visit? No    2. Have you seen or consulted any other health care providers outside of the [facility] since your last visit? Include any pap smears or colon screening.  No

## 2018-05-15 NOTE — PROGRESS NOTES
Endocrinology New Patient Visit    Chief Complaint: Type 1 diabetes    Referring provider: Kel Sharma MD    History of Present Illness: Bjian Johnson is a 34 y.o. female who was referred for evaluation of uncontrolled type 1 diabetes mellitus with last A1c 11% in March during her recent admission for DKA. She was diagnosed with diabetes in 2011. She saw Dr Dwight Singh once in October 2011 but has not seen an endocrinologist since then. She says she has come to the realization that she needs to get serious about her diabetes care. Often feels tired and \"off\" when her blood sugars are too high or too low. Current glycemic medication regimen is Humalog 4-10 units with meals and Lantus 32 units Qhs. Admits she has a hard time taking Lantus at the same time every day. She has a sliding scale for Humalog but does not always use it. She has not been educated on carb counting but is interested in this and insulin pump therapy, particularly the OmniPod. Home blood glucose monitoring frequency: 3 times/day (before each meal)  Glucose range at home: fastings usually around 150; sometimes 200-300 later in the day  The patient has had hypoglycemia, lowest was 50. Lows are typically from taking too much insulin with her meals. She has no known complications from diabetes but has not had a microalbumin or eye exam recently. Weight is stable, BMI is 24. She admits her diet hasn't been the best in the past but she is trying to do better and learning how to cook. Trying to eat more chicken, fish, and vegetables and less processed foods. Eats 2-3 meals/day and does not snack often. She works at the [employer], previously worked at [employer].      Review of Systems as above, otherwise a 10 pt review is negative     Problem List:  Patient Active Problem List   Diagnosis Code    Borderline personality disorder F60.3    Insulin dependent diabetes mellitus type IA ([facility] E10.9    Generalized anxiety disorder F41.1    DKA (diabetic ketoacidoses) ([facility] E13.10       Past Medical History:  Past Medical History:   Diagnosis Date         Diabetes mellitus        Past Surgical History:  History reviewed. No pertinent surgical history. Social History:  Social History     Social History    Marital status: SINGLE     Spouse name: N/A    Number of children: N/A    Years of education: N/A     Occupational History    Not on file. Social History Main Topics    Smoking status: Former Smoker     Packs/day: 0.00    Smokeless tobacco: Never Used      Comment: quit date 17    Alcohol use No    Drug use: No    Sexual activity: Yes     Partners: Male     Other Topics Concern    Not on file     Social History Narrative       Family History:  Family History   Problem Relation Age of Onset    Cancer Paternal Grandfather        Medications:     Current Outpatient Prescriptions:     insulin glargine (LANTUS,BASAGLAR) 100 unit/mL (3 mL) inpn, 32 units daily, Disp: 1 Pen, Rfl: 1    insulin lispro (HUMALOG) 100 unit/mL injection, 4 units TIDAC plus SSI  INITIATE CORRECTIVE INSULIN PROTOCOL (MAEGAN): RX MAEGAN Normal Sensitivity (Average weight)  AC (before meals), Q6H, and Q4H CORRECTIONAL SCALE only For Blood Sugar (mg/dl) of :            140-199=2 units           200-249=3 units 250-299=5 units 300-349=7 units 350 or g, Disp: 1 Vial, Rfl: 0    simvastatin (ZOCOR) 20 mg tablet, Take 10 mg by mouth nightly., Disp: , Rfl:     ERGOCALCIFEROL, VITAMIN D2, (VITAMIN D PO), Take 1,000 Units by mouth., Disp: , Rfl:     aspirin 81 mg tablet, Take 81 mg by mouth., Disp: , Rfl:     cephALEXin (KEFLEX) 500 mg capsule, Take 1 Cap by mouth three (3) times daily. , Disp: 20 Cap, Rfl: 0    Allergies:  No Known Allergies    Physical Examination:  Blood pressure 121/73, pulse 78, height 5' 4\" (1.626 m), weight 140 lb 12.8 oz (63.9 kg), last menstrual period 2018.     Gen: no acute distress  HEENT: mucous membranes moist  Thyroid: no enlargement or nodules noted  CAD: normal rate, regular rhythm. No murmur rubs or gallops  PULM: clear to ausculation, no wheezes, rhonchis or rales. EXT: no clubbing, cyanosis or edema  Neuro: grossly non focal  Psych: pleasant, good insight into medical hx  Skin: warm, dry, professional tattoos present      Clinical Data Review:  Lab Results   Component Value Date/Time    Hemoglobin A1c 11.0 (H) 03/03/2018 02:58 AM     Lab Results   Component Value Date/Time    Sodium 135 (L) 03/15/2018 11:36 AM    Potassium 4.4 03/15/2018 11:36 AM    Chloride 100 03/15/2018 11:36 AM    CO2 29 03/15/2018 11:36 AM    Anion gap 6 03/15/2018 11:36 AM    Glucose 202 (H) 03/15/2018 11:36 AM    BUN 9 03/15/2018 11:36 AM    Creatinine 0.50 (L) 03/15/2018 11:36 AM    BUN/Creatinine ratio 18 03/15/2018 11:36 AM    GFR est AA >60 03/15/2018 11:36 AM    GFR est non-AA >60 03/15/2018 11:36 AM    Calcium 8.8 03/15/2018 11:36 AM     No results found for: MCACR, MCA1, MCA2, MCA3, MCAU, MCAU2, MCALPOCT  Lab Results   Component Value Date/Time    Cholesterol, total 189 02/22/2017 09:40 AM    HDL Cholesterol 85 02/22/2017 09:40 AM    LDL, calculated 95 02/22/2017 09:40 AM    VLDL, calculated 9 02/22/2017 09:40 AM    Triglyceride 46 02/22/2017 09:40 AM     No results found for: TSH, TSHEXT, TSH2, TSH3, TSHP, TSHELE, TT3, T3U, T3UP, FRT3, FT3, T3T, FT4, FT4P, T4, T4P, FT4T, TT7, TSHEXT     Assessment and Plan:  Patient is a 34 y.o. female here for type 1 diabetes with suboptimal control. Discussed strategies for improving control including carb counting for increasing the accuracy of mealtime doses, CGM use, and pump therapy. Will work towards pump therapy by having her learn to carb count as below. Also submitted coverage determination for Dexcom.     Glycemic Medication Changes:  - switch Lantus to Toujeo: take 32 units daily  - take Humalog 6 units with meals   - after learning to carb count: start with 1:10 carb ratio  - correction: 1 unit for each 50>150    DM Health Maintenance: pertinent items updated in HM tab  A1c: update today  Cv/Lipids: not on statin, update lipids today  BP/Renal: BP at goal, not on ACEi, microalbumin: update today  Vaccines: per PCP  Podiatry: no active issues, encouraged well-fitting footwear and daily inspection  Neuro: no sx of neuropathy  Ophtho: yearly eye exam recommended  Diet and exercise: discussed healthy eating and exercise recommendations, particularly reduction in dietary carbohydrates  Thyroid: check annual TFTs today  Contraception: discuss at next visit    We discussed the importance of good blood pressure and glycemic control to further reduce the risks of microvascular and macrovascular complications. We discussed how to recognize and treat hypoglycemia. She will notify me in between appointments for hypoglycemia or persistent hyperglycemia and has my contact information. I spent 60 minutes with the patient today and > 50% of the time was spent counseling the patient about diabetes management including medication options and dietary modification. Patient verbalized an understanding and will return to clinic in 3 months. Thank you for the opportunity to participate in this patient's care.     Claudia Wade MD  [facility] Endocr[facility]  [address] [facility]

## 2018-05-16 ENCOUNTER — TELEPHONE (OUTPATIENT)
Dept: DIABETES SERVICES | Age: 28
End: 2018-05-16

## 2018-05-16 LAB
ALBUMIN/CREAT UR: ABNORMAL MG/G CREAT (ref 0–30)
CHOLEST SERPL-MCNC: 231 MG/DL (ref 100–199)
CREAT UR-MCNC: 7.5 MG/DL
EST. AVERAGE GLUCOSE BLD GHB EST-MCNC: 286 MG/DL
HBA1C MFR BLD: 11.6 % (ref 4.8–5.6)
HDLC SERPL-MCNC: 71 MG/DL
LDLC SERPL CALC-MCNC: 144 MG/DL (ref 0–99)
MICROALBUMIN UR-MCNC: <3 UG/ML
T4 FREE SERPL-MCNC: 1.52 NG/DL (ref 0.82–1.77)
TRIGL SERPL-MCNC: 80 MG/DL (ref 0–149)
TSH SERPL DL<=0.005 MIU/L-ACNC: 1.6 UIU/ML (ref 0.45–4.5)
VLDLC SERPL CALC-MCNC: 16 MG/DL (ref 5–40)

## 2018-05-16 RX ORDER — INSULIN LISPRO 100 [IU]/ML
INJECTION, SOLUTION INTRAVENOUS; SUBCUTANEOUS
Qty: 1 PACKAGE | Refills: 3 | Status: SHIPPED | OUTPATIENT
Start: 2018-05-16 | End: 2018-05-17 | Stop reason: SDUPTHER

## 2018-05-16 NOTE — TELEPHONE ENCOUNTER
Spoke to her and informed her that I can't fill out paper work for a period of time that I wasn't her treating physician. Send paper work back.    Dr Carmen Ramirez

## 2018-05-16 NOTE — TELEPHONE ENCOUNTER
Pharmacy is asking if pt should take 6-12 units of humalog 3 times a day or should it be 3 times a day with meals?

## 2018-05-17 RX ORDER — INSULIN LISPRO 100 [IU]/ML
INJECTION, SOLUTION INTRAVENOUS; SUBCUTANEOUS
Qty: 1 PACKAGE | Refills: 3 | Status: SHIPPED | OUTPATIENT
Start: 2018-05-17 | End: 2018-07-11 | Stop reason: SDUPTHER

## 2018-05-17 NOTE — TELEPHONE ENCOUNTER
Returned call and spoke to pharmacist Zoe Mcbride. Zoe Mcbride would like to clarify directions for Humalog. Should this be taken 3 times daily with meals? Also, prescription needs to include max units per day. Please advise.

## 2018-05-17 NOTE — TELEPHONE ENCOUNTER
5/17/2018  9:32 AM          Pharmacist with [facility] need a call back at [phone] regarding Ms. Gabriele prescription.           Thanks

## 2018-05-23 ENCOUNTER — TELEPHONE (OUTPATIENT)
Dept: DIABETES SERVICES | Age: 28
End: 2018-05-23

## 2018-05-23 NOTE — TELEPHONE ENCOUNTER
2nd attempt to reach this patient (first attempt was on 05/16/18) attempting to schedule an insulin pump exploration visit per Dr. Ken Hinds orders.

## 2018-05-24 ENCOUNTER — TELEPHONE (OUTPATIENT)
Dept: ENDOCRINOLOGY | Age: 28
End: 2018-05-24

## 2018-05-24 NOTE — TELEPHONE ENCOUNTER
----- Message from Beulah Tineo sent at 5/24/2018  4:55 PM EDT -----  Regarding: /Telephone  Pt returning call from office. Best contact number is [phone].

## 2018-05-24 NOTE — TELEPHONE ENCOUNTER
----- Message from Nestor Campbell sent at 5/24/2018 11:32 AM EDT -----  Regarding: Dr Marimar Pollard from [facility] [phone], requesting a call back number from Dr Pichardo People regarding the out of work paper work, that they received regarding dates they need the  Certified out of work date range that the pt's is out of work they need more clarification. Ms Yasmin Garcia gave permission to leave a voice mail message on her secure line.

## 2018-05-24 NOTE — TELEPHONE ENCOUNTER
Called and spoke to Ana in regards to pt's leave of absence, and she stated that pt has been out of work since 5/2018 and has not yet returned, therefore, she needed clarity of when she was taken out her return to work date. Ana was made aware that the pt has only been to our office once which was on May 15, and according to Dr Katrin Black notes, she only will need to be out of work if she's sick (as needed basis) and for her doctor appts. Ana was then encouraged to reach out to pt's PCP for addition information.

## 2018-05-25 ENCOUNTER — TELEPHONE (OUTPATIENT)
Dept: ENDOCRINOLOGY | Age: 28
End: 2018-05-25

## 2018-05-25 NOTE — TELEPHONE ENCOUNTER
Spoke with pt to make her aware that she was only seeing once after her leave of absence therefore she will need to spoke to her PCP that was caring for her at that time.

## 2018-05-25 NOTE — TELEPHONE ENCOUNTER
----- Message from Eufemia Márquez sent at 5/25/2018 10:20 AM EDT -----  Regarding: Dr. Alford Alpers  Pt returning a miss call from the nurse. Requesting to speak with the nurse.  Best contact number [phone]

## 2018-05-29 ENCOUNTER — TELEPHONE (OUTPATIENT)
Dept: DIABETES SERVICES | Age: 28
End: 2018-05-29

## 2018-06-13 ENCOUNTER — TELEPHONE (OUTPATIENT)
Dept: ENDOCRINOLOGY | Age: 28
End: 2018-06-13

## 2018-06-13 NOTE — TELEPHONE ENCOUNTER
Pt is requesting a return to work letter. Pt stated she has been out of work due to her diabetes and was told by her manager that she will need a letter in order to return back.

## 2018-06-13 NOTE — TELEPHONE ENCOUNTER
She did not notify me of any problems with her blood sugars, thus I will not authorize any leave. In the future, she should let us know if she is having issues with her blood sugar that are significant enough to keep her out of work.

## 2018-06-13 NOTE — TELEPHONE ENCOUNTER
Yung Cueto! Ms. Karishma Page called today and she is asking If Dr. Enid Gomez can fax a return to work letter to [employer] in regards to her job. Fax number is [phone]. Ms. Karishma Page said to call her If you have any questions. Cliff matias

## 2018-07-18 ENCOUNTER — TELEPHONE (OUTPATIENT)
Dept: ENDOCRINOLOGY | Age: 28
End: 2018-07-18

## 2018-07-18 RX ORDER — INSULIN GLARGINE 100 [IU]/ML
INJECTION, SOLUTION SUBCUTANEOUS
Qty: 6 PEN | Refills: 1 | Status: SHIPPED | OUTPATIENT
Start: 2018-07-18 | End: 2018-09-18 | Stop reason: SDUPTHER

## 2018-07-18 NOTE — TELEPHONE ENCOUNTER
7/18/2018  3:07 PM      Ms. Roby Welsh stated that she called on Saturday to do a prior authorization and need the prior authorization done ASAP before her insurance runs out and if this isn't taken care of right away because she is completley out of insulin, she will be very disappointed and will be returning back to our office if this isn't taken care of.       Thanks

## 2018-07-18 NOTE — TELEPHONE ENCOUNTER
Ms. Darius Mcnamara says she was expecting a call yesterday from the nurse regarding a pre-authorization for [facility]) and she has not heard back yet and she says she really needs to pick it up today ASAP. Ms. Darius Mcnamara says that her insurance will cover for (LANTUS) but she would really prefer (Mahesh Parks). Ms. Darius Mcnamara would like for everything to be sent to [address] on [facility]. [place]. Ms. Darius Mcnamara would also like a call from the nurse to discuss this matter. Thanks Vinod Reid.

## 2018-07-18 NOTE — TELEPHONE ENCOUNTER
Returned pt's call. Patient x2 id verified. Pt stated that she has her old insurance only for 2 days as she changed the job. Pt would liked her Toujeo but it required PA from her insurance company and instructed patient that it might take few days till we hear from her insurance company. Pt stated that she can take lantus instead of toujeo since she used lantus before. Also, she would like that run through her old insurance. Pt stated that we can also disregard the PA for Toujeo. Lantus Rx sent to pharmacy on file per pt. Informed this message to pt as well. Verbalized understanding and no further questions at this time.

## 2018-07-18 NOTE — TELEPHONE ENCOUNTER
Requested Prescriptions     Pending Prescriptions Disp Refills    insulin glargine (LANTUS SOLOSTAR U-100 INSULIN) 100 unit/mL (3 mL) inpn 6 Pen      Sig: Inject 32 units subcutaneously once daily       Pt stated that she would like to be on Lantus cause she was taking lantus before. Pt was taking Toujeo and it requires PA from her insurance company.

## 2018-07-19 NOTE — TELEPHONE ENCOUNTER
Ms. Savannah Laura called to check on the status of her prescription request for [facility]). Patient would like a call when you have a moment. Thanks florencio

## 2018-09-27 ENCOUNTER — TELEPHONE (OUTPATIENT)
Dept: ENDOCRINOLOGY | Age: 28
End: 2018-09-27

## 2018-09-27 NOTE — TELEPHONE ENCOUNTER
----- Message from Brissa Garnica sent at 9/27/2018  1:00 PM EDT -----  Regarding: Dr. Jaun Chin with [facility], is calling for alternative for her insurance to cover. \"Levemir\" and \"Tresiba\" in place of Lantes. \"Novolog\" in place of Humalog. [phone].

## 2018-09-27 NOTE — TELEPHONE ENCOUNTER
Pharmacy stated that Humalog and Lantus are no longer covered under pt's insurance and has requested a new rx for Novolog.

## 2018-09-27 NOTE — TELEPHONE ENCOUNTER
Pt needs to schedule a f/u before I will authorize additional refills. If she schedules, I will prescribe a limited supply. I have only seen her once and am trying to establish correct doses. She no-showed to her f/u visit and has not rescheduled.

## 2018-09-27 NOTE — TELEPHONE ENCOUNTER
She needs to schedule a follow up first. I have only seen her once. She no showed her to last visit and has not rescheduled.

## 2018-09-28 ENCOUNTER — TELEPHONE (OUTPATIENT)
Dept: ENDOCRINOLOGY | Age: 28
End: 2018-09-28

## 2018-09-28 NOTE — TELEPHONE ENCOUNTER
9/28/2018  3:18 PM      Good Afternoon Dr. Tabatha Lerma,    Patient Willian Villaseñor called the office today a little before lunch upset and told Tabby that she wanted to speak to the . When I got on the phone I spoke asked how she was doing today and if I could help and she she started using profanity words. Ms. Naheed Ball then went on to say Dr. Tanisha Rose wanted her to make and appointment before given her any additional refills and she was upset about it. Ms. Naheed Ball wouldn't let me speak as I was trying to put together what was going on she told me she didn't need for me to say anything she just wanted me to listen (in an unappropriate manner.)      Ms. Naheed Ball then started to complain about how our practice was not helpful and neither was Jose Stalling and how she didn't have insulin because of an insurance change she had and it wasn't no longer covered by the new insurance. I proceeded to tell Ms. Naheed Ball that I could speak to Dr. Tabatha Lerma and see if we could give her a sample or see what medicine was covered under her new insurance so that she wouldn't be out of her insulin she then cut me off while I was speaking and stated that she didn't want anything from us at all  (then started using profanity over and over and over)  then told me to take her out of our system all together and that she was going to find another Endocrinologist and then disconnected the phone call.

## 2018-09-28 NOTE — TELEPHONE ENCOUNTER
9/28/2018  3:02 PM      Today 9/28/18 just before lunch Ms. Hipolito Brumfield called stating that she need a refill on her insulin and to update her new insurance I stated to Ms. Hipolito Brumfield that he have her new insurance and that an appointment is also need for refills at that moment she became upset about not having insulin I told Ms. Hipolito Brumfield that I can schedule her for October 12 @ 8:30 she declined that appointment. I was reading the message that Dr. Yaniv Raza sent me yesterday, before I could finish Ms. Hipolito Brumfield started to cuss at me and stating that she is never coming back to our office. I then told her that Dr. Yaniv Raza will give you enough insulin to last until your appointment  she cussed at me again and then hung up the phone a few moments later Ms. Hipolito Brumfield called back an asked to speak with the . I went and got Ignacia Palencia and explianed the matter to mason and Ignacia Palencia took the the call from there.       Thanks

## 2018-09-28 NOTE — TELEPHONE ENCOUNTER
Called and left a message on her voicemail. Explained to her that Dr Asuncion Martini did send prescriptions to her pharmacy on 9/19/2018, so she should be able to fill these. Also explained that Dr. Asuncion Martini was away at a conference for the last 2 days and I apologize for any miscommunication or misunderstanding she had with our office.

## 2018-10-01 RX ORDER — INSULIN GLARGINE 100 [IU]/ML
INJECTION, SOLUTION SUBCUTANEOUS
OUTPATIENT
Start: 2018-10-01

## 2018-10-01 RX ORDER — INSULIN ASPART 100 [IU]/ML
INJECTION, SOLUTION INTRAVENOUS; SUBCUTANEOUS
OUTPATIENT
Start: 2018-10-01

## 2018-10-02 NOTE — TELEPHONE ENCOUNTER
I did not say she had to be seen by 10/12, I just requested that she reschedule the f/u appointment that she no-showed for in order to obtain refills to last until her scheduled follow-up. I have only seen her once at her initial visit in May and due to her uncontrolled diabetes (A1c 11.6%) I recommend f/u visits every 3 months. If she chooses to see a different endocrinologist, that is fine. We just need the name of the practice to send over her records.

## 2018-10-02 NOTE — TELEPHONE ENCOUNTER
Pt stated that she spoke with someone last Friday regarding this and she made them aware that she needed to be seen before the 12th of Oct, so she will be in this Friday to get her records and will be seeing another doctor.

## 2018-10-03 ENCOUNTER — TELEPHONE (OUTPATIENT)
Dept: ENDOCRINOLOGY | Age: 28
End: 2018-10-03

## 2018-10-03 NOTE — TELEPHONE ENCOUNTER
Called and spoke to Ramya Barrios the Pharmacist at [facility] and he stated that the Lantus and Novolog are no longer coved under the pt's insurance. He then stated the pt's insurance is requesting [place] and Novolog as the alternative medication. Provider was made aware and she that as of last week, the pt decided to find another endo to treat her, but to call the pt to be for sure. I was unable to reach pt, but a voice message was left for a return phone call.

## 2018-10-03 NOTE — TELEPHONE ENCOUNTER
Yung Cueto! Garcia from [facility] one called in regard to a drug change request that was sent to our office on September 26, 2018. Garcia would like to know the status of that request. You can reach Garcia at [phone]. Thanks Hiwot Dennis.

## 2018-10-03 NOTE — TELEPHONE ENCOUNTER
Pt called and was asked whether not she'll continue seeing Dr Napoleon Torres as her endocrinologist. She stated that she will now be seeing Dr Benjamin Doe at [address] and has an appt at the end of the week. Cindy Delatorre at [facility] was made aware and he stated he will talk to her tomorrow and will make sure she has enough insulin to last her until her upcoming appt with her new doc.
